# Patient Record
Sex: FEMALE | Race: WHITE | Employment: OTHER | URBAN - METROPOLITAN AREA
[De-identification: names, ages, dates, MRNs, and addresses within clinical notes are randomized per-mention and may not be internally consistent; named-entity substitution may affect disease eponyms.]

---

## 2017-10-30 ENCOUNTER — OFFICE VISIT (OUTPATIENT)
Dept: OBGYN | Age: 72
End: 2017-10-30

## 2017-10-30 VITALS
DIASTOLIC BLOOD PRESSURE: 70 MMHG | BODY MASS INDEX: 32.21 KG/M2 | HEIGHT: 63 IN | WEIGHT: 181.8 LBS | SYSTOLIC BLOOD PRESSURE: 114 MMHG

## 2017-10-30 DIAGNOSIS — Z11.51 SCREENING FOR HPV (HUMAN PAPILLOMAVIRUS): ICD-10-CM

## 2017-10-30 DIAGNOSIS — Z78.0 POSTMENOPAUSAL: ICD-10-CM

## 2017-10-30 DIAGNOSIS — Z12.31 SCREENING MAMMOGRAM, ENCOUNTER FOR: ICD-10-CM

## 2017-10-30 DIAGNOSIS — Z01.419 WELL WOMAN EXAM WITH ROUTINE GYNECOLOGICAL EXAM: Primary | ICD-10-CM

## 2017-10-30 PROCEDURE — G0101 CA SCREEN;PELVIC/BREAST EXAM: HCPCS | Performed by: OBSTETRICS & GYNECOLOGY

## 2017-10-30 RX ORDER — BIMATOPROST 0.01 %
1 DROPS OPHTHALMIC (EYE) NIGHTLY
COMMUNITY
Start: 2017-09-06

## 2017-10-30 RX ORDER — SULFAMETHOXAZOLE AND TRIMETHOPRIM 800; 160 MG/1; MG/1
1 TABLET ORAL 2 TIMES DAILY
Status: ON HOLD | COMMUNITY
End: 2019-05-07

## 2017-10-30 RX ORDER — CEFUROXIME AXETIL 250 MG/1
TABLET ORAL
Status: ON HOLD | COMMUNITY
Start: 2017-09-26 | End: 2019-05-07

## 2017-10-30 NOTE — PROGRESS NOTES
History and Physical  [facility] [address]  P: [phone] / F: [phone]  Carlos Garcia  10/30/2017              71 y.o. Chief Complaint   Patient presents with    Annual Exam     last pap 10-18-16,wnl no complaints. declined student     New Patient     previous pt of Dr. Sandi Boles       /70   Ht 5' 3\" (1.6 m)   Wt 181 lb 12.8 oz (82.5 kg)   Breastfeeding? No   BMI 32.20 kg/m²   Alllergies:  Phenergan [promethazine hcl]               Primary Care Physician: Chelsea Hair MD    HPI : Carlos Garcia is a 71 y.o. female [id number] The patient was seen and examined. She has no chief complaint today and is here for her annual exam.  Her bowels are regular. There are no voiding complaints. She denies any bloating. She denies vaginal discharge and was counseled on STD's and the need for barrier contraception.        ________________________________________________________________________  Obstetric History       T4      L4     SAB0   TAB0   Ectopic0   Molar0   Multiple0   Live Births4       # Outcome Date GA Lbr Armando/2nd Weight Sex Delivery Anes PTL Lv   4 Term 80    F CS-Unspec   MATTHEW   3 Term 10/01/76    F CS-Unspec   MATTHEW   2 Term 76    M CS-Unspec   MATTHEW   1 Term 73    F CS-Unspec   MATTHEW        Past Medical History:   Diagnosis Date    Glaucoma     Meningitis                                                                    Past Surgical History:   Procedure Laterality Date    CHOLECYSTECTOMY      TONSILLECTOMY AND ADENOIDECTOMY       Family History   Problem Relation Age of Onset   [facility] COPD Mother     Coronary Art Dis Mother     COPD Father     Dementia Father     Other Father      series of pneumonia     Heart Defect Maternal Grandmother      enlarged heart     Hearing Loss Maternal Grandmother     Hearing Loss Maternal Grandfather     Heart Attack Paternal Grandmother  Coronary Art Dis Paternal Grandmother     Colon Cancer Paternal Grandfather      Social History     Social History    Marital status:      Spouse name: N/A    Number of children: N/A    Years of education: N/A     Occupational History    Not on file. Social History Main Topics    Smoking status: Never Smoker    Smokeless tobacco: Never Used    Alcohol use Yes      Comment: occ    Drug use: No    Sexual activity: Yes     Partners: Male     Other Topics Concern    Not on file     Social History Narrative    No narrative on file         MEDICATIONS:  Current Outpatient Prescriptions on File Prior to Visit   Medication Sig Dispense Refill    valACYclovir (VALTREX) 1 G tablet Take 1,000 mg by mouth 2 times daily. No current facility-administered medications on file prior to visit. ALLERGIES:  Allergies as of 10/30/2017 - Review Complete 10/30/2017   Allergen Reaction Noted    Phenergan [promethazine hcl]  12/06/2013           Gynecologic History:     No LMP recorded. Patient is postmenopausal.      ________________________________________________________________________  REVIEW OF SYSTEMS:       A minimum of an eleven point review of systems was completed. Review Of Systems (11 point):  Constitutional: No fever, chills or malaise;  No weight change or fatigue  Head and Eyes: No vision, Headache, Dizziness or trauma in last 12 months  ENT ROS: No hearing, Tinnitis, sinus or taste problems  Hematological and Lymphatic ROS: No Lymphoma, Von Willebrand's, Hemophillia or Bleeding History  Psych ROS: No Depression, Homicidal thoughts,suicidal thoughts, or anxiety  Breast ROS: No prior breast abnormalities or lumps  Respiratory ROS: No SOB, Pneumoniae,Cough, or Pulmonary Embolism History  Cardiovascular ROS: No Chest Pain with Exertion, Palpitations, Syncope, Edema, Arrhythmia  Gastrointestinal ROS: No Indigestion, Heartburn, Nausea, Vomiting, Diarrhea, Constipation,or Bowel Changes; No Bloody Stools or melena  Genito-Urinary ROS: No Dysuria, Hematuria or Nocturia. No Urinary Incontinence or Vaginal Discharge  Musculoskeletal ROS: No Arthralgia, Gout,Osteoporosis or Rheumatism  Neurological ROS: No CVA, Migraines, Epilepsy, Seizure Hx, or Limb Weakness  Dermatological ROS: No Rash, Itching, Hives, Mole Changes or Cancer                                                                                                                                                 PHYSICAL Exam:    Constitutional:  Vitals:    10/30/17 1306   BP: 114/70   Weight: 181 lb 12.8 oz (82.5 kg)   Height: 5' 3\" (1.6 m)       General Appearance: This  is a well Developed, well Nourished, well groomed female. Her BMI was reviewed. Nutritional decision making was discussed. Skin:  There was a Normal Inspection of the skin without rashes or lesions. There were no rashes. (Papular, Maculopapular, Hives, Pustular, Macular)     There were no lesions (Ulcers, Erythema, Abn. Appearing Nevi)      Lymphatic:  No Lymph Nodes were Palpable in the neck, axilla or groin. Neck and EENT:  The neck was supple. There were no masses   The thyroid was not enlarged and had no masses. Perrla, EOMI B/L, TMI B/L No Abnormalities. Throat inspected-No exudates or Masses, Nares Patent No Masses    Respiratory: The lungs were auscultated and found to be clear. There were no rales, rhonchi or wheezes. There was a good respiratory effort. Cardiovascular: The heart was in a regular rate and rhythm. No S3 or S4. There was no murmur appreciated. Location, grade, and radiation are not applicable. Extremities: The patients extremities were without calf tenderness, edema, or varicosities. There was full range of motion in all four extremities. Pulses in all four extremities were appreciated and are 2/4. Abdomen: The abdomen was soft and non-tender.  There were good bowel sounds in all quadrants and there was no guarding, rebound or rigidity. On evaluation there was no evidence of hepatosplenomegaly and there was no costal vertebral stephon tenderness bilaterally. No hernias were appreciated. Abdominal Scars:     Psych: The patient had a normal Orientation to: Time, Place, Person, and Situation  There is no Mood / Affect changes    Breast:  (Chest)  normal appearance, no masses or tenderness  Self breast exams were reviewed in detail. Literature was given. Pelvic Exam:  Vulva and vagina appear normal. Bimanual exam reveals normal uterus and adnexa. Rectal Exam:  Normal    Musculosk:  Normal Gait and station was noted. Digits were evaluated without abnormal findings. Range of motion, stability and strength were evaluated and found to be appropriate for the patients age. ASSESSMENT:      71 y.o. Annual  1. Well woman exam with routine gynecological exam  PAP SMEAR   2. Screening for HPV (human papillomavirus)  PAP SMEAR   3. Screening mammogram, encounter for  [facility] DIGITAL SCREEN W CAD BILATERAL   4. Postmenopausal  DEXA Bone Density Axial Skeleton                  Hereditary Breast, Ovarian, Colon and Uterine Cancer screening Done. Tobacco & Secondary smoke risks reviewed; instructed on cessation and avoidance      Counseling Completed:          PLAN:  Return in about 1 year (around 10/30/2018) for annual.  Repeat Annual every 1 year  Cervical Cytology Evaluation begins at 24years old. If Negative Cytology, Follow-up screening per current guidelines. Mammograms every 1 year. If 35 yo and last mammogram was negative. Calcium and Vitamin D dosing reviewed. Colonoscopy screening reviewed as well as onset for bone density testing. Birth control and barrier recommendations discussed. STD counseling and prevention reviewed. Gardisil counseling completed for all patients 7-33 yo. Routine health maintenance per patients PCP.     Orders Placed This Encounter   Procedures    [facility] DIGITAL SCREEN W

## 2017-11-06 ENCOUNTER — HOSPITAL ENCOUNTER (OUTPATIENT)
Dept: WOMENS IMAGING | Age: 72
Discharge: HOME | End: 2017-11-06
Payer: MEDICARE

## 2017-11-06 DIAGNOSIS — Z78.0 POSTMENOPAUSAL: ICD-10-CM

## 2017-11-06 DIAGNOSIS — Z12.31 SCREENING MAMMOGRAM, ENCOUNTER FOR: ICD-10-CM

## 2017-11-06 PROCEDURE — G0202 SCR MAMMO BI INCL CAD: HCPCS

## 2017-11-06 PROCEDURE — 77080 DXA BONE DENSITY AXIAL: CPT

## 2017-11-07 DIAGNOSIS — Z01.419 WELL WOMAN EXAM WITH ROUTINE GYNECOLOGICAL EXAM: ICD-10-CM

## 2017-11-07 DIAGNOSIS — Z11.51 SCREENING FOR HPV (HUMAN PAPILLOMAVIRUS): ICD-10-CM

## 2017-11-14 ENCOUNTER — TELEPHONE (OUTPATIENT)
Dept: OBGYN | Age: 72
End: 2017-11-14

## 2018-11-13 ENCOUNTER — OFFICE VISIT (OUTPATIENT)
Dept: OBGYN CLINIC | Age: 73
End: 2018-11-13
Payer: MEDICARE

## 2018-11-13 VITALS — WEIGHT: 196 LBS | DIASTOLIC BLOOD PRESSURE: 70 MMHG | BODY MASS INDEX: 34.72 KG/M2 | SYSTOLIC BLOOD PRESSURE: 126 MMHG

## 2018-11-13 DIAGNOSIS — Z01.419 WELL WOMAN EXAM: Primary | ICD-10-CM

## 2018-11-13 DIAGNOSIS — Z12.31 SCREENING MAMMOGRAM, ENCOUNTER FOR: ICD-10-CM

## 2018-11-13 PROCEDURE — G8484 FLU IMMUNIZE NO ADMIN: HCPCS | Performed by: OBSTETRICS & GYNECOLOGY

## 2018-11-13 PROCEDURE — G0101 CA SCREEN;PELVIC/BREAST EXAM: HCPCS | Performed by: OBSTETRICS & GYNECOLOGY

## 2018-11-13 ASSESSMENT — PATIENT HEALTH QUESTIONNAIRE - PHQ9
SUM OF ALL RESPONSES TO PHQ QUESTIONS 1-9: 0
SUM OF ALL RESPONSES TO PHQ QUESTIONS 1-9: 0
SUM OF ALL RESPONSES TO PHQ9 QUESTIONS 1 & 2: 0
2. FEELING DOWN, DEPRESSED OR HOPELESS: 0
1. LITTLE INTEREST OR PLEASURE IN DOING THINGS: 0

## 2018-11-13 ASSESSMENT — ENCOUNTER SYMPTOMS
VOMITING: 0
BLOOD IN STOOL: 0
ALLERGIC/IMMUNOLOGIC NEGATIVE: 1
NAUSEA: 0
DIARRHEA: 0
ABDOMINAL DISTENTION: 0
ABDOMINAL PAIN: 0
CONSTIPATION: 0
EYES NEGATIVE: 1
RECTAL PAIN: 0
ANAL BLEEDING: 0
RESPIRATORY NEGATIVE: 1

## 2018-11-19 ENCOUNTER — HOSPITAL ENCOUNTER (OUTPATIENT)
Dept: WOMENS IMAGING | Age: 73
Discharge: HOME | End: 2018-11-21
Payer: MEDICARE

## 2018-11-19 DIAGNOSIS — Z12.31 SCREENING MAMMOGRAM, ENCOUNTER FOR: ICD-10-CM

## 2018-11-19 PROCEDURE — 77067 SCR MAMMO BI INCL CAD: CPT

## 2019-03-12 ENCOUNTER — HOSPITAL ENCOUNTER (OUTPATIENT)
Dept: MRI IMAGING | Age: 74
Discharge: HOME | End: 2019-03-14
Payer: MEDICARE

## 2019-03-12 DIAGNOSIS — M17.11 OSTEOARTHRITIS OF RIGHT KNEE, UNSPECIFIED OSTEOARTHRITIS TYPE: ICD-10-CM

## 2019-03-12 PROCEDURE — 73721 MRI JNT OF LWR EXTRE W/O DYE: CPT

## 2019-04-25 ENCOUNTER — HOSPITAL ENCOUNTER (OUTPATIENT)
Dept: PREADMISSION TESTING | Age: 74
Discharge: HOME | End: 2019-04-29
Payer: MEDICARE

## 2019-04-25 VITALS
HEIGHT: 63 IN | BODY MASS INDEX: 34.55 KG/M2 | RESPIRATION RATE: 16 BRPM | DIASTOLIC BLOOD PRESSURE: 80 MMHG | TEMPERATURE: 97.6 F | HEART RATE: 73 BPM | OXYGEN SATURATION: 98 % | SYSTOLIC BLOOD PRESSURE: 165 MMHG | WEIGHT: 195 LBS

## 2019-04-25 DIAGNOSIS — L71.8: Chronic | ICD-10-CM

## 2019-04-25 DIAGNOSIS — L71.9 ROSACEA, ACNE: Chronic | ICD-10-CM

## 2019-04-25 DIAGNOSIS — H10.823: Chronic | ICD-10-CM

## 2019-04-25 PROBLEM — N39.0 UTI (URINARY TRACT INFECTION): Chronic | Status: ACTIVE | Noted: 2019-04-25

## 2019-04-25 PROBLEM — H40.9 GLAUCOMA OF BOTH EYES: Chronic | Status: ACTIVE | Noted: 2019-04-25

## 2019-04-25 PROBLEM — M17.11 RIGHT KNEE DJD: Status: ACTIVE | Noted: 2019-04-25

## 2019-04-25 LAB
ABO/RH: NORMAL
ALBUMIN SERPL-MCNC: 4.3 G/DL (ref 3.5–4.6)
ALP BLD-CCNC: 106 U/L (ref 40–130)
ALT SERPL-CCNC: 14 U/L (ref 0–33)
ANION GAP SERPL CALCULATED.3IONS-SCNC: 14 MEQ/L (ref 9–15)
ANTIBODY SCREEN: NORMAL
APTT: 24.9 SEC (ref 21.6–35.4)
AST SERPL-CCNC: 10 U/L (ref 0–35)
BACTERIA: NEGATIVE /HPF
BASOPHILS ABSOLUTE: 0 K/UL (ref 0–0.2)
BASOPHILS RELATIVE PERCENT: 0.6 %
BILIRUB SERPL-MCNC: 0.4 MG/DL (ref 0.2–0.7)
BILIRUBIN URINE: NEGATIVE
BLOOD, URINE: NEGATIVE
BUN BLDV-MCNC: 17 MG/DL (ref 8–23)
CALCIUM SERPL-MCNC: 9 MG/DL (ref 8.5–9.9)
CHLORIDE BLD-SCNC: 97 MEQ/L (ref 95–107)
CLARITY: CLEAR
CO2: 22 MEQ/L (ref 20–31)
COLOR: YELLOW
CREAT SERPL-MCNC: 0.69 MG/DL (ref 0.5–0.9)
EOSINOPHILS ABSOLUTE: 0.1 K/UL (ref 0–0.7)
EOSINOPHILS RELATIVE PERCENT: 2.1 %
EPITHELIAL CELLS, UA: ABNORMAL /HPF (ref 0–5)
GFR AFRICAN AMERICAN: >60
GFR NON-AFRICAN AMERICAN: >60
GLOBULIN: 2.5 G/DL (ref 2.3–3.5)
GLUCOSE BLD-MCNC: 95 MG/DL (ref 70–99)
GLUCOSE URINE: NEGATIVE MG/DL
HCT VFR BLD CALC: 41 % (ref 37–47)
HEMOGLOBIN: 13.9 G/DL (ref 12–16)
HYALINE CASTS: ABNORMAL /HPF (ref 0–5)
INR BLD: 1
KETONES, URINE: NEGATIVE MG/DL
LEUKOCYTE ESTERASE, URINE: ABNORMAL
LYMPHOCYTES ABSOLUTE: 2.3 K/UL (ref 1–4.8)
LYMPHOCYTES RELATIVE PERCENT: 38.7 %
MCH RBC QN AUTO: 31.7 PG (ref 27–31.3)
MCHC RBC AUTO-ENTMCNC: 33.9 % (ref 33–37)
MCV RBC AUTO: 93.5 FL (ref 82–100)
MONOCYTES ABSOLUTE: 0.7 K/UL (ref 0.2–0.8)
MONOCYTES RELATIVE PERCENT: 11 %
NEUTROPHILS ABSOLUTE: 2.9 K/UL (ref 1.4–6.5)
NEUTROPHILS RELATIVE PERCENT: 47.6 %
NITRITE, URINE: NEGATIVE
PDW BLD-RTO: 13.5 % (ref 11.5–14.5)
PH UA: 6 (ref 5–9)
PLATELET # BLD: 253 K/UL (ref 130–400)
POTASSIUM SERPL-SCNC: 3.8 MEQ/L (ref 3.4–4.9)
PROTEIN UA: NEGATIVE MG/DL
PROTHROMBIN TIME: 9.8 SEC (ref 9–11.5)
RBC # BLD: 4.39 M/UL (ref 4.2–5.4)
RBC UA: ABNORMAL /HPF (ref 0–5)
SODIUM BLD-SCNC: 133 MEQ/L (ref 135–144)
SPECIFIC GRAVITY UA: 1.02 (ref 1–1.03)
TOTAL PROTEIN: 6.8 G/DL (ref 6.3–8)
URINE REFLEX TO CULTURE: YES
UROBILINOGEN, URINE: 0.2 E.U./DL
WBC # BLD: 6 K/UL (ref 4.8–10.8)
WBC UA: ABNORMAL /HPF (ref 0–5)

## 2019-04-25 PROCEDURE — 81001 URINALYSIS AUTO W/SCOPE: CPT

## 2019-04-25 PROCEDURE — 85610 PROTHROMBIN TIME: CPT

## 2019-04-25 PROCEDURE — 87077 CULTURE AEROBIC IDENTIFY: CPT

## 2019-04-25 PROCEDURE — 86850 RBC ANTIBODY SCREEN: CPT

## 2019-04-25 PROCEDURE — 80053 COMPREHEN METABOLIC PANEL: CPT

## 2019-04-25 PROCEDURE — 86901 BLOOD TYPING SEROLOGIC RH(D): CPT

## 2019-04-25 PROCEDURE — 86900 BLOOD TYPING SEROLOGIC ABO: CPT

## 2019-04-25 PROCEDURE — 87186 SC STD MICRODIL/AGAR DIL: CPT

## 2019-04-25 PROCEDURE — 85730 THROMBOPLASTIN TIME PARTIAL: CPT

## 2019-04-25 PROCEDURE — 85025 COMPLETE CBC W/AUTO DIFF WBC: CPT

## 2019-04-25 PROCEDURE — 87086 URINE CULTURE/COLONY COUNT: CPT

## 2019-04-25 RX ORDER — ASCORBIC ACID 500 MG
1000 TABLET ORAL DAILY
COMMUNITY

## 2019-04-25 RX ORDER — CHOLECALCIFEROL (VITAMIN D3) 1250 MCG
CAPSULE ORAL WEEKLY
COMMUNITY
End: 2019-11-14 | Stop reason: CLARIF

## 2019-04-25 RX ORDER — SODIUM CHLORIDE 0.9 % (FLUSH) 0.9 %
10 SYRINGE (ML) INJECTION PRN
Status: CANCELLED | OUTPATIENT
Start: 2019-05-07

## 2019-04-25 RX ORDER — OYSTER SHELL CALCIUM WITH VITAMIN D 500; 200 MG/1; [IU]/1
1 TABLET, FILM COATED ORAL DAILY
COMMUNITY

## 2019-04-25 RX ORDER — SODIUM CHLORIDE 0.9 % (FLUSH) 0.9 %
10 SYRINGE (ML) INJECTION EVERY 12 HOURS SCHEDULED
Status: CANCELLED | OUTPATIENT
Start: 2019-05-07

## 2019-04-25 RX ORDER — SODIUM CHLORIDE, SODIUM LACTATE, POTASSIUM CHLORIDE, CALCIUM CHLORIDE 600; 310; 30; 20 MG/100ML; MG/100ML; MG/100ML; MG/100ML
INJECTION, SOLUTION INTRAVENOUS CONTINUOUS
Status: CANCELLED | OUTPATIENT
Start: 2019-05-07

## 2019-04-25 RX ORDER — AMOXICILLIN 500 MG
CAPSULE ORAL DAILY
COMMUNITY
End: 2019-11-14 | Stop reason: CLARIF

## 2019-04-25 RX ORDER — LIDOCAINE HYDROCHLORIDE 10 MG/ML
1 INJECTION, SOLUTION EPIDURAL; INFILTRATION; INTRACAUDAL; PERINEURAL
Status: CANCELLED | OUTPATIENT
Start: 2019-05-07 | End: 2019-05-07

## 2019-04-25 RX ORDER — PREDNISONE 10 MG/1
10 TABLET ORAL DAILY
Status: ON HOLD | COMMUNITY
End: 2019-05-07

## 2019-04-25 NOTE — PROGRESS NOTES
EKG done by PCP Dr. Dom Grimaldo & paper copy on chart. Patient with recent UTI & started on Bactrim 4/22/2019 / urine with reflex done at PAT appt / repeat urine with reflex to be done on day or OR.

## 2019-04-27 LAB
ORGANISM: ABNORMAL
URINE CULTURE, ROUTINE: ABNORMAL
URINE CULTURE, ROUTINE: ABNORMAL

## 2019-05-07 ENCOUNTER — APPOINTMENT (OUTPATIENT)
Dept: GENERAL RADIOLOGY | Age: 74
DRG: 470 | End: 2019-05-07
Attending: ORTHOPAEDIC SURGERY
Payer: MEDICARE

## 2019-05-07 ENCOUNTER — ANESTHESIA EVENT (OUTPATIENT)
Dept: OPERATING ROOM | Age: 74
DRG: 470 | End: 2019-05-07
Payer: MEDICARE

## 2019-05-07 ENCOUNTER — ANESTHESIA (OUTPATIENT)
Dept: OPERATING ROOM | Age: 74
DRG: 470 | End: 2019-05-07
Payer: MEDICARE

## 2019-05-07 ENCOUNTER — HOSPITAL ENCOUNTER (INPATIENT)
Age: 74
LOS: 2 days | Discharge: HOME HEALTH | DRG: 470 | End: 2019-05-09
Attending: ORTHOPAEDIC SURGERY | Admitting: ORTHOPAEDIC SURGERY
Payer: MEDICARE

## 2019-05-07 VITALS — SYSTOLIC BLOOD PRESSURE: 131 MMHG | DIASTOLIC BLOOD PRESSURE: 68 MMHG | OXYGEN SATURATION: 98 %

## 2019-05-07 DIAGNOSIS — Z96.651 H/O TOTAL KNEE REPLACEMENT, RIGHT: Primary | ICD-10-CM

## 2019-05-07 LAB
BACTERIA: NEGATIVE /HPF
BILIRUBIN URINE: NEGATIVE
BLOOD, URINE: NEGATIVE
CLARITY: CLEAR
COLOR: YELLOW
EPITHELIAL CELLS, UA: ABNORMAL /HPF (ref 0–5)
GLUCOSE URINE: NEGATIVE MG/DL
HYALINE CASTS: ABNORMAL /HPF (ref 0–5)
KETONES, URINE: NEGATIVE MG/DL
LEUKOCYTE ESTERASE, URINE: ABNORMAL
NITRITE, URINE: NEGATIVE
PH UA: 5 (ref 5–9)
PROTEIN UA: NEGATIVE MG/DL
RBC UA: ABNORMAL /HPF (ref 0–5)
SPECIFIC GRAVITY UA: 1.02 (ref 1–1.03)
UROBILINOGEN, URINE: 0.2 E.U./DL
WBC UA: ABNORMAL /HPF (ref 0–5)

## 2019-05-07 PROCEDURE — 2580000003 HC RX 258: Performed by: ORTHOPAEDIC SURGERY

## 2019-05-07 PROCEDURE — 3700000001 HC ADD 15 MINUTES (ANESTHESIA): Performed by: ORTHOPAEDIC SURGERY

## 2019-05-07 PROCEDURE — 6360000002 HC RX W HCPCS: Performed by: NURSE PRACTITIONER

## 2019-05-07 PROCEDURE — 6360000002 HC RX W HCPCS: Performed by: ANESTHESIOLOGY

## 2019-05-07 PROCEDURE — 6360000002 HC RX W HCPCS: Performed by: ORTHOPAEDIC SURGERY

## 2019-05-07 PROCEDURE — C1713 ANCHOR/SCREW BN/BN,TIS/BN: HCPCS | Performed by: ORTHOPAEDIC SURGERY

## 2019-05-07 PROCEDURE — 2709999900 HC NON-CHARGEABLE SUPPLY: Performed by: ORTHOPAEDIC SURGERY

## 2019-05-07 PROCEDURE — 2580000003 HC RX 258: Performed by: NURSE PRACTITIONER

## 2019-05-07 PROCEDURE — 0SRC0J9 REPLACEMENT OF RIGHT KNEE JOINT WITH SYNTHETIC SUBSTITUTE, CEMENTED, OPEN APPROACH: ICD-10-PCS | Performed by: ORTHOPAEDIC SURGERY

## 2019-05-07 PROCEDURE — 73560 X-RAY EXAM OF KNEE 1 OR 2: CPT

## 2019-05-07 PROCEDURE — 87077 CULTURE AEROBIC IDENTIFY: CPT

## 2019-05-07 PROCEDURE — 97166 OT EVAL MOD COMPLEX 45 MIN: CPT

## 2019-05-07 PROCEDURE — 7100000000 HC PACU RECOVERY - FIRST 15 MIN: Performed by: ORTHOPAEDIC SURGERY

## 2019-05-07 PROCEDURE — 97535 SELF CARE MNGMENT TRAINING: CPT

## 2019-05-07 PROCEDURE — 6370000000 HC RX 637 (ALT 250 FOR IP): Performed by: NURSE PRACTITIONER

## 2019-05-07 PROCEDURE — 6370000000 HC RX 637 (ALT 250 FOR IP): Performed by: ORTHOPAEDIC SURGERY

## 2019-05-07 PROCEDURE — 64447 NJX AA&/STRD FEMORAL NRV IMG: CPT | Performed by: ANESTHESIOLOGY

## 2019-05-07 PROCEDURE — 81001 URINALYSIS AUTO W/SCOPE: CPT

## 2019-05-07 PROCEDURE — 3600000014 HC SURGERY LEVEL 4 ADDTL 15MIN: Performed by: ORTHOPAEDIC SURGERY

## 2019-05-07 PROCEDURE — 2580000003 HC RX 258: Performed by: ANESTHESIOLOGY

## 2019-05-07 PROCEDURE — 87186 SC STD MICRODIL/AGAR DIL: CPT

## 2019-05-07 PROCEDURE — 2500000003 HC RX 250 WO HCPCS: Performed by: NURSE PRACTITIONER

## 2019-05-07 PROCEDURE — 3700000000 HC ANESTHESIA ATTENDED CARE: Performed by: ORTHOPAEDIC SURGERY

## 2019-05-07 PROCEDURE — 1210000000 HC MED SURG R&B

## 2019-05-07 PROCEDURE — 97162 PT EVAL MOD COMPLEX 30 MIN: CPT

## 2019-05-07 PROCEDURE — C1776 JOINT DEVICE (IMPLANTABLE): HCPCS | Performed by: ORTHOPAEDIC SURGERY

## 2019-05-07 PROCEDURE — 6360000002 HC RX W HCPCS: Performed by: NURSE ANESTHETIST, CERTIFIED REGISTERED

## 2019-05-07 PROCEDURE — 3600000004 HC SURGERY LEVEL 4 BASE: Performed by: ORTHOPAEDIC SURGERY

## 2019-05-07 PROCEDURE — 2500000003 HC RX 250 WO HCPCS: Performed by: NURSE ANESTHETIST, CERTIFIED REGISTERED

## 2019-05-07 PROCEDURE — 7100000001 HC PACU RECOVERY - ADDTL 15 MIN: Performed by: ORTHOPAEDIC SURGERY

## 2019-05-07 PROCEDURE — 87086 URINE CULTURE/COLONY COUNT: CPT

## 2019-05-07 PROCEDURE — 2720000010 HC SURG SUPPLY STERILE: Performed by: ORTHOPAEDIC SURGERY

## 2019-05-07 DEVICE — SYSTEM TOT KNEE CEM FEM TIB COMP STD TIB INSRT STD PAT: Type: IMPLANTABLE DEVICE | Site: KNEE | Status: FUNCTIONAL

## 2019-05-07 DEVICE — COMPONENT ARTC SURF PS 6-9 CD 12 MM RT TIB KNEE POLYETH: Type: IMPLANTABLE DEVICE | Site: KNEE | Status: FUNCTIONAL

## 2019-05-07 DEVICE — IMPL KNEE PERSONA POLY PATELLA 26MM: Type: IMPLANTABLE DEVICE | Site: KNEE | Status: FUNCTIONAL

## 2019-05-07 DEVICE — PSN TIB STM 5 DEG SZ D R: Type: IMPLANTABLE DEVICE | Site: KNEE | Status: FUNCTIONAL

## 2019-05-07 DEVICE — JIG SURG RT KNEE AREF PT SPEC DISP PERSONA: Type: IMPLANTABLE DEVICE | Site: KNEE | Status: FUNCTIONAL

## 2019-05-07 DEVICE — CEMENT BNE 40GM HI VISC RADPQ FOR REV SURG: Type: IMPLANTABLE DEVICE | Site: KNEE | Status: FUNCTIONAL

## 2019-05-07 DEVICE — COMPONENT FEM SZ 6 STD R KNEE CO CHROM CEM POST STBL COR: Type: IMPLANTABLE DEVICE | Site: KNEE | Status: FUNCTIONAL

## 2019-05-07 RX ORDER — LIDOCAINE HYDROCHLORIDE 20 MG/ML
INJECTION, SOLUTION INFILTRATION; PERINEURAL PRN
Status: DISCONTINUED | OUTPATIENT
Start: 2019-05-07 | End: 2019-05-07 | Stop reason: SDUPTHER

## 2019-05-07 RX ORDER — SODIUM CHLORIDE 0.9 % (FLUSH) 0.9 %
10 SYRINGE (ML) INJECTION PRN
Status: DISCONTINUED | OUTPATIENT
Start: 2019-05-07 | End: 2019-05-09 | Stop reason: HOSPADM

## 2019-05-07 RX ORDER — HYDROCODONE BITARTRATE AND ACETAMINOPHEN 5; 325 MG/1; MG/1
1 TABLET ORAL PRN
Status: DISCONTINUED | OUTPATIENT
Start: 2019-05-07 | End: 2019-05-07 | Stop reason: HOSPADM

## 2019-05-07 RX ORDER — SODIUM CHLORIDE 9 MG/ML
INJECTION, SOLUTION INTRAVENOUS CONTINUOUS
Status: DISCONTINUED | OUTPATIENT
Start: 2019-05-07 | End: 2019-05-09 | Stop reason: HOSPADM

## 2019-05-07 RX ORDER — KETOROLAC TROMETHAMINE 15 MG/ML
15 INJECTION, SOLUTION INTRAMUSCULAR; INTRAVENOUS EVERY 6 HOURS
Status: COMPLETED | OUTPATIENT
Start: 2019-05-07 | End: 2019-05-07

## 2019-05-07 RX ORDER — MORPHINE SULFATE 2 MG/ML
2 INJECTION, SOLUTION INTRAMUSCULAR; INTRAVENOUS
Status: DISCONTINUED | OUTPATIENT
Start: 2019-05-07 | End: 2019-05-09 | Stop reason: HOSPADM

## 2019-05-07 RX ORDER — SULFAMETHOXAZOLE AND TRIMETHOPRIM 800; 160 MG/1; MG/1
1 TABLET ORAL
Status: ON HOLD | COMMUNITY
End: 2019-05-09 | Stop reason: HOSPADM

## 2019-05-07 RX ORDER — SODIUM CHLORIDE, SODIUM LACTATE, POTASSIUM CHLORIDE, CALCIUM CHLORIDE 600; 310; 30; 20 MG/100ML; MG/100ML; MG/100ML; MG/100ML
INJECTION, SOLUTION INTRAVENOUS CONTINUOUS
Status: DISCONTINUED | OUTPATIENT
Start: 2019-05-07 | End: 2019-05-07

## 2019-05-07 RX ORDER — ROPIVACAINE HYDROCHLORIDE 5 MG/ML
INJECTION, SOLUTION EPIDURAL; INFILTRATION; PERINEURAL PRN
Status: DISCONTINUED | OUTPATIENT
Start: 2019-05-07 | End: 2019-05-07 | Stop reason: SDUPTHER

## 2019-05-07 RX ORDER — OXYCODONE HYDROCHLORIDE 5 MG/1
5 TABLET ORAL EVERY 4 HOURS PRN
Status: DISCONTINUED | OUTPATIENT
Start: 2019-05-07 | End: 2019-05-09 | Stop reason: HOSPADM

## 2019-05-07 RX ORDER — DIPHENHYDRAMINE HYDROCHLORIDE 50 MG/ML
12.5 INJECTION INTRAMUSCULAR; INTRAVENOUS
Status: DISCONTINUED | OUTPATIENT
Start: 2019-05-07 | End: 2019-05-07 | Stop reason: HOSPADM

## 2019-05-07 RX ORDER — DIAZEPAM 2 MG/1
2 TABLET ORAL EVERY 6 HOURS PRN
Status: DISCONTINUED | OUTPATIENT
Start: 2019-05-07 | End: 2019-05-08

## 2019-05-07 RX ORDER — OXYCODONE HCL 10 MG/1
10 TABLET, FILM COATED, EXTENDED RELEASE ORAL ONCE
Status: COMPLETED | OUTPATIENT
Start: 2019-05-07 | End: 2019-05-07

## 2019-05-07 RX ORDER — HYDROCODONE BITARTRATE AND ACETAMINOPHEN 5; 325 MG/1; MG/1
2 TABLET ORAL PRN
Status: DISCONTINUED | OUTPATIENT
Start: 2019-05-07 | End: 2019-05-07 | Stop reason: HOSPADM

## 2019-05-07 RX ORDER — SODIUM CHLORIDE 0.9 % (FLUSH) 0.9 %
10 SYRINGE (ML) INJECTION EVERY 12 HOURS SCHEDULED
Status: DISCONTINUED | OUTPATIENT
Start: 2019-05-07 | End: 2019-05-07 | Stop reason: HOSPADM

## 2019-05-07 RX ORDER — SODIUM CHLORIDE 0.9 % (FLUSH) 0.9 %
10 SYRINGE (ML) INJECTION EVERY 12 HOURS SCHEDULED
Status: DISCONTINUED | OUTPATIENT
Start: 2019-05-07 | End: 2019-05-09 | Stop reason: HOSPADM

## 2019-05-07 RX ORDER — PROPOFOL 10 MG/ML
INJECTION, EMULSION INTRAVENOUS CONTINUOUS PRN
Status: DISCONTINUED | OUTPATIENT
Start: 2019-05-07 | End: 2019-05-07 | Stop reason: SDUPTHER

## 2019-05-07 RX ORDER — ACETAMINOPHEN 325 MG/1
650 TABLET ORAL EVERY 4 HOURS PRN
Status: DISCONTINUED | OUTPATIENT
Start: 2019-05-07 | End: 2019-05-09 | Stop reason: HOSPADM

## 2019-05-07 RX ORDER — ASPIRIN 81 MG/1
81 TABLET ORAL 2 TIMES DAILY
Status: DISCONTINUED | OUTPATIENT
Start: 2019-05-08 | End: 2019-05-09 | Stop reason: HOSPADM

## 2019-05-07 RX ORDER — MIDAZOLAM HYDROCHLORIDE 1 MG/ML
INJECTION INTRAMUSCULAR; INTRAVENOUS PRN
Status: DISCONTINUED | OUTPATIENT
Start: 2019-05-07 | End: 2019-05-07 | Stop reason: SDUPTHER

## 2019-05-07 RX ORDER — LIDOCAINE HYDROCHLORIDE 10 MG/ML
1 INJECTION, SOLUTION EPIDURAL; INFILTRATION; INTRACAUDAL; PERINEURAL
Status: COMPLETED | OUTPATIENT
Start: 2019-05-07 | End: 2019-05-07

## 2019-05-07 RX ORDER — SENNA AND DOCUSATE SODIUM 50; 8.6 MG/1; MG/1
1 TABLET, FILM COATED ORAL 2 TIMES DAILY
Status: DISCONTINUED | OUTPATIENT
Start: 2019-05-07 | End: 2019-05-09 | Stop reason: HOSPADM

## 2019-05-07 RX ORDER — ONDANSETRON 2 MG/ML
4 INJECTION INTRAMUSCULAR; INTRAVENOUS
Status: DISCONTINUED | OUTPATIENT
Start: 2019-05-07 | End: 2019-05-07 | Stop reason: HOSPADM

## 2019-05-07 RX ORDER — CELECOXIB 200 MG/1
400 CAPSULE ORAL ONCE
Status: COMPLETED | OUTPATIENT
Start: 2019-05-07 | End: 2019-05-07

## 2019-05-07 RX ORDER — MORPHINE SULFATE 4 MG/ML
4 INJECTION, SOLUTION INTRAMUSCULAR; INTRAVENOUS
Status: DISCONTINUED | OUTPATIENT
Start: 2019-05-07 | End: 2019-05-09 | Stop reason: HOSPADM

## 2019-05-07 RX ORDER — METOCLOPRAMIDE HYDROCHLORIDE 5 MG/ML
10 INJECTION INTRAMUSCULAR; INTRAVENOUS
Status: DISCONTINUED | OUTPATIENT
Start: 2019-05-07 | End: 2019-05-07 | Stop reason: HOSPADM

## 2019-05-07 RX ORDER — FENTANYL CITRATE 50 UG/ML
50 INJECTION, SOLUTION INTRAMUSCULAR; INTRAVENOUS EVERY 10 MIN PRN
Status: DISCONTINUED | OUTPATIENT
Start: 2019-05-07 | End: 2019-05-07 | Stop reason: HOSPADM

## 2019-05-07 RX ORDER — FENTANYL CITRATE 50 UG/ML
INJECTION, SOLUTION INTRAMUSCULAR; INTRAVENOUS PRN
Status: DISCONTINUED | OUTPATIENT
Start: 2019-05-07 | End: 2019-05-07 | Stop reason: SDUPTHER

## 2019-05-07 RX ORDER — ACETAMINOPHEN 500 MG
1000 TABLET ORAL ONCE
Status: COMPLETED | OUTPATIENT
Start: 2019-05-07 | End: 2019-05-07

## 2019-05-07 RX ORDER — MAGNESIUM HYDROXIDE 1200 MG/15ML
LIQUID ORAL CONTINUOUS PRN
Status: COMPLETED | OUTPATIENT
Start: 2019-05-07 | End: 2019-05-07

## 2019-05-07 RX ORDER — SODIUM CHLORIDE 0.9 % (FLUSH) 0.9 %
10 SYRINGE (ML) INJECTION PRN
Status: DISCONTINUED | OUTPATIENT
Start: 2019-05-07 | End: 2019-05-07 | Stop reason: HOSPADM

## 2019-05-07 RX ORDER — ONDANSETRON 2 MG/ML
4 INJECTION INTRAMUSCULAR; INTRAVENOUS EVERY 6 HOURS PRN
Status: DISCONTINUED | OUTPATIENT
Start: 2019-05-07 | End: 2019-05-09 | Stop reason: HOSPADM

## 2019-05-07 RX ORDER — LIDOCAINE HYDROCHLORIDE 10 MG/ML
1 INJECTION, SOLUTION EPIDURAL; INFILTRATION; INTRACAUDAL; PERINEURAL
Status: DISCONTINUED | OUTPATIENT
Start: 2019-05-07 | End: 2019-05-07 | Stop reason: HOSPADM

## 2019-05-07 RX ORDER — MEPERIDINE HYDROCHLORIDE 25 MG/ML
12.5 INJECTION INTRAMUSCULAR; INTRAVENOUS; SUBCUTANEOUS EVERY 5 MIN PRN
Status: DISCONTINUED | OUTPATIENT
Start: 2019-05-07 | End: 2019-05-07 | Stop reason: HOSPADM

## 2019-05-07 RX ADMIN — KETOROLAC TROMETHAMINE 15 MG: 15 INJECTION, SOLUTION INTRAMUSCULAR; INTRAVENOUS at 14:17

## 2019-05-07 RX ADMIN — TRANEXAMIC ACID 1000 MG: 1 INJECTION, SOLUTION INTRAVENOUS at 10:37

## 2019-05-07 RX ADMIN — PHENYLEPHRINE HYDROCHLORIDE 100 MCG: 10 INJECTION INTRAVENOUS at 10:35

## 2019-05-07 RX ADMIN — SODIUM CHLORIDE: 9 INJECTION, SOLUTION INTRAVENOUS at 14:17

## 2019-05-07 RX ADMIN — ONDANSETRON 4 MG: 2 INJECTION INTRAMUSCULAR; INTRAVENOUS at 14:17

## 2019-05-07 RX ADMIN — PHENYLEPHRINE HYDROCHLORIDE 100 MCG: 10 INJECTION INTRAVENOUS at 11:27

## 2019-05-07 RX ADMIN — MIDAZOLAM HYDROCHLORIDE 2 MG: 1 INJECTION, SOLUTION INTRAMUSCULAR; INTRAVENOUS at 10:05

## 2019-05-07 RX ADMIN — PHENYLEPHRINE HYDROCHLORIDE 100 MCG: 10 INJECTION INTRAVENOUS at 11:29

## 2019-05-07 RX ADMIN — ONDANSETRON 4 MG: 2 INJECTION INTRAMUSCULAR; INTRAVENOUS at 20:28

## 2019-05-07 RX ADMIN — PHENYLEPHRINE HYDROCHLORIDE 100 MCG: 10 INJECTION INTRAVENOUS at 10:37

## 2019-05-07 RX ADMIN — SENNOSIDES,DOCUSATE SODIUM 1 TABLET: 50; 8.6 TABLET, FILM COATED ORAL at 21:23

## 2019-05-07 RX ADMIN — DIAZEPAM 2 MG: 2 TABLET ORAL at 18:14

## 2019-05-07 RX ADMIN — PHENYLEPHRINE HYDROCHLORIDE 100 MCG: 10 INJECTION INTRAVENOUS at 11:09

## 2019-05-07 RX ADMIN — ACETAMINOPHEN 1000 MG: 500 TABLET ORAL at 08:58

## 2019-05-07 RX ADMIN — CELECOXIB 400 MG: 200 CAPSULE ORAL at 08:58

## 2019-05-07 RX ADMIN — PHENYLEPHRINE HYDROCHLORIDE 100 MCG: 10 INJECTION INTRAVENOUS at 11:02

## 2019-05-07 RX ADMIN — KETOROLAC TROMETHAMINE 15 MG: 15 INJECTION, SOLUTION INTRAMUSCULAR; INTRAVENOUS at 20:29

## 2019-05-07 RX ADMIN — FENTANYL CITRATE 100 MCG: 50 INJECTION, SOLUTION INTRAMUSCULAR; INTRAVENOUS at 10:05

## 2019-05-07 RX ADMIN — PROPOFOL 100 MCG/KG/MIN: 10 INJECTION, EMULSION INTRAVENOUS at 10:32

## 2019-05-07 RX ADMIN — PHENYLEPHRINE HYDROCHLORIDE 100 MCG: 10 INJECTION INTRAVENOUS at 10:54

## 2019-05-07 RX ADMIN — PHENYLEPHRINE HYDROCHLORIDE 100 MCG: 10 INJECTION INTRAVENOUS at 10:52

## 2019-05-07 RX ADMIN — LIDOCAINE HYDROCHLORIDE 100 MG: 20 INJECTION, SOLUTION INFILTRATION; PERINEURAL at 10:32

## 2019-05-07 RX ADMIN — OXYCODONE HYDROCHLORIDE 5 MG: 5 TABLET ORAL at 19:49

## 2019-05-07 RX ADMIN — TRANEXAMIC ACID 1000 MG: 1 INJECTION, SOLUTION INTRAVENOUS at 12:37

## 2019-05-07 RX ADMIN — PHENYLEPHRINE HYDROCHLORIDE 100 MCG: 10 INJECTION INTRAVENOUS at 10:43

## 2019-05-07 RX ADMIN — SODIUM CHLORIDE, POTASSIUM CHLORIDE, SODIUM LACTATE AND CALCIUM CHLORIDE: 600; 310; 30; 20 INJECTION, SOLUTION INTRAVENOUS at 11:29

## 2019-05-07 RX ADMIN — FAMOTIDINE 20 MG: 10 INJECTION, SOLUTION INTRAVENOUS at 14:17

## 2019-05-07 RX ADMIN — OXYCODONE HYDROCHLORIDE 5 MG: 5 TABLET ORAL at 14:53

## 2019-05-07 RX ADMIN — Medication 2 G: at 10:18

## 2019-05-07 RX ADMIN — ROPIVACAINE HYDROCHLORIDE 30 ML: 5 INJECTION, SOLUTION EPIDURAL; INFILTRATION; PERINEURAL at 10:10

## 2019-05-07 RX ADMIN — PHENYLEPHRINE HYDROCHLORIDE 100 MCG: 10 INJECTION INTRAVENOUS at 10:44

## 2019-05-07 RX ADMIN — SODIUM CHLORIDE, POTASSIUM CHLORIDE, SODIUM LACTATE AND CALCIUM CHLORIDE: 600; 310; 30; 20 INJECTION, SOLUTION INTRAVENOUS at 08:58

## 2019-05-07 RX ADMIN — OXYCODONE HYDROCHLORIDE 5 MG: 5 TABLET ORAL at 23:38

## 2019-05-07 RX ADMIN — CEFAZOLIN SODIUM 2 G: 1 INJECTION, SOLUTION INTRAVENOUS at 18:14

## 2019-05-07 RX ADMIN — LIDOCAINE HYDROCHLORIDE 0.1 ML: 10 INJECTION, SOLUTION EPIDURAL; INFILTRATION; INTRACAUDAL; PERINEURAL at 08:57

## 2019-05-07 RX ADMIN — SODIUM CHLORIDE, POTASSIUM CHLORIDE, SODIUM LACTATE AND CALCIUM CHLORIDE: 600; 310; 30; 20 INJECTION, SOLUTION INTRAVENOUS at 09:58

## 2019-05-07 RX ADMIN — OXYCODONE HYDROCHLORIDE 10 MG: 10 TABLET, FILM COATED, EXTENDED RELEASE ORAL at 08:58

## 2019-05-07 RX ADMIN — Medication 10 ML: at 20:30

## 2019-05-07 ASSESSMENT — PULMONARY FUNCTION TESTS
PIF_VALUE: 1
PIF_VALUE: 0
PIF_VALUE: 1
PIF_VALUE: 0
PIF_VALUE: 1
PIF_VALUE: 0
PIF_VALUE: 1
PIF_VALUE: 0
PIF_VALUE: 1
PIF_VALUE: 1
PIF_VALUE: 0
PIF_VALUE: 1
PIF_VALUE: 1
PIF_VALUE: 0
PIF_VALUE: 1
PIF_VALUE: 0
PIF_VALUE: 1
PIF_VALUE: 0
PIF_VALUE: 1

## 2019-05-07 ASSESSMENT — PAIN SCALES - GENERAL
PAINLEVEL_OUTOF10: 0
PAINLEVEL_OUTOF10: 4
PAINLEVEL_OUTOF10: 3
PAINLEVEL_OUTOF10: 7
PAINLEVEL_OUTOF10: 5
PAINLEVEL_OUTOF10: 0
PAINLEVEL_OUTOF10: 0
PAINLEVEL_OUTOF10: 6
PAINLEVEL_OUTOF10: 2
PAINLEVEL_OUTOF10: 3

## 2019-05-07 ASSESSMENT — PAIN DESCRIPTION - LOCATION
LOCATION: KNEE
LOCATION: KNEE;LEG

## 2019-05-07 ASSESSMENT — PAIN DESCRIPTION - ORIENTATION
ORIENTATION: RIGHT
ORIENTATION: RIGHT

## 2019-05-07 ASSESSMENT — PAIN DESCRIPTION - PAIN TYPE
TYPE: SURGICAL PAIN
TYPE: SURGICAL PAIN

## 2019-05-07 ASSESSMENT — ENCOUNTER SYMPTOMS
APNEA: 0
EYE DISCHARGE: 0
CHEST TIGHTNESS: 0
ABDOMINAL DISTENTION: 0
EYE ITCHING: 0
ABDOMINAL PAIN: 0
BACK PAIN: 0

## 2019-05-07 NOTE — FLOWSHEET NOTE
Perfect served dr Grant Fuentes to inform home medication list has been verified, per dr Grant Fuentes, no medications need reordered at this time.

## 2019-05-07 NOTE — ANESTHESIA PROCEDURE NOTES
Spinal Block    Patient location during procedure: pre-op  Reason for block: primary anesthetic  Staffing  Anesthesiologist: Patrick King MD  Preanesthetic Checklist  Completed: patient identified, site marked, surgical consent, pre-op evaluation, timeout performed, IV checked, risks and benefits discussed, monitors and equipment checked, anesthesia consent given, oxygen available and patient being monitored  Spinal Block  Patient position: sitting  Prep: Betadine  Patient monitoring: cardiac monitor, continuous pulse ox and frequent blood pressure checks  Approach: midline  Location: L4/L5  Provider prep: mask and sterile gloves  Local infiltration: lidocaine  Agent: bupivacaine  Dose: 2  Dose: 2  Needle  Needle type: Pencan   Needle gauge: 24 G  Assessment  Events: cerebrospinal fluid  Swirl obtained: Yes  CSF: clear  Attempts: 1  Hemodynamics: stable

## 2019-05-07 NOTE — CARE COORDINATION
Met with patient regarding discharge planning. She states she lives with her spouse. She has a walker and a shower chair. She had prehab with [facility]. Her plan is to go home with [facility] at discharge. She denies any other needs. Electronically signed by Ting Guy RN on 5/7/19 at 3:48 PM    Medicare IMM reviewed with patient. She verbalized understanding and signature obtained.   Electronically signed by Ting Guy RN on 5/7/19 at 3:51 PM

## 2019-05-07 NOTE — CONSULTS
Consults     Ortho team consulted me for medical management for her medical conditions, pt denies any needs except for nausea and vomiting. S/p total right knee replacement tolerated the procedure well.     Past Medical History:   Diagnosis Date    Arthritis     both knees    Glaucoma 2017    Meningitis     PONV (postoperative nausea and vomiting)      Past Surgical History:   Procedure Laterality Date    CARPAL TUNNEL RELEASE Left      SECTION  1972    CHOLECYSTECTOMY      COLONOSCOPY      ENDOSCOPY, COLON, DIAGNOSTIC      EYE SURGERY      phaco with IOL OU    EYE SURGERY      Lasik OU    TONSILLECTOMY AND ADENOIDECTOMY      as child    WRIST GANGLION EXCISION Bilateral     right  / left      Social History     Tobacco History     Smoking Status  Never Smoker    Smokeless Tobacco Use  Never Used          Alcohol History     Alcohol Use Status  Yes Comment  occ          Drug Use     Drug Use Status  No          Sexual Activity     Sexually Active  Yes Partners  Male              Family History   Problem Relation Age of Onset    COPD Mother     Coronary Art Dis Mother     COPD Father     Dementia Father     Other Father         series of pneumonia     No Known Problems Sister     Heart Defect Maternal Grandmother         enlarged heart     Hearing Loss Maternal Grandmother     Hearing Loss Maternal Grandfather     Heart Attack Paternal Grandmother     Coronary Art Dis Paternal Grandmother     Colon Cancer Paternal Grandfather     No Known Problems Other     Other Daughter         migraines    Arthritis Daughter         s/p LTHR    No Known Problems Son     Breast Cancer Neg Hx     Cancer Neg Hx     Diabetes Neg Hx     Eclampsia Neg Hx     Hypertension Neg Hx     Ovarian Cancer Neg Hx      Labor Neg Hx     Spont Abortions Neg Hx     Stroke Neg Hx      Social History     Tobacco History     Smoking Status  Never Smoker    Smokeless deviation present. No thyromegaly present. Cardiovascular: Normal rate and regular rhythm. No murmur heard. Pulmonary/Chest: No stridor. No respiratory distress. Abdominal: She exhibits no distension. There is no tenderness. Musculoskeletal: She exhibits no edema. Neurological: She is alert and oriented to person, place, and time. No cranial nerve deficit. Coordination normal.   Skin: No erythema. No pallor.      Lab Results   Component Value Date    WBC 6.0 04/25/2019    HGB 13.9 04/25/2019    HCT 41.0 04/25/2019    MCV 93.5 04/25/2019     04/25/2019     Lab Results   Component Value Date     04/25/2019    K 3.8 04/25/2019    CL 97 04/25/2019    CO2 22 04/25/2019    BUN 17 04/25/2019    CREATININE 0.69 04/25/2019    GLUCOSE 95 04/25/2019    CALCIUM 9.0 04/25/2019      1) encounter for post surgical care  Pain is controlled  2) HTN hydralazine prn  Resume lopressor  3)DVT ppx

## 2019-05-07 NOTE — FLOWSHEET NOTE
Pt arrived to room 226 awake and alert x 4, denies pain, oriented to room and call system, call light in reach, bed in lowest position, will continue to monitor.

## 2019-05-07 NOTE — ANESTHESIA POSTPROCEDURE EVALUATION
Department of Anesthesiology  Postprocedure Note    Patient: Cristal Vázquez  MRN: [id number]  YOB: 1945  Date of evaluation: 5/7/2019  Time:  12:04 PM     Procedure Summary     Date:  05/07/19 Room / Location:  [address] OR    Anesthesia Start:  1018 Anesthesia Stop:      Procedure:  RIGHT TOTAL KNEE ARTHROPLASTY, SUPINE, MARY PSI (Right ) Diagnosis:  (RIGHT KNEE DEGENERATIVE JOINT DISEASE)    Surgeon:  Victor M Harmon MD Responsible Provider:  Jax Gil MD    Anesthesia Type:  spinal ASA Status:  3          Anesthesia Type: spinal    Cristela Phase I:      Cristela Phase II:      Last vitals: Reviewed and per EMR flowsheets.        Anesthesia Post Evaluation    Patient location during evaluation: PACU  Patient participation: complete - patient participated  Level of consciousness: awake and alert  Pain score: 0  Airway patency: patent  Nausea & Vomiting: no nausea and no vomiting  Complications: no  Cardiovascular status: hemodynamically stable  Respiratory status: spontaneous ventilation, room air and acceptable  Hydration status: stable

## 2019-05-07 NOTE — PROGRESS NOTES
Physical Therapy Med Surg Initial Assessment  Facility/Department: [facility]  Room: N226/N226-01       NAME: Dorothy Payne  : 1945 (26 y.o.)  MRN: [id number]  CODE STATUS: Full Code    Date of Service: 2019    Patient Diagnosis(es): H/O total knee replacement, right [Z96.651]   No chief complaint on file.     Patient Active Problem List    Diagnosis Date Noted    H/O total knee replacement, right 2019    UTI (urinary tract infection) 2019    Right knee DJD 2019    Glaucoma of both eyes 2019    Conjunctivitis of both eyes due to rosacea 2019    Rosacea, acne 2019    Aseptic meningitis 2013    Hypogammaglobulinemia ([address] 2013        Past Medical History:   Diagnosis Date    Arthritis     both knees    Glaucoma 2017    Meningitis     PONV (postoperative nausea and vomiting)      Past Surgical History:   Procedure Laterality Date    CARPAL TUNNEL RELEASE Left    [facility]    CHOLECYSTECTOMY      COLONOSCOPY      ENDOSCOPY, COLON, DIAGNOSTIC      EYE SURGERY      phaco with IOL OU    EYE SURGERY      Lasik OU    TONSILLECTOMY AND ADENOIDECTOMY      as child    WRIST GANGLION EXCISION Bilateral     right  / left        Chart Reviewed: Yes  Patient assessed for rehabilitation services?: Yes  Family / Caregiver Present: No(family steps out for eval)  General Comment  Comments: Pt awake in bed, agreeable to PT eval    Restrictions:  Restrictions/Precautions: Fall Risk, Weight Bearing  Lower Extremity Weight Bearing Restrictions  Right Lower Extremity Weight Bearing: Weight Bearing As Tolerated  Position Activity Restriction  Other position/activity restrictions: knee immobilizer until (+) SLR     SUBJECTIVE:    Pre Treatment Pain Screening  Pain at present: 4  Intervention List: Patient able to continue with treatment    Post Treatment Pain Screening:   Pain Screening  Patient Currently in Pain: Yes  Pain Assessment  Pain Level: 4    Prior Level of Function:  Social/Functional History  Lives With: Spouse  Type of Home: House  Home Layout: Multi-level, Bed/Bath upstairs(5 steps to main level with half bath, 8 steps up to bedroom with 1 HR)  Home Access: Stairs to enter without rails  Entrance Stairs - Number of Steps: 1  Bathroom Shower/Tub: Walk-in shower  Bathroom Equipment: Shower chair  Home Equipment: Rolling walker, Cane  ADL Assistance: Independent  Homemaking Assistance: Independent  Ambulation Assistance: Independent(no AD)  Transfer Assistance: Independent  Active : Yes  Occupation: Retired    OBJECTIVE:   Vision/Hearing:  Vision: Within Functional Limits  Hearing: Within functional limits    Cognition:  Overall Orientation Status: Within Functional Limits  Follows Commands: Within Functional Limits         ROM:  RLE AROM: WFL  RLE General AROM: knee NT  LLE AROM : WFL    Strength:  Strength RLE  Strength RLE: WFL  Strength LLE  Comment: observed >/= 3/5    Neuro:  Balance  Sitting - Static: Good  Sitting - Dynamic: Good;-  Standing - Static: Fair;-  Standing - Dynamic: Fair;-        Sensation  Overall Sensation Status: Impaired  Additional Comments: Residual numbness RLE    Bed mobility  Supine to Sit: Minimal assistance(Incidental assist with RLE)  Sit to Supine: Minimal assistance(Incidental assist with RLE)    Transfers  Sit to Stand: Contact guard assistance  Stand to sit: Contact guard assistance  Bed to Chair: Contact guard assistance  Comment: cues for safety and sequencing, compensatory strategies fo rknee immobilizer  Training from various surfaces including bed, chair, commode    Ambulation  Ambulation?: Yes  Ambulation 1  Surface: level tile  Device: Rolling Walker  Other Apparatus: Knee Immobilizer  Assistance: Contact guard assistance  Quality of Gait: step-to pattern, antalgic gait  Distance: 5 ft x2    Activity Tolerance  Activity Tolerance: pt was motivated and completed eval but did have episode of emesis after    Exercises  Quad Sets: x10  Gluteal Sets: x10(pt reports she cannot do too many glute sets because it caused sciatica in the past)  Ankle Pumps: x10     ASSESSMENT:   Body structures, Functions, Activity limitations: Decreased functional mobility ; Decreased strength;Decreased endurance;Decreased balance;Decreased ROM; Increased Pain  Decision Making: Medium Complexity  History: medium  Exam: medium  Clinical Presentation: medium    Prognosis: Good  Patient Education: PT POC, HEP, safety awareness    DISCHARGE RECOMMENDATIONS:  Discharge Recommendations: Continue to assess pending progress, Patient would benefit from continued therapy after discharge    Assessment: Pt demonstrates the above deficits and decline in functional mobility s/p R TKA placing them at increased risk for falls. Pt would benefit from physical therapy to address above deficits and allow for safe return home at highest level of function, decrease risk for falls, and improve QOL.     REQUIRES PT FOLLOW UP: Yes      PLAN OF CARE:  Plan  Times per week: 5-7  Times per day: Twice a day  Current Treatment Recommendations: Strengthening, Neuromuscular Re-education, Functional Mobility Training, Home Exercise Program, Equipment Evaluation, Education, & procurement, Transfer Training, ROM, Gait Training, Safety Education & Training, Modalities, Balance Training, Endurance Training, Stair training, Pain Management, Patient/Caregiver Education & Training  Safety Devices  Type of devices: Bed alarm in place, Call light within reach, Nurse notified    Goals:  Patient goals : agreeable to PT POC  Short term goals  Short term goal 1: indep with HEP  Long term goals  Long term goal 1: indep with bed mobility  Long term goal 2: pt to be supervision with transfers  Long term goal 3: pt to ambulate >50 ft supervision FWW  Long term goal 4: pt to navigate >8 steps with SBA    [facility] (6 CLICK) Andrew Hickman

## 2019-05-07 NOTE — ANESTHESIA PRE PROCEDURE
Department of Anesthesiology  Preprocedure Note       Name:  Ignacio Tao   Age:  69 y.o.  :  1945                                          MRN:  [id number]         Date:  2019      Surgeon: Margaret Barry):  Sarah Casillas MD    Procedure: RIGHT TOTAL KNEE ARTHROPLASTY, SUPINE, MARY PSI (Right )    Medications prior to admission:   Prior to Admission medications    Medication Sig Start Date End Date Taking? Authorizing Provider   metoprolol tartrate (LOPRESSOR) 25 MG tablet Take 12.5 mg by mouth daily   Yes Historical Provider, MD   aspirin 81 MG tablet Take 81 mg by mouth daily   Yes Historical Provider, MD   Omega-3 Fatty Acids (FISH OIL) 1200 MG CAPS Take by mouth daily   Yes Historical Provider, MD   calcium-vitamin D (OSCAL-500) 500-200 MG-UNIT per tablet Take 1 tablet by mouth daily   Yes Historical Provider, MD   vitamin C (ASCORBIC ACID) 500 MG tablet Take 1,000 mg by mouth daily   Yes Historical Provider, MD   Cholecalciferol (VITAMIN D3) 04375 units CAPS Take by mouth once a week   Yes Historical Provider, MD   LUMIGAN 0.01 % SOLN ophthalmic drops Place 1 drop into both eyes nightly  17  Yes Historical Provider, MD   sulfamethoxazole-trimethoprim (BACTRIM DS;SEPTRA DS) 800-160 MG per tablet Take 1 tablet by mouth 2 times daily   Yes Historical Provider, MD   AZO-CRANBERRY PO Take 2 tablets by mouth daily    Yes Historical Provider, MD   Probiotic Product (PROBIOTIC PO) Take by mouth   Yes Historical Provider, MD   valACYclovir (VALTREX) 1 G tablet Take 1,000 mg by mouth 2 times daily.    Yes Historical Provider, MD       Current medications:    Current Facility-Administered Medications   Medication Dose Route Frequency Provider Last Rate Last Dose    tranexamic acid (CYKLOKAPRON) 1,000 mg in sodium chloride 0.9 % 50 mL IVPB  1,000 mg Intravenous On Call to [address], APRN - CNP        tranexamic acid (CYKLOKAPRON) 1,000 mg in sodium chloride 0.9 % 100 mL IVPB  1,000 mg Intravenous On Call to [address] [facility] [address], APRN - CNP        lactated ringers infusion   Intravenous Continuous Anabel Snell APRN -  mL/hr at 05/07/19 0858      sodium chloride flush 0.9 % injection 10 mL  10 mL Intravenous 2 times per day Anabel Snell, APRN - CNP        sodium chloride flush 0.9 % injection 10 mL  10 mL Intravenous PRN Anabel Snell APRN - CNP        ceFAZolin (ANCEF) 2 g in dextrose 5 % 100 mL IVPB  2 g Intravenous Once Anabel Snell APRN - CNP        fentaNYL (SUBLIMAZE) injection 50 mcg  50 mcg Intravenous Q10 Min PRN Papo Montelongo MD        HYDROmorphone (DILAUDID) injection 0.5 mg  0.5 mg Intravenous Q10 Min PRN Papo Montelongo MD        HYDROcodone-acetaminophen [facility]) 5-325 MG per tablet 1 tablet  1 tablet Oral PRN Papo Montelongo MD        Or    HYDROcodone-acetaminophen [facility]) 5-325 MG per tablet 2 tablet  2 tablet Oral PRN Papo Montelongo MD        diphenhydrAMINE (BENADRYL) injection 12.5 mg  12.5 mg Intravenous Once PRN Papo Montelongo MD        ondansetron [facility]) injection 4 mg  4 mg Intravenous Once PRN Papo Montelongo MD        metoclopramide [facility]) injection 10 mg  10 mg Intravenous Once PRN Papo Montelongo MD        meperidine (DEMEROL) injection 12.5 mg  12.5 mg Intravenous Q5 Min PRN Papo Montelongo MD        lactated ringers infusion   Intravenous Continuous Papo Montelongo MD        sodium chloride flush 0.9 % injection 10 mL  10 mL Intravenous 2 times per day Papo Montelongo MD        sodium chloride flush 0.9 % injection 10 mL  10 mL Intravenous PRN Papo Montelongo MD        lidocaine PF 1 % injection 1 mL  1 mL Intradermal Once PRN Papo Montelongo MD           Allergies:     Allergies   Allergen Reactions    Phenergan [Promethazine Hcl]      Causes RLS       Problem List:    Patient Active Problem List   Diagnosis Code    Aseptic meningitis G03.0    Hypogammaglobulinemia (HCC) D80.1    UTI (urinary tract infection) N39.0    Right knee DJD M17.11    Glaucoma of both eyes H40.9    Conjunctivitis of both eyes due to rosacea L71.9, H10.823    Rosacea, acne L71.9       Past Medical History:        Diagnosis Date    Arthritis     both knees    Glaucoma 2017    Meningitis     PONV (postoperative nausea and vomiting)        Past Surgical History:        Procedure Laterality Date    CARPAL TUNNEL RELEASE Left 2000s [facility]    CHOLECYSTECTOMY  2011    COLONOSCOPY      ENDOSCOPY, COLON, DIAGNOSTIC      EYE SURGERY  2000s    phaco with IOL OU    EYE SURGERY      Lasik OU    TONSILLECTOMY AND ADENOIDECTOMY      as child    WRIST GANGLION EXCISION Bilateral     right 2005 / left 1970       Social History:    Social History     Tobacco Use    Smoking status: Never Smoker    Smokeless tobacco: Never Used   Substance Use Topics    Alcohol use: Yes     Comment: occ                                Counseling given: Not Answered      Vital Signs (Current):   Vitals:    05/07/19 0813   BP: 135/75   Pulse: 79   Resp: 16   Temp: 98.3 °F (36.8 °C)   TempSrc: Temporal   SpO2: 96%   Weight: 195 lb (88.5 kg)   Height: 5' 3\" (1.6 m)                                              BP Readings from Last 3 Encounters:   05/07/19 135/75   04/25/19 (!) 165/80   11/13/18 126/70       NPO Status:                                                                                 BMI:   Wt Readings from Last 3 Encounters:   05/07/19 195 lb (88.5 kg)   04/25/19 195 lb (88.5 kg)   11/13/18 196 lb (88.9 kg)     Body mass index is 34.54 kg/m².     CBC:   Lab Results   Component Value Date    WBC 6.0 04/25/2019    RBC 4.39 04/25/2019    HGB 13.9 04/25/2019    HCT 41.0 04/25/2019    MCV 93.5 04/25/2019    RDW 13.5 04/25/2019     04/25/2019       CMP:   Lab Results   Component Value Date     04/25/2019    K 3.8 04/25/2019 CL 97 04/25/2019    CO2 22 04/25/2019    BUN 17 04/25/2019    CREATININE 0.69 04/25/2019    GFRAA >60.0 04/25/2019    LABGLOM >60.0 04/25/2019    GLUCOSE 95 04/25/2019    PROT 6.8 04/25/2019    CALCIUM 9.0 04/25/2019    BILITOT 0.4 04/25/2019    ALKPHOS 106 04/25/2019    AST 10 04/25/2019    ALT 14 04/25/2019       POC Tests: No results for input(s): POCGLU, POCNA, POCK, POCCL, POCBUN, POCHEMO, POCHCT in the last 72 hours. Coags:   Lab Results   Component Value Date    PROTIME 9.8 04/25/2019    INR 1.0 04/25/2019    APTT 24.9 04/25/2019       HCG (If Applicable): No results found for: PREGTESTUR, PREGSERUM, HCG, HCGQUANT     ABGs: No results found for: PHART, PO2ART, DGQ2TDU, SRR3GBW, BEART, H1TOBJTR     Type & Screen (If Applicable):  No results found for: LABABO, [address]    Anesthesia Evaluation  Patient summary reviewed and Nursing notes reviewed   history of anesthetic complications: PONV. Airway: Mallampati: II  TM distance: >3 FB   Neck ROM: full  Mouth opening: > = 3 FB Dental: normal exam         Pulmonary:Negative Pulmonary ROS and normal exam                               Cardiovascular:    (+) hypertension:,       ECG reviewed               Beta Blocker:  Dose within 24 Hrs         Neuro/Psych:   Negative Neuro/Psych ROS              GI/Hepatic/Renal: Neg GI/Hepatic/Renal ROS  (+) morbid obesity          Endo/Other: Negative Endo/Other ROS             Pt had PAT visit. Abdominal:           Vascular: negative vascular ROS. Anesthesia Plan      spinal     ASA 3           MIPS: Prophylactic antiemetics administered. Anesthetic plan and risks discussed with patient. Plan discussed with CRNA.     Attending anesthesiologist reviewed and agrees with Pre Eval content              Magali Guerin MD   5/7/2019

## 2019-05-07 NOTE — PROGRESS NOTES
[facility] OCCUPATIONAL THERAPY EVALUATION - ACUTE     Date: 2019  Patient Name: Que Rivas        MRN: [id number]  Account: [de-identified]   : 1945  (69 y.o.)  Room: [place] [address]    Chart Review:  Diagnosis:  There were no encounter diagnoses. Past Medical History:   Diagnosis Date    Arthritis     both knees    Glaucoma 2017    Meningitis     PONV (postoperative nausea and vomiting)      Past Surgical History:   Procedure Laterality Date    CARPAL TUNNEL RELEASE Left s     [facility]    CHOLECYSTECTOMY      COLONOSCOPY      ENDOSCOPY, COLON, DIAGNOSTIC      EYE SURGERY  2000s    phaco with IOL OU    EYE SURGERY      Lasik OU    TONSILLECTOMY AND ADENOIDECTOMY      as child    WRIST GANGLION EXCISION Bilateral     right  / left        Restrictions  Restrictions/Precautions: Fall Risk, Weight Bearing  Lower Extremity Weight Bearing Restrictions  Right Lower Extremity Weight Bearing: Weight Bearing As Tolerated  Position Activity Restriction  Other position/activity restrictions: knee immobilizer until (+) SLR     Safety Devices: Safety Devices  Safety Devices in place: Yes  Type of devices:  All fall risk precautions in place    Subjective  Pre Treatment Pain Screening  Pain at present: 4  Scale Used: Numeric Score  Intervention List: Patient able to continue with treatment, Patient declined any intervention    Pain Reassessment:   Pain Assessment  Patient Currently in Pain: Yes       Orientation  Orientation  Overall Orientation Status: Within Functional Limits    Prior Level of Function:  Social/Functional History  Lives With: Spouse  Type of Home: House  Home Layout: Multi-level, Bed/Bath upstairs(5 steps to main level with half bath, 8 steps up to bedroom with 1 HR)  Home Access: Stairs to enter without rails  Entrance Stairs - Number of Steps: 1  Bathroom Shower/Tub: Walk-in shower  Bathroom Equipment: Shower chair  Home Equipment: Rolling for all mobility       Functional Mobility:  Functional Mobility  Functional - Mobility Device: Rolling Walker  Activity: Other  Assist Level: Contact guard assistance  Functional Mobility Comments: To [facility] and [facility] only  Transfers  Sit to stand: Contact guard assistance  Stand to sit: Contact guard assistance    Bed Mobility  Bed mobility  Supine to Sit: Minimal assistance(Incidental assist with RLE)  Sit to Supine: Minimal assistance(Incidental assist with RLE)    Seated and Standing Balance:  Balance  Sitting Balance: Modified independent   Standing Balance: Contact guard assistance    Functional Endurance:  Activity Tolerance  Activity Tolerance: Patient Tolerated treatment well     Pt. engaged in static standing to improve balance and strength for ADLs. Multiple sit-stand transfers to-from [facility] and [facility] to promote increased safety awareness and transfer technique. Pt. demo's G understanding and recall of techniques with each transfer. D/C Recommendations:    Equipment Recommendations:  Continue to assess    OT Follow Up:  OT D/C RECOMMENDATIONS  REQUIRES OT FOLLOW UP: Yes       Assessment/Discharge Disposition:  Assessment: Pt. is a 69year old woman from home with spouse who [facility] to [facility] with the above deficits which impact her ability to perform ADLs and IADLs. Pt. would benefit from skilled OT to maximize independence and ADL safety.   Performance deficits / Impairments: Decreased functional mobility , Decreased ADL status, Decreased endurance, Decreased balance, Decreased high-level IADLs  Prognosis: Good  Discharge Recommendations: Continue to assess pending progress  History: Pt's medical history is moderately complex  Exam: Pt. has 5 performance deficits  Assistance / Modification: Pt. requires min A throughout    Six Click Score   How much help for putting on and taking off regular lower body clothing?: A Lot  How much help for Bathing?: A Little  How much help for Toileting?: A Little  How much help for putting on and taking off regular upper body clothing?: None  How much help for taking care of personal grooming?: None  How much help for eating meals?: None  AM-PAC Inpatient Daily Activity Raw Score: 20  AM-PAC Inpatient ADL T-Scale Score : 42.03  ADL Inpatient CMS 0-100% Score: 38.32    Plan:  Plan  Times per week: 1-3x  Plan weeks: Length of acute stay  Current Treatment Recommendations: Balance Training, Functional Mobility Training, Endurance Training, Pain Management, Safety Education & Training, Equipment Evaluation, Education, & procurement, Patient/Caregiver Education & Training, Self-Care / ADL, Home Management Training    Goals:   Patient will:    - Improve functional endurance to tolerate/complete 60 mins of ADL's  - Be Mod I in UB ADLs   - Be Mod I in LB ADLs  - Be Mod I in ADL transfers without LOB  - Be Mod I in toileting tasks  - Access appropriate D/C site with as few architectural barriers as possible. - Sequence self-care tasks with no verbal cues for safety    Patient Goal: Patient goals : \"I want to get better and move around\"     Discussed and agreed upon: Yes Comments:     Therapy Time:   OT Individual Minutes  Time In: 1603  Time Out: 1630  Minutes: 27  8 minutes ADL    Electronically signed by:     DENISHA Dia  5/7/2019, 4:36 PM

## 2019-05-08 LAB
ANION GAP SERPL CALCULATED.3IONS-SCNC: 13 MEQ/L (ref 9–15)
BUN BLDV-MCNC: 12 MG/DL (ref 8–23)
CALCIUM SERPL-MCNC: 8.3 MG/DL (ref 8.5–9.9)
CHLORIDE BLD-SCNC: 103 MEQ/L (ref 95–107)
CO2: 24 MEQ/L (ref 20–31)
CREAT SERPL-MCNC: 0.56 MG/DL (ref 0.5–0.9)
GFR AFRICAN AMERICAN: >60
GFR NON-AFRICAN AMERICAN: >60
GLUCOSE BLD-MCNC: 120 MG/DL (ref 70–99)
HCT VFR BLD CALC: 34.8 % (ref 37–47)
HEMOGLOBIN: 11.9 G/DL (ref 12–16)
MCH RBC QN AUTO: 31.8 PG (ref 27–31.3)
MCHC RBC AUTO-ENTMCNC: 34.2 % (ref 33–37)
MCV RBC AUTO: 93 FL (ref 82–100)
PDW BLD-RTO: 12.8 % (ref 11.5–14.5)
PLATELET # BLD: 221 K/UL (ref 130–400)
POTASSIUM REFLEX MAGNESIUM: 4.7 MEQ/L (ref 3.4–4.9)
RBC # BLD: 3.75 M/UL (ref 4.2–5.4)
SODIUM BLD-SCNC: 140 MEQ/L (ref 135–144)
WBC # BLD: 6.8 K/UL (ref 4.8–10.8)

## 2019-05-08 PROCEDURE — 6360000002 HC RX W HCPCS: Performed by: ORTHOPAEDIC SURGERY

## 2019-05-08 PROCEDURE — 6370000000 HC RX 637 (ALT 250 FOR IP): Performed by: ORTHOPAEDIC SURGERY

## 2019-05-08 PROCEDURE — 80048 BASIC METABOLIC PNL TOTAL CA: CPT

## 2019-05-08 PROCEDURE — 85027 COMPLETE CBC AUTOMATED: CPT

## 2019-05-08 PROCEDURE — 2580000003 HC RX 258: Performed by: ORTHOPAEDIC SURGERY

## 2019-05-08 PROCEDURE — 97535 SELF CARE MNGMENT TRAINING: CPT

## 2019-05-08 PROCEDURE — 1210000000 HC MED SURG R&B

## 2019-05-08 PROCEDURE — 97116 GAIT TRAINING THERAPY: CPT

## 2019-05-08 PROCEDURE — 36415 COLL VENOUS BLD VENIPUNCTURE: CPT

## 2019-05-08 PROCEDURE — 6370000000 HC RX 637 (ALT 250 FOR IP): Performed by: NURSE PRACTITIONER

## 2019-05-08 RX ORDER — CALCIUM CARBONATE 500(1250)
1 TABLET ORAL DAILY
Status: DISCONTINUED | OUTPATIENT
Start: 2019-05-08 | End: 2019-05-09 | Stop reason: HOSPADM

## 2019-05-08 RX ORDER — SULFAMETHOXAZOLE AND TRIMETHOPRIM 800; 160 MG/1; MG/1
1 TABLET ORAL
Status: DISCONTINUED | OUTPATIENT
Start: 2019-05-08 | End: 2019-05-09

## 2019-05-08 RX ORDER — DIAZEPAM 5 MG/1
5 TABLET ORAL EVERY 6 HOURS PRN
Status: DISCONTINUED | OUTPATIENT
Start: 2019-05-08 | End: 2019-05-09 | Stop reason: HOSPADM

## 2019-05-08 RX ORDER — TRAMADOL HYDROCHLORIDE 50 MG/1
50 TABLET ORAL EVERY 6 HOURS PRN
Status: DISCONTINUED | OUTPATIENT
Start: 2019-05-08 | End: 2019-05-09 | Stop reason: HOSPADM

## 2019-05-08 RX ORDER — ACETAMINOPHEN 80 MG
TABLET,CHEWABLE ORAL ONCE
Status: DISCONTINUED | OUTPATIENT
Start: 2019-05-08 | End: 2019-05-09 | Stop reason: HOSPADM

## 2019-05-08 RX ADMIN — DIAZEPAM 2 MG: 2 TABLET ORAL at 02:01

## 2019-05-08 RX ADMIN — ASPIRIN 81 MG: 81 TABLET, COATED ORAL at 21:19

## 2019-05-08 RX ADMIN — DIAZEPAM 5 MG: 5 TABLET ORAL at 08:31

## 2019-05-08 RX ADMIN — CEFAZOLIN SODIUM 2 G: 1 INJECTION, SOLUTION INTRAVENOUS at 02:02

## 2019-05-08 RX ADMIN — OXYCODONE HYDROCHLORIDE 5 MG: 5 TABLET ORAL at 09:42

## 2019-05-08 RX ADMIN — OXYCODONE HYDROCHLORIDE 5 MG: 5 TABLET ORAL at 14:03

## 2019-05-08 RX ADMIN — MORPHINE SULFATE 4 MG: 4 INJECTION INTRAVENOUS at 10:39

## 2019-05-08 RX ADMIN — TRAMADOL HYDROCHLORIDE 50 MG: 50 TABLET, FILM COATED ORAL at 21:19

## 2019-05-08 RX ADMIN — SENNOSIDES,DOCUSATE SODIUM 1 TABLET: 50; 8.6 TABLET, FILM COATED ORAL at 08:31

## 2019-05-08 RX ADMIN — DIAZEPAM 5 MG: 5 TABLET ORAL at 21:19

## 2019-05-08 RX ADMIN — OXYCODONE HYDROCHLORIDE 5 MG: 5 TABLET ORAL at 22:32

## 2019-05-08 RX ADMIN — METOPROLOL TARTRATE 12.5 MG: 25 TABLET, FILM COATED ORAL at 18:15

## 2019-05-08 RX ADMIN — OXYCODONE HYDROCHLORIDE 5 MG: 5 TABLET ORAL at 18:15

## 2019-05-08 RX ADMIN — SULFAMETHOXAZOLE AND TRIMETHOPRIM 1 TABLET: 800; 160 TABLET ORAL at 08:30

## 2019-05-08 RX ADMIN — CALCIUM 500 MG: 500 TABLET ORAL at 08:30

## 2019-05-08 RX ADMIN — DIAZEPAM 5 MG: 5 TABLET ORAL at 15:12

## 2019-05-08 RX ADMIN — OXYCODONE HYDROCHLORIDE 5 MG: 5 TABLET ORAL at 05:32

## 2019-05-08 RX ADMIN — ONDANSETRON 4 MG: 2 INJECTION INTRAMUSCULAR; INTRAVENOUS at 14:03

## 2019-05-08 RX ADMIN — ONDANSETRON 4 MG: 2 INJECTION INTRAMUSCULAR; INTRAVENOUS at 20:14

## 2019-05-08 RX ADMIN — TRAMADOL HYDROCHLORIDE 50 MG: 50 TABLET, FILM COATED ORAL at 15:12

## 2019-05-08 RX ADMIN — SENNOSIDES,DOCUSATE SODIUM 1 TABLET: 50; 8.6 TABLET, FILM COATED ORAL at 21:19

## 2019-05-08 RX ADMIN — Medication 10 ML: at 21:20

## 2019-05-08 RX ADMIN — ASPIRIN 81 MG: 81 TABLET, COATED ORAL at 08:30

## 2019-05-08 ASSESSMENT — PAIN DESCRIPTION - LOCATION
LOCATION: KNEE

## 2019-05-08 ASSESSMENT — PAIN DESCRIPTION - DESCRIPTORS
DESCRIPTORS: ACHING;SORE;HEAVINESS
DESCRIPTORS: ACHING;BURNING;CONSTANT
DESCRIPTORS: ACHING;SORE

## 2019-05-08 ASSESSMENT — PAIN SCALES - GENERAL
PAINLEVEL_OUTOF10: 7
PAINLEVEL_OUTOF10: 8
PAINLEVEL_OUTOF10: 6
PAINLEVEL_OUTOF10: 5
PAINLEVEL_OUTOF10: 8
PAINLEVEL_OUTOF10: 8
PAINLEVEL_OUTOF10: 5
PAINLEVEL_OUTOF10: 5
PAINLEVEL_OUTOF10: 7
PAINLEVEL_OUTOF10: 8

## 2019-05-08 ASSESSMENT — ENCOUNTER SYMPTOMS
SHORTNESS OF BREATH: 0
NAUSEA: 0
VOMITING: 0
DIARRHEA: 0
COUGH: 0

## 2019-05-08 ASSESSMENT — PAIN DESCRIPTION - PAIN TYPE
TYPE: SURGICAL PAIN

## 2019-05-08 ASSESSMENT — PAIN DESCRIPTION - ORIENTATION
ORIENTATION: RIGHT

## 2019-05-08 NOTE — PROGRESS NOTES
Knee  Pain Orientation: Right  Pain Descriptors: Aching; Sore       OBJECTIVE:         Bed mobility  Rolling to Right: Stand by assistance  Supine to Sit: Stand by assistance  Sit to Supine: Stand by assistance  Comment: bed flat with use of hand rails; increased time and effort to complete; vc's for improved technique    Transfers  Sit to Stand: Stand by assistance  Stand to sit: Stand by assistance  Car Transfer: Contact guard assistance  Comment: vc's fore technique and hand placement; slight decrease in eccentric lowering vc's to correct; Incidental guiding of R LE into car. Ambulation 1  Surface: level tile  Device: Rolling Walker  Other Apparatus: Knee Immobilizer  Assistance: Stand by assistance  Quality of Gait: FF posture with downward gaze, step-to gait pattern improving to step through with cueing  Distance: 25'x2  Comments: increased pain and fatigue    Stairs  # Steps : 4  Stairs Height: 6\"  Rails: Right ascending  Assistance: Contact guard assistance  Comment: NR pattern, 2 hand on rail - slow paced; pt c/o increased pain and fear. ASSESSMENT:  Body structures, Functions, Activity limitations: Decreased functional mobility ; Decreased strength;Decreased endurance;Decreased balance;Decreased ROM; Increased Pain    Assessment: Good participation; increased ambulation and initiated stairs, pt was fearful.      Activity Tolerance  Activity Tolerance: Patient Tolerated treatment well;Patient limited by pain       Discharge Recommendations:  Continue to assess pending progress, Patient would benefit from continued therapy after discharge    Goals:  Short term goals  Short term goal 1: indep with HEP  Long term goals  Long term goal 1: indep with bed mobility  Long term goal 2: pt to be supervision with transfers  Long term goal 3: pt to ambulate >50 ft supervision FWW  Long term goal 4: pt to navigate >8 steps with SBA  Patient Goals   Patient goals : agreeable to PT POC    PLAN:   Plan  Times per week: 5-7  Times per day: Twice a day  Current Treatment Recommendations: Strengthening, Neuromuscular Re-education, Functional Mobility Training, Home Exercise Program, Equipment Evaluation, Education, & procurement, Transfer Training, ROM, Gait Training, Safety Education & Training, Modalities, Balance Training, Endurance Training, Stair training, Pain Management, Patient/Caregiver Education & Training  Plan Comment: Cont. POC  Safety Devices  Type of devices:  All fall risk precautions in place, Bed alarm in place, Call light within reach, Left in bed     [facility] (6 CLICK) BASIC MOBILITY  AM-PAC Inpatient Mobility Raw Score : 19      Therapy Time   Individual   Time In 0840   Time Out 0920   Minutes 40      gait - 25 mins  Bm/Trsf - 15 mins       Huy Valadez PTA, 05/08/19 at 9:28 AM

## 2019-05-08 NOTE — DISCHARGE INSTR - COC
Continuity of Care Form    Patient Name: Dorothy Payne   :  1945  MRN:  [id number]    Admit date:  2019  Discharge date:  19    Code Status Order: Full Code   Advance Directives:   Advance Care Flowsheet Documentation     Date/Time Healthcare Directive Type of Healthcare Directive Copy in [address] Agent's Name Healthcare Agent's Phone Number    19 [phone]  -- --  -- No copy in [facility] -- -- --    19 0900  Yes, patient has an advance directive for healthcare treatment --  No, copy requested from family -- -- --          Admitting Physician:  Mayela Romero MD  PCP: Anuja Bruce MD    Discharging Nurse: FRANSISCO DUMONT Unit/Room#: N226/N226-01  Discharging Unit Phone Number: [phone]    Emergency Contact:   Extended Emergency Contact Information  Primary Emergency Contact: Sanjuanita Dooley  Address: [address] [place] of [facility] Phone: [phone]  Work Phone: [phone]  Mobile Phone: [phone]  Relation: Spouse  Secondary Emergency Contact: Ciera Se[place] of [facility] Phone: [phone]  Work Phone: [phone]  Mobile Phone: [phone]  Relation: Child    Past Surgical History:  Past Surgical History:   Procedure Laterality Date    CARPAL TUNNEL RELEASE Left    [address]    COLONOSCOPY      ENDOSCOPY, COLON, DIAGNOSTIC      EYE SURGERY      phaco with IOL OU    EYE SURGERY      Lasik OU   [address]      as child    WRIST GANGLION EXCISION Bilateral     right  / left        Immunization History:   Immunization History   Administered Date(s) Administered    Influenza Vaccine, unspecified formulation 10/05/2017       Active Problems:  Patient Active Problem List   Diagnosis Code    Aseptic meningitis G03.0    Hypogammaglobulinemia ([address] D80.1    UTI (urinary tract infection) N39.0    Right knee DJD M17.11    Glaucoma of both eyes H40.9    Conjunctivitis of both eyes due to rosacea L71.9, H10.823    Rosacea, acne L71.9    H/O total knee replacement, right Z96.651       Isolation/Infection:   Isolation          No Isolation            Nurse Assessment:  Last Vital Signs: BP (!) 142/66   Pulse 85   Temp 98.1 °F (36.7 °C) (Oral)   Resp 18   Ht 5' 3\" (1.6 m)   Wt 195 lb (88.5 kg)   LMP 04/25/1999   SpO2 99%   BMI 34.54 kg/m²     Last documented pain score (0-10 scale): Pain Level: 7  Last Weight:   Wt Readings from Last 1 Encounters:   05/07/19 195 lb (88.5 kg)     Mental Status:  oriented, alert, coherent, logical, thought processes intact and able to concentrate and follow conversation    IV Access:  - None    Nursing Mobility/ADLs:  Walking   Assisted  Transfer  Assisted  Bathing  Assisted  Dressing  Assisted  Toileting  Assisted  Feeding  [facility] Delivery   whole    Wound Care Documentation and Therapy:        Elimination:  Continence:   · Bowel: Yes  · Bladder: Yes  Urinary Catheter: None   Colostomy/Ileostomy/Ileal Conduit: No       Date of Last BM: 5/7/19    Intake/Output Summary (Last 24 hours) at 5/8/2019 0729  Last data filed at 5/8/2019 0363  Gross per 24 hour   Intake 2810 ml   Output 1010 ml   Net 1800 ml     I/O last 3 completed shifts: In: 5 [P.O.:560; I.V.:2150; IV Piggyback:100]  Out: 1010 [Urine:1000; Blood:10]    Safety Concerns: At Risk for Falls    Impairments/Disabilities:      impaired mobility     Nutrition Therapy:  Current Nutrition Therapy:   - Oral Diet:  General    Routes of Feeding: Oral  Liquids: Thin Liquids  Daily Fluid Restriction: no  Last Modified Barium Swallow with Video (Video Swallowing Test): not done    Treatments at the Time of Hospital Discharge:   Respiratory Treatments: incentive spirometer  Oxygen Therapy:  is not on home oxygen therapy.   Ventilator:    - No ventilator support    Rehab Therapies: Physical Therapy and Occupational Therapy  Weight Bearing Status/Restrictions: No weight bearing restirctions  Other Medical Equipment (for information only, NOT a DME order):  walker and knee immobilizer  Other Treatments: TEDs, Ice    Patient's personal belongings (please select all that are sent with patient):  all personal belongings sent with patient     RN SIGNATURE:  Electronically signed by Td Denny RN on 5/9/19 at 1:27 PM    CASE MANAGEMENT/SOCIAL WORK SECTION    Inpatient Status Date: ***    Readmission Risk Assessment Score:  Readmission Risk              Risk of Unplanned Readmission:        8           Discharging to Facility/ Agency   · Name: [facility]  · Address: [address] [place] [address] & [address]  · Phone: [phone]  · Fax:     Dialysis Facility (if applicable)   · Name:  · Address:  · Dialysis Schedule:  · Phone:  · Fax:    / signature: {Esignature:[id number]}    PHYSICIAN SECTION    Prognosis: {Prognosis:3027900970}    Condition at Discharge: [address] [place] [address] Patient Condition:161158103}    Rehab Potential (if transferring to Rehab): {Prognosis:1834917654}    Recommended Labs or Other Treatments After Discharge: ***    Physician Certification: I certify the above information and transfer of Asiya Thakkar  is necessary for the continuing treatment of the diagnosis listed and that she requires {Admit to Appropriate Level of Care:86915} for {GREATER/LESS:065541690} 30 days.      Update Admission H&P: {CHP DME Changes in [facility]:702011999}    PHYSICIAN SIGNATURE:  Electronically signed by Caroline Coulter MD on 5/8/19 at 3:01 PM

## 2019-05-08 NOTE — PROGRESS NOTES
[facility] OCCUPATIONAL THERAPY ORTHOPEDIC TREATMENT NOTE     Date: 2019  Patient Name: Noam Fisher        MRN: [id number]  Account: [de-identified]   : 1945  (69 y.o.)  Room: [place] [address]    Chart Review:  Diagnosis:  There were no encounter diagnoses. Restrictions:    Restrictions/Precautions  Restrictions/Precautions: Fall Risk, Weight Bearing  Position Activity Restriction  Other position/activity restrictions: knee immobilizer until (+) SLR      Subjective:  Patient states: \" I did too much with the session before you, I am in a lot of pain. \"  Pain: 8/10  Start of tx:  Pre Treatment Pain Screening  Pain at present: 8  Scale Used: Numeric Score  Intervention List: Patient able to continue with treatment, Nurse called to administer meds  Comments / Details: Pt provided with ice pack to R knee post session. RN provided pt with pain meds prior to session. End of tx:  Pain Assessment  Patient Currently in Pain: Yes  Pain Assessment: 0-10  Pain Level: 8  Pain Descriptors: Aching, Burning, Constant    Objective:  ADL  ADL  Feeding: Independent  Grooming: Setup  UE Bathing: Setup  LE Bathing: Minimal assistance  UE Dressing: Setup  LE Dressing: Moderate assistance  Toileting: Contact guard assistance  Additional Comments: Pt increased pain to R knee limited in assist requring assist to don liam hose. Pt and spouse educated on liam hose donning techniques utilizing bag to don. Toilet Transfers  Toilet - Technique: Stand pivot  Equipment Used: Standard bedside commode  Toilet Transfer: Contact guard assistance  Toilet Transfers Comments: Increased time for all mobility   Pt moaning throughout session with increase in pain requiring more assist in LB dressing with donning liam hose to RLE with spouse in room with education provided to pt and spouse on bag technique to don liam hose.         LIAM Hose donned:  [x]  Yes  []  No    If no - why    Treatment consisted of:   [x] ADL Training  [] Strengthening   [x]

## 2019-05-08 NOTE — PROGRESS NOTES
Que Rivas is a 69 y.o. female patient.  Pt was seen and evaluated, nausea and vomiting resolved, feeling a lot better, no overnight events    Current Facility-Administered Medications   Medication Dose Route Frequency Provider Last Rate Last Dose    calcium elemental (OSCAL) tablet 500 mg  1 tablet Oral Daily Harlon Jane, APRN - CNP   500 mg at 05/08/19 0830    bimatoprost (LUMIGAN) 0.01 % ophthalmic drops 1 drop  1 drop Both Eyes Nightly Harlon Jane, APRN - CNP        metoprolol tartrate (LOPRESSOR) tablet 12.5 mg  12.5 mg Oral Dinner Harlon Jane, APRN - CNP        sulfamethoxazole-trimethoprim (BACTRIM DS;SEPTRA DS) 800-160 MG per tablet 1 tablet  1 tablet Oral Once per day on Mon Wed Fri Harlon Jane, APRN - CNP   1 tablet at 05/08/19 0830    pill splitter   Does not apply Once Sola Chadwick MD        diazepam (VALIUM) tablet 5 mg  5 mg Oral Q6H PRN Harlon Jane, APRN - CNP   5 mg at 05/08/19 0831    traMADol (ULTRAM) tablet 50 mg  50 mg Oral Q6H PRN Harlon Jane, APRN - CNP        0.9 % sodium chloride infusion   Intravenous Continuous Cherelle Jiménez MD 50 mL/hr at 05/07/19 1417      sodium chloride flush 0.9 % injection 10 mL  10 mL Intravenous 2 times per day Cherelle Jiménez MD   10 mL at 05/07/19 2030    sodium chloride flush 0.9 % injection 10 mL  10 mL Intravenous PRN Cherelle Jiménez MD        acetaminophen (TYLENOL) tablet 650 mg  650 mg Oral Q4H PRN Cherelle Jiménez MD        oxyCODONE (ROXICODONE) immediate release tablet 5 mg  5 mg Oral Q4H PRN Cherelle Jiménez MD   5 mg at 05/08/19 8712    morphine (PF) injection 2 mg  2 mg Intravenous Q2H PRN Cherelle Jiménez MD        Or    morphine injection 4 mg  4 mg Intravenous Q2H PRN Cherelle Jiménez MD   4 mg at 05/08/19 1039    sennosides-docusate sodium (SENOKOT-S) 8.6-50 MG tablet 1 tablet  1 tablet Oral BID Cherelle Jiménez MD   1 tablet at 05/08/19 0831    ondansetron (ZOFRAN) injection 4 mg  4 mg Intravenous Q6H PRN Brooklyn Mcdermott MD   4 mg at 05/07/19 2028    aspirin EC tablet 81 mg  81 mg Oral BID Brooklyn Mcdermott MD   81 mg at 05/08/19 0830     Allergies   Allergen Reactions    Phenergan [Promethazine Hcl]      Causes RLS     Active Problems:    H/O total knee replacement, right  Resolved Problems:    * No resolved hospital problems. *    Blood pressure (!) 142/66, pulse 85, temperature 98.1 °F (36.7 °C), temperature source Oral, resp. rate 18, height 5' 3\" (1.6 m), weight 195 lb (88.5 kg), last menstrual period 04/25/1999, SpO2 99 %, not currently breastfeeding. Subjective:  Symptoms:  No shortness of breath, malaise, cough, chest pain, weakness, headache, chest pressure, anorexia, diarrhea or anxiety. Diet:  No nausea or vomiting. Objective:  General Appearance:  Comfortable, well-appearing and in no acute distress. Vital signs: (most recent): Blood pressure (!) 142/66, pulse 85, temperature 98.1 °F (36.7 °C), temperature source Oral, resp. rate 18, height 5' 3\" (1.6 m), weight 195 lb (88.5 kg), last menstrual period 04/25/1999, SpO2 99 %, not currently breastfeeding. HEENT: Normal HEENT exam.    Lungs:  Normal effort and normal respiratory rate. Breath sounds clear to auscultation. Heart: Normal rate. Regular rhythm. S1 normal and S2 normal.    Abdomen: Abdomen is soft. Bowel sounds are normal.   There is no abdominal tenderness. Extremities: Normal range of motion. Pulses: Distal pulses are intact. Neurological: Patient is alert. Pupils:  Pupils are equal, round, and reactive to light. Skin:  Warm and dry.       Past Medical History:   Diagnosis Date    Arthritis     both knees    Glaucoma 2017    Meningitis     PONV (postoperative nausea and vomiting)        Assessment & Plan  1) encounter for post surgical care  Pain is stbale  2) HTN   Resume lopressors  3) DVT ppx  Denzel Goldstein MD  5/8/2019

## 2019-05-08 NOTE — PROGRESS NOTES
to PT POC    PLAN:   Plan  Times per week: 5-7  Times per day: Twice a day  Current Treatment Recommendations: Strengthening, Neuromuscular Re-education, Functional Mobility Training, Home Exercise Program, Equipment Evaluation, Education, & procurement, Transfer Training, ROM, Gait Training, Safety Education & Training, Modalities, Balance Training, Endurance Training, Stair training, Pain Management, Patient/Caregiver Education & Training  Plan Comment: Cont. POC  Safety Devices  Type of devices:  All fall risk precautions in place, Bed alarm in place, Call light within reach, Left in bed     AMPA (6 CLICK) BASIC MOBILITY  AM-PAC Inpatient Mobility Raw Score : 19      Therapy Time   Individual   Time In 1348   Time Out 1402   Minutes 14      gait - 10 mins  Therex 4 mins       Valery Rose PTA, 05/08/19 at 2:08 PM

## 2019-05-08 NOTE — PROGRESS NOTES
Progress Note   TKA    Subjective:     Post-Operative Day: 1 Status Post right Total Knee Arthroplasty  Systemic or Specific Complaints:No Complaints    Objective:     CURRENT VITALS:  BP (!) 142/66   Pulse 85   Temp 98.1 °F (36.7 °C) (Oral)   Resp 18   Ht 5' 3\" (1.6 m)   Wt 195 lb (88.5 kg)   LMP 04/25/1999   SpO2 99%   BMI 34.54 kg/m²     General: alert, appears stated age and cooperative   Wound: Wound clean and dry no evidence of infection. Motion: Patient is to start joint motion today   DVT Exam: No evidence of DVT seen on physical exam.       Knee swollen but thigh soft to palpation. Moving foot and ankle. Good distal pulses. Data Review  Recent Labs     05/08/19  0550   WBC 6.8   RBC 3.75*   HGB 11.9*   HCT 34.8*   MCV 93.0   MCH 31.8*   MCHC 34.2   RDW 12.8            Assessment:     Status Post right Total Knee Arthroplasty. Doing well postoperatively. Pt does have a lot of stairs at home - we will see how she does.  We will increase her valium as pt is emotional today and we will monitor her progress./ most likely plan for home with [facility] tomorrow    Plan:      1: Continues current post-op course :  2:  Continue Deep venous thrombosis prophylaxis  3:  Continue physical therapy  4:  Continue Pain Control

## 2019-05-08 NOTE — OP NOTE
[address]                      [address] [place], [address] [facility]                                OPERATIVE REPORT    PATIENT NAME: Greer Fisher                    :        1945  MED REC NO:   [id number]                            ROOM:       N226  ACCOUNT NO:   [de-identified]                           ADMIT DATE: 2019  PROVIDER:     Meagan Keys MD    DATE OF PROCEDURE:  2019    PREOPERATIVE DIAGNOSIS:  Right knee DJD. POSTOPERATIVE DIAGNOSIS:  Right knee DJD. OPERATION PERFORMED:  1. Right total knee arthroplasty using a Araceli Persona size 6 cemented  posterior stabilized femoral component, size D Persona cemented tibial  tray with 12-mm Persona posterior stabilized polyethylene spacer, and  26-mm Persona all-poly patellar insert button. 2.  Utilization of Araceli IM5 computer alignment system. SURGEON:  Meagan Keys M.D.    ASSISTANT:  Ann-Marie Lange PA-C, was present throughout the entire case. Given the nature of the disease process and the procedure, a skilled  surgical first assistant was necessary during the case. The assistant  was necessary to hold retractors and manipulate the extremity during the  procedure. A certified scrub tech was at the back table managing the  instruments and supplies for the surgical case. ANESTHETIC:  Spinal plus IV sedation. COMPLICATION:  None. INDICATIONS:  The patient is a 67-year-old female with history of right  knee arthritis. She failed multiple conservative measures and elected  to proceed with total knee arthroplasty. Risks and benefits of knee  replacement were noted with the patient and family. These include  infection, stiffness, nerve damage, continued pain as well as need for  subsequent operations. Informed consent was obtained prior to arrival  in the operating room.     OPERATION AND FINDINGS:  The patient was brought to the operating room  and placed on the surgical table in the supine position whereupon  adequate anesthesia was then obtained. A surgical time-out was  performed. The patient received preoperative antibiotics. The patient  was prepped and draped in the usual sterile manner. The leg was then  exsanguinated with an Esmarch bandage and the tourniquet was applied at  300 mmHg. A linear midline incision was made from the superior pole of the patella  to the tibial tubercle, identifying the entire extensor mechanism. A  medial parapatellar arthrotomy was performed and the patella was  everted. The fat pad was excised and once this was complete the patient  was noted to have significant hypertrophic synovium with degenerative  joint disease and large osteophyte formation. Medial and lateral femoral retractors were inserted. The Araceli PSI  custom cutting guide was applied and pinned into position. Once this  was complete, the guide was removed and the distal femoral resection was  performed. With the distal femoral resection complete, the pins were  inserted over the distal femur and the multipurpose cutting guide was  applied and screwed into position. With the guide in position, the  anterior, posterior and chamfer cuts were made without difficulty. Once  the femoral cuts were complete, a posterior retractor was inserted. The  medial and lateral tibial retractors were inserted and the meniscal  remnants were excised. The Araceli custom PSI cutting pin guide for the  tibia was pinned in position. The guide was removed and the tibial  resection was made after checking the extramedullary alignment with the  extramedullary alignment device. Once the tibial resection was complete, the tibial components were  inserted. Patellar reaming was performed. Peg holes were drilled in  the patella and a trial patella was applied. The knee was placed  through a range of motion and once appropriate stability was obtained,  trial components were removed.     All

## 2019-05-09 VITALS
SYSTOLIC BLOOD PRESSURE: 147 MMHG | BODY MASS INDEX: 34.55 KG/M2 | OXYGEN SATURATION: 96 % | HEIGHT: 63 IN | HEART RATE: 99 BPM | TEMPERATURE: 98.8 F | WEIGHT: 195 LBS | RESPIRATION RATE: 16 BRPM | DIASTOLIC BLOOD PRESSURE: 64 MMHG

## 2019-05-09 LAB
HCT VFR BLD CALC: 33.2 % (ref 37–47)
HEMOGLOBIN: 11.3 G/DL (ref 12–16)
MCH RBC QN AUTO: 31.9 PG (ref 27–31.3)
MCHC RBC AUTO-ENTMCNC: 34 % (ref 33–37)
MCV RBC AUTO: 93.8 FL (ref 82–100)
ORGANISM: ABNORMAL
PDW BLD-RTO: 13.1 % (ref 11.5–14.5)
PLATELET # BLD: 202 K/UL (ref 130–400)
RBC # BLD: 3.54 M/UL (ref 4.2–5.4)
URINE CULTURE, ROUTINE: ABNORMAL
URINE CULTURE, ROUTINE: ABNORMAL
WBC # BLD: 5.8 K/UL (ref 4.8–10.8)

## 2019-05-09 PROCEDURE — 97116 GAIT TRAINING THERAPY: CPT

## 2019-05-09 PROCEDURE — 2580000003 HC RX 258: Performed by: ORTHOPAEDIC SURGERY

## 2019-05-09 PROCEDURE — 6370000000 HC RX 637 (ALT 250 FOR IP): Performed by: ORTHOPAEDIC SURGERY

## 2019-05-09 PROCEDURE — 97535 SELF CARE MNGMENT TRAINING: CPT

## 2019-05-09 PROCEDURE — 85027 COMPLETE CBC AUTOMATED: CPT

## 2019-05-09 PROCEDURE — 36415 COLL VENOUS BLD VENIPUNCTURE: CPT

## 2019-05-09 PROCEDURE — 6370000000 HC RX 637 (ALT 250 FOR IP): Performed by: NURSE PRACTITIONER

## 2019-05-09 RX ORDER — TRAMADOL HYDROCHLORIDE 50 MG/1
50 TABLET ORAL EVERY 6 HOURS PRN
Qty: 28 TABLET | Refills: 0 | Status: SHIPPED | OUTPATIENT
Start: 2019-05-09 | End: 2019-05-16

## 2019-05-09 RX ORDER — ASPIRIN 81 MG/1
81 TABLET ORAL 2 TIMES DAILY
Qty: 60 TABLET | Refills: 0
Start: 2019-05-09 | End: 2019-11-14 | Stop reason: CLARIF

## 2019-05-09 RX ORDER — NITROFURANTOIN 25; 75 MG/1; MG/1
100 CAPSULE ORAL EVERY 12 HOURS SCHEDULED
Qty: 14 CAPSULE | Refills: 0 | OUTPATIENT
Start: 2019-05-09 | End: 2019-05-16

## 2019-05-09 RX ORDER — SENNA AND DOCUSATE SODIUM 50; 8.6 MG/1; MG/1
1 TABLET, FILM COATED ORAL 2 TIMES DAILY
Qty: 60 TABLET | Refills: 0 | Status: SHIPPED | OUTPATIENT
Start: 2019-05-09 | End: 2019-06-08

## 2019-05-09 RX ORDER — LIDOCAINE 4 G/G
2 PATCH TOPICAL DAILY
Status: DISCONTINUED | OUTPATIENT
Start: 2019-05-09 | End: 2019-05-09 | Stop reason: HOSPADM

## 2019-05-09 RX ORDER — DIAZEPAM 5 MG/1
5 TABLET ORAL EVERY 6 HOURS PRN
Qty: 28 TABLET | Refills: 0 | Status: SHIPPED | OUTPATIENT
Start: 2019-05-09 | End: 2019-05-19

## 2019-05-09 RX ORDER — OXYCODONE HYDROCHLORIDE 5 MG/1
5 TABLET ORAL EVERY 4 HOURS PRN
Qty: 40 TABLET | Refills: 0 | Status: SHIPPED | OUTPATIENT
Start: 2019-05-09 | End: 2019-05-23

## 2019-05-09 RX ORDER — NITROFURANTOIN 25; 75 MG/1; MG/1
100 CAPSULE ORAL EVERY 12 HOURS SCHEDULED
Qty: 14 CAPSULE | Refills: 0 | Status: SHIPPED | OUTPATIENT
Start: 2019-05-09 | End: 2019-05-16

## 2019-05-09 RX ORDER — NITROFURANTOIN 25; 75 MG/1; MG/1
100 CAPSULE ORAL EVERY 12 HOURS SCHEDULED
Status: DISCONTINUED | OUTPATIENT
Start: 2019-05-09 | End: 2019-05-09 | Stop reason: HOSPADM

## 2019-05-09 RX ADMIN — OXYCODONE HYDROCHLORIDE 5 MG: 5 TABLET ORAL at 02:08

## 2019-05-09 RX ADMIN — DIAZEPAM 5 MG: 5 TABLET ORAL at 04:14

## 2019-05-09 RX ADMIN — OXYCODONE HYDROCHLORIDE 5 MG: 5 TABLET ORAL at 10:32

## 2019-05-09 RX ADMIN — Medication 10 ML: at 08:13

## 2019-05-09 RX ADMIN — OXYCODONE HYDROCHLORIDE 5 MG: 5 TABLET ORAL at 06:32

## 2019-05-09 RX ADMIN — TRAMADOL HYDROCHLORIDE 50 MG: 50 TABLET, FILM COATED ORAL at 04:14

## 2019-05-09 RX ADMIN — CALCIUM 500 MG: 500 TABLET ORAL at 08:12

## 2019-05-09 RX ADMIN — NITROFURANTOIN MONOHYDRATE/MACROCRYSTALLINE 100 MG: 25; 75 CAPSULE ORAL at 08:53

## 2019-05-09 RX ADMIN — ASPIRIN 81 MG: 81 TABLET, COATED ORAL at 08:12

## 2019-05-09 RX ADMIN — SENNOSIDES,DOCUSATE SODIUM 1 TABLET: 50; 8.6 TABLET, FILM COATED ORAL at 08:12

## 2019-05-09 ASSESSMENT — PAIN SCALES - GENERAL
PAINLEVEL_OUTOF10: 4
PAINLEVEL_OUTOF10: 2
PAINLEVEL_OUTOF10: 4
PAINLEVEL_OUTOF10: 7
PAINLEVEL_OUTOF10: 5
PAINLEVEL_OUTOF10: 3
PAINLEVEL_OUTOF10: 4
PAINLEVEL_OUTOF10: 4

## 2019-05-09 ASSESSMENT — ENCOUNTER SYMPTOMS
SHORTNESS OF BREATH: 0
COUGH: 0
NAUSEA: 0
DIARRHEA: 0
VOMITING: 0

## 2019-05-09 ASSESSMENT — PAIN DESCRIPTION - PAIN TYPE
TYPE: SURGICAL PAIN
TYPE: SURGICAL PAIN

## 2019-05-09 ASSESSMENT — PAIN DESCRIPTION - ORIENTATION
ORIENTATION: RIGHT
ORIENTATION: RIGHT

## 2019-05-09 ASSESSMENT — PAIN DESCRIPTION - LOCATION
LOCATION: KNEE
LOCATION: KNEE

## 2019-05-09 NOTE — CARE COORDINATION
MET WITH PATIENT TO CONFIRM DC PLAN. SHE WILL GO HOME WITH [facility]. NOTIFIED Laymon Pages. SHE HAS ALL INFO. PATIENT HAS ALL NEEDED DME.    [address] [facility]

## 2019-05-09 NOTE — FLOWSHEET NOTE
Assessment complete, pt alert and oriented x 4, pain 4/10, denies n/v, denies sob & cp, call light in reach, bed in lowest position, bed alarm engaged, will monitor.

## 2019-05-09 NOTE — PROGRESS NOTES
Juliet Styles is a 69 y.o. female patient.  Pt was seen and evaluated, nausea and vomiting resolved, feeling a lot better, no overnight events, does not feel constipated    Current Facility-Administered Medications   Medication Dose Route Frequency Provider Last Rate Last Dose    lidocaine 4 % external patch 2 patch  2 patch Transdermal Daily YASH Ibrahim CNP   2 patch at 05/09/19 0853    nitrofurantoin (macrocrystal-monohydrate) (MACROBID) capsule 100 mg  100 mg Oral 2 times per day YASH Ibrahim - CNP   100 mg at 05/09/19 0853    calcium elemental (OSCAL) tablet 500 mg  1 tablet Oral Daily YASH Ibrahim - CNP   500 mg at 05/09/19 0812    bimatoprost (LUMIGAN) 0.01 % ophthalmic drops 1 drop  1 drop Both Eyes Nightly YASH Ibrahim CNP   1 drop at 05/08/19 2122    metoprolol tartrate (LOPRESSOR) tablet 12.5 mg  12.5 mg Oral Dinner YASH Ibrahim - CNP   12.5 mg at 05/08/19 1815    pill splitter   Does not apply Once Ml Castillo MD        diazepam (VALIUM) tablet 5 mg  5 mg Oral Q6H PRN YASH Ibrahim - CNP   5 mg at 05/09/19 0414    traMADol (ULTRAM) tablet 50 mg  50 mg Oral Q6H PRN YASH Ibrahim - CNP   50 mg at 05/09/19 0414    0.9 % sodium chloride infusion   Intravenous Continuous Nohelia Fong MD 50 mL/hr at 05/07/19 1417      sodium chloride flush 0.9 % injection 10 mL  10 mL Intravenous 2 times per day Nohelia Fong MD   10 mL at 05/09/19 0813    sodium chloride flush 0.9 % injection 10 mL  10 mL Intravenous PRN Nohelia Fong MD        acetaminophen (TYLENOL) tablet 650 mg  650 mg Oral Q4H PRN Nohelia Fong MD        oxyCODONE (ROXICODONE) immediate release tablet 5 mg  5 mg Oral Q4H PRN Nohelia Fong MD   5 mg at 05/09/19 1032    morphine (PF) injection 2 mg  2 mg Intravenous Q2H PRN Nohelia Fong MD        Or    morphine injection 4 mg  4 mg Intravenous Q2H PRN Nohelia Fong MD   4 mg at 05/08/19 1033    Plan  1) encounter for post surgical care  Pain is stbale  2) HTN   Resume lopressors  3) DVT ppx  4) UTI   D/c on po bactrim  Allen Longoria MD  5/9/2019

## 2019-05-09 NOTE — PROGRESS NOTES
Physical Therapy Med Surg Daily Treatment Note  Facility/Department: [facility]  Room: N226/N226-       NAME: Luanne Cullen  : 1945 (52 y.o.)  MRN: [id number]  CODE STATUS: Full Code    Date of Service: 2019    Patient Diagnosis(es): H/O total knee replacement, right [Z96.651]   No chief complaint on file. Patient Active Problem List    Diagnosis Date Noted    H/O total knee replacement, right 2019    UTI (urinary tract infection) 2019    Right knee DJD 2019    Glaucoma of both eyes 2019    Conjunctivitis of both eyes due to rosacea 2019    Rosacea, acne 2019    Aseptic meningitis 2013    Hypogammaglobulinemia ([address] 2013        Past Medical History:   Diagnosis Date    Arthritis     both knees    Glaucoma     Meningitis     PONV (postoperative nausea and vomiting)      Past Surgical History:   Procedure Laterality Date    CARPAL TUNNEL RELEASE Left    1 [facility]    CHOLECYSTECTOMY      COLONOSCOPY      ENDOSCOPY, COLON, DIAGNOSTIC      EYE SURGERY      phaco with IOL OU    EYE SURGERY      Lasik OU    TONSILLECTOMY AND ADENOIDECTOMY      as child    TOTAL KNEE ARTHROPLASTY Right 2019    RIGHT TOTAL KNEE ARTHROPLASTY, SUPINE, MARY PSI performed by Berhane Weber MD at [address] Bilateral     right  / left           Restrictions:  Restrictions/Precautions: Fall Risk, Weight Bearing  Lower Extremity Weight Bearing Restrictions  Right Lower Extremity Weight Bearing: Weight Bearing As Tolerated  Position Activity Restriction  Other position/activity restrictions: knee immobilizer until (+) SLR    SUBJECTIVE:  General  Chart Reviewed: Yes  Family / Caregiver Present: Yes(spouse)  Subjective  Subjective: I am going home today.    General Comment  Comments: POD #2 R TKA; (-) SLR this session    Pre Pain Assessment:  Pre Treatment Pain Screening  Pain at present: 3  Scale Used: Numeric Score  Intervention List: Patient able to continue with treatment  Pain Screening  Patient Currently in Pain: Yes       Post Pain Assessment:   Pain Assessment  Pain Assessment: 0-10  Pain Level: 3  Pain Type: Surgical pain  Pain Location: Knee  Pain Orientation: Right       OBJECTIVE:         Bed mobility  Rolling to Right: Stand by assistance  Supine to Sit: Stand by assistance  Comment: bed slightly elevated, increased time and effort to complete. Transfers  Sit to Stand: Stand by assistance  Stand to sit: Stand by assistance  Comment: increased time and effort to complete. vc's for hand placement. Ambulation  Ambulation?: Yes  Ambulation 1  Surface: level tile  Device: Rolling Walker  Other Apparatus: Knee Immobilizer  Assistance: Stand by assistance  Quality of Gait: FF posture with downward gaze, step-to gait pattern  Distance: 150'          Exercises  Quad Sets: x10  Heelslides: x 20  Gluteal Sets: x10  Hip Abduction: x 10  Ankle Pumps: x10                 ASSESSMENT:  Body structures, Functions, Activity limitations: Decreased functional mobility ; Decreased strength;Decreased endurance;Decreased balance;Decreased ROM; Increased Pain    Assessment: education given to pt and  about KI and supporting RLE during bed mobility.  Pt has no other concerns at this time,     Activity Tolerance  Activity Tolerance: Patient limited by pain       Discharge Recommendations:  Continue to assess pending progress, Patient would benefit from continued therapy after discharge    Goals:  Short term goals  Short term goal 1: indep with HEP  Long term goals  Long term goal 1: indep with bed mobility  Long term goal 2: pt to be supervision with transfers  Long term goal 3: pt to ambulate >50 ft supervision FWW  Long term goal 4: pt to navigate >8 steps with SBA  Patient Goals   Patient goals : agreeable to PT POC    PLAN:   Plan  Times per week: 5-7  Times per day: Twice a day  Current Treatment Recommendations: Strengthening, Neuromuscular Re-education, Functional Mobility Training, Home Exercise Program, Equipment Evaluation, Education, & procurement, Transfer Training, ROM, Gait Training, Safety Education & Training, Modalities, Balance Training, Endurance Training, Stair training, Pain Management, Patient/Caregiver Education & Training  Plan Comment: Cont. POC  Safety Devices  Type of devices:  All fall risk precautions in place, Bed alarm in place, Call light within reach, Left in bed     AMPA (6 CLICK) [address] Inpatient Mobility Raw Score : 18      Therapy Time   Individual   Time In 1305   Time Out 1328   Minutes 23      Gait: 12  Bm/Trsf: 8  Therex: 3       Jamin Frank PTA, 05/09/19 at 1:32 PM

## 2019-05-09 NOTE — PROGRESS NOTES
Progress Note   TKA    Subjective:     Post-Operative Day: 2 Status Post right Total Knee Arthroplasty  Systemic or Specific Complaints:No Complaints    Objective:     CURRENT VITALS:  BP (!) 148/67   Pulse 86   Temp 98.3 °F (36.8 °C) (Oral)   Resp 18   Ht 5' 3\" (1.6 m)   Wt 195 lb (88.5 kg)   LMP 04/25/1999   SpO2 98%   BMI 34.54 kg/m²     General: alert, appears stated age and cooperative   Wound: Wound clean and dry no evidence of infection. Motion: Painful range of Motion   DVT Exam: No evidence of DVT seen on physical exam.       Knee swollen but thigh soft to palpation. Moving foot and ankle. Good distal pulses. Data Review  Recent Labs     05/08/19  0550 05/09/19  0543   WBC 6.8 5.8   RBC 3.75* 3.54*   HGB 11.9* 11.3*   HCT 34.8* 33.2*   MCV 93.0 93.8   MCH 31.8* 31.9*   MCHC 34.2 34.0   RDW 12.8 13.1    202         Assessment:     Status Post right Total Knee Arthroplasty. Doing well postoperatively. Pt had increased pain yesterday- she is doing better today. Today she states it's more of a sourness. We will try lidoderm patches and see if any further relieve is noted.  Charito Flavors to d/c home with [facility] later today   pt does have a UTI- which she had prior to hospitalization same bacteria- we will switch her to macrobid    Plan:      1: Discharge today, Return to Clinic:  :  2:  Continue Deep venous thrombosis prophylaxis  3:  Continue physical therapy  4:  Continue Pain Control

## 2019-05-09 NOTE — PROGRESS NOTES
Physical Therapy Med Surg Daily Treatment Note  Facility/Department: [facility] NEURO  Room: N226/N226-01       NAME: Juliet Styles  : 1945 (7 y.o.)  MRN: [id number]  CODE STATUS: Full Code    Date of Service: 2019    Patient Diagnosis(es): H/O total knee replacement, right [Z96.651]   No chief complaint on file. Patient Active Problem List    Diagnosis Date Noted    H/O total knee replacement, right 2019    UTI (urinary tract infection) 2019    Right knee DJD 2019    Glaucoma of both eyes 2019    Conjunctivitis of both eyes due to rosacea 2019    Rosacea, acne 2019    Aseptic meningitis 2013    Hypogammaglobulinemia ([address] 2013        Past Medical History:   Diagnosis Date    Arthritis     both knees    Glaucoma 2017    Meningitis     PONV (postoperative nausea and vomiting)      Past Surgical History:   Procedure Laterality Date    CARPAL TUNNEL RELEASE Left    1 [facility]    CHOLECYSTECTOMY      COLONOSCOPY      ENDOSCOPY, COLON, DIAGNOSTIC      EYE SURGERY      phaco with IOL OU    EYE SURGERY      Lasik OU    TONSILLECTOMY AND ADENOIDECTOMY      as child    TOTAL KNEE ARTHROPLASTY Right 2019    RIGHT TOTAL KNEE ARTHROPLASTY, SUPINE, MARY PSI performed by Nohelia Fong MD at [address] Bilateral     right  / left           Restrictions:  Restrictions/Precautions: Fall Risk, Weight Bearing  Lower Extremity Weight Bearing Restrictions  Right Lower Extremity Weight Bearing: Weight Bearing As Tolerated  Position Activity Restriction  Other position/activity restrictions: knee immobilizer until (+) SLR    SUBJECTIVE:  General  Chart Reviewed: Yes  Family / Caregiver Present: Yes(daughter)  Subjective  Subjective: It hurts so much.    General Comment  Comments: POD #2 R TKA; (-) SLR this session    Pre Pain Assessment:  Pre Treatment Pain Screening  Pain at goal 3: pt to ambulate >50 ft supervision FWW  Long term goal 4: pt to navigate >8 steps with SBA  Patient Goals   Patient goals : agreeable to PT POC    PLAN:   Plan  Times per week: 5-7  Times per day: Twice a day  Current Treatment Recommendations: Strengthening, Neuromuscular Re-education, Functional Mobility Training, Home Exercise Program, Equipment Evaluation, Education, & procurement, Transfer Training, ROM, Gait Training, Safety Education & Training, Modalities, Balance Training, Endurance Training, Stair training, Pain Management, Patient/Caregiver Education & Training  Plan Comment: Cont. POC  Safety Devices  Type of devices:  All fall risk precautions in place, Call light within reach, Chair alarm in place, Left in chair     AMPA (6 CLICK) Malathi. [address] Inpatient Mobility Raw Score : 18      Therapy Time   Individual   Time In 0834   Time Out 0915   Minutes 41      Gait: 30  BM/Trsf: [address] [facility], 05/09/19 at 9:20 AM

## 2019-05-09 NOTE — FLOWSHEET NOTE
Home health care notified that patient is being discharged.  Electronically signed by Tess Bowers RN on 5/9/19 at 2:12 PM

## 2019-05-10 NOTE — PROGRESS NOTES
Physical Therapy  Facility/Department: [facility] MED SURG [id number]  Physical Therapy Discharge      NAME: Cheryl Milligan    : 1945 (49 y.o.)  MRN: [id number]    Account: [de-identified]  Gender: female      Patient has been discharged from acute care hospital. DC patient from current PT program.      Electronically signed by Boris Esquivel PT on 5/10/19 at 9:16 AM

## 2019-05-13 NOTE — DISCHARGE SUMMARY
Discharge summary  This patient Alexis Musa was admitted to the hospital on 5/7/2019  after undergoing Procedure(s) (LRB):  RIGHT TOTAL KNEE ARTHROPLASTY, SUPINE, MARY PSI (Right) without complications that morning. During the postoperative period,while in hospital, patient was medically managed by the hospitalist. Please see medial notes and H&P for patients additional diagnoses. Ortho agrees with all medical diagnoses and treatments while patient in hospital.  No significant or unexpected findings or abnormal ortho imaging were noted during the hospital stay    Hospital course  Patient tolerated surgical procedure well and there was no complications. Patient progressed adequtly through their recovery during hospital stay including PT and rehabilitation. DVT prophylaxis was implemented POD#1  Patient was then D/C on 5/9/2019 to Home  in stable condition. Patient was instructed on the use of pain medications, the signs and symptoms of infection, and was given our number to call should they have any questions or concerns following discharge.

## 2019-05-25 PROBLEM — N39.0 UTI (URINARY TRACT INFECTION): Chronic | Status: RESOLVED | Noted: 2019-04-25 | Resolved: 2019-05-25

## 2019-11-14 ENCOUNTER — OFFICE VISIT (OUTPATIENT)
Dept: OBGYN CLINIC | Age: 74
End: 2019-11-14
Payer: MEDICARE

## 2019-11-14 VITALS — WEIGHT: 189 LBS | BODY MASS INDEX: 33.48 KG/M2 | DIASTOLIC BLOOD PRESSURE: 70 MMHG | SYSTOLIC BLOOD PRESSURE: 138 MMHG

## 2019-11-14 DIAGNOSIS — Z12.31 SCREENING MAMMOGRAM, ENCOUNTER FOR: ICD-10-CM

## 2019-11-14 DIAGNOSIS — Z01.419 WELL WOMAN EXAM: Primary | ICD-10-CM

## 2019-11-14 PROCEDURE — G0101 CA SCREEN;PELVIC/BREAST EXAM: HCPCS | Performed by: OBSTETRICS & GYNECOLOGY

## 2019-11-14 PROCEDURE — G8484 FLU IMMUNIZE NO ADMIN: HCPCS | Performed by: OBSTETRICS & GYNECOLOGY

## 2019-11-14 RX ORDER — ACETAMINOPHEN 500 MG
500 TABLET ORAL EVERY 6 HOURS PRN
COMMUNITY

## 2019-11-14 RX ORDER — METOPROLOL SUCCINATE 25 MG/1
TABLET, EXTENDED RELEASE ORAL
Refills: 1 | COMMUNITY
Start: 2019-10-27

## 2019-11-14 ASSESSMENT — ENCOUNTER SYMPTOMS
ABDOMINAL DISTENTION: 0
DIARRHEA: 0
ALLERGIC/IMMUNOLOGIC NEGATIVE: 1
RECTAL PAIN: 0
NAUSEA: 0
ABDOMINAL PAIN: 0
ANAL BLEEDING: 0
EYES NEGATIVE: 1
RESPIRATORY NEGATIVE: 1
VOMITING: 0
CONSTIPATION: 0
BLOOD IN STOOL: 0

## 2019-11-20 ENCOUNTER — HOSPITAL ENCOUNTER (OUTPATIENT)
Dept: WOMENS IMAGING | Age: 74
Discharge: HOME | End: 2019-11-22
Payer: MEDICARE

## 2019-11-20 DIAGNOSIS — Z12.31 SCREENING MAMMOGRAM, ENCOUNTER FOR: ICD-10-CM

## 2019-11-20 PROCEDURE — 77063 BREAST TOMOSYNTHESIS BI: CPT

## 2020-12-15 ENCOUNTER — OFFICE VISIT (OUTPATIENT)
Dept: OBGYN CLINIC | Age: 75
End: 2020-12-15
Payer: COMMERCIAL

## 2020-12-15 VITALS — WEIGHT: 184 LBS | DIASTOLIC BLOOD PRESSURE: 66 MMHG | SYSTOLIC BLOOD PRESSURE: 138 MMHG | BODY MASS INDEX: 32.59 KG/M2

## 2020-12-15 PROCEDURE — G8484 FLU IMMUNIZE NO ADMIN: HCPCS | Performed by: OBSTETRICS & GYNECOLOGY

## 2020-12-15 PROCEDURE — 99387 INIT PM E/M NEW PAT 65+ YRS: CPT | Performed by: OBSTETRICS & GYNECOLOGY

## 2020-12-15 ASSESSMENT — PATIENT HEALTH QUESTIONNAIRE - PHQ9
2. FEELING DOWN, DEPRESSED OR HOPELESS: 0
SUM OF ALL RESPONSES TO PHQ QUESTIONS 1-9: 0
SUM OF ALL RESPONSES TO PHQ QUESTIONS 1-9: 0
1. LITTLE INTEREST OR PLEASURE IN DOING THINGS: 0
SUM OF ALL RESPONSES TO PHQ9 QUESTIONS 1 & 2: 0
SUM OF ALL RESPONSES TO PHQ QUESTIONS 1-9: 0

## 2020-12-15 ASSESSMENT — VISUAL ACUITY: OU: 1

## 2020-12-15 NOTE — PROGRESS NOTES
Subjective:      Patient ID: Latisha Edward is a 77 y.o. female    Annual exam.  No PMB. No GYN complaints. Pap deferred. Screening mammogram ordered. Monthly SBE encouraged. Dexa scan ordered per protocol. Screening colonoscopy recommended per routine  . F/U annual exam or prn.     Vitals:  /66   Wt 184 lb (83.5 kg)   LMP 1999   BMI 32.59 kg/m²   Past Medical History:   Diagnosis Date    Arthritis     both knees    Colon polyps     Glaucoma 2017    Meningitis     PONV (postoperative nausea and vomiting)     Rotator cuff injury     Sciatica      Past Surgical History:   Procedure Laterality Date    CARPAL TUNNEL RELEASE Left s     [facility]    CHOLECYSTECTOMY      COLONOSCOPY      COLONOSCOPY  2018    polyp removal    ENDOSCOPY, COLON, DIAGNOSTIC      EYE SURGERY  s    phaco with IOL OU    EYE SURGERY      Lasik OU    TONSILLECTOMY AND ADENOIDECTOMY      as child    TOTAL KNEE ARTHROPLASTY Right 2019    RIGHT TOTAL KNEE ARTHROPLASTY, SUPINE, MARY PSI performed by Mishel Woodward MD at [address] Bilateral     right  / left      Allergies:  Phenergan [promethazine hcl]  Current Outpatient Medications   Medication Sig Dispense Refill    CRANBERRY PO Take by mouth      Multiple Vitamins-Minerals (MULTIVITAMIN ADULTS PO) Take by mouth      acetaminophen (TYLENOL) 500 MG tablet Take 500 mg by mouth every 6 hours as needed for Pain      metoprolol succinate (TOPROL XL) 25 MG extended release tablet TAKE 1/2 TABLET BY MOUTH DAILY WITH SUPPER  1    calcium-vitamin D (OSCAL-500) 500-200 MG-UNIT per tablet Take 1 tablet by mouth daily      vitamin C (ASCORBIC ACID) 500 MG tablet Take 1,000 mg by mouth daily      LUMIGAN 0.01 % SOLN ophthalmic drops Place 1 drop into both eyes nightly       Probiotic Product (PROBIOTIC PO) Take by mouth       No current facility-administered medications for this visit.       Social History     Socioeconomic History    Marital status:      Spouse name: Not on file    Number of children: Not on file    Years of education: Not on file    Highest education level: Not on file   Occupational History    Not on file   Social Needs    Financial resource strain: Not on file    Food insecurity     Worry: Not on file     Inability: Not on file    Transportation needs     Medical: Not on file     Non-medical: Not on file   Tobacco Use    Smoking status: Never Smoker    Smokeless tobacco: Never Used   Substance and Sexual Activity    Alcohol use: Yes     Comment: occ    Drug use: No    Sexual activity: Yes     Partners: Male   Lifestyle    Physical activity     Days per week: Not on file     Minutes per session: Not on file    Stress: Not on file   Relationships    Social connections     Talks on phone: Not on file     Gets together: Not on file     Attends Hinduism service: Not on file     Active member of club or organization: Not on file     Attends meetings of clubs or organizations: Not on file     Relationship status: Not on file    Intimate partner violence     Fear of current or ex partner: Not on file     Emotionally abused: Not on file     Physically abused: Not on file     Forced sexual activity: Not on file   Other Topics Concern    Not on file   Social History Narrative    Not on file     Family History   Problem Relation Age of Onset    COPD Mother     Coronary Art Dis Mother     COPD Father     Dementia Father     Other Father         series of pneumonia     No Known Problems Sister     Heart Defect Maternal Grandmother         enlarged heart     Hearing Loss Maternal Grandmother     Hearing Loss Maternal Grandfather     Heart Attack Paternal Grandmother     Coronary Art Dis Paternal Grandmother     Colon Cancer Paternal Grandfather     No Known Problems Other     Other Daughter         migraines    Arthritis Daughter         s/p LTHR    No Known Problems Son     Breast Cancer Neg Hx     Cancer Neg Hx     Diabetes Neg Hx     Eclampsia Neg Hx     Hypertension Neg Hx     Ovarian Cancer Neg Hx      Labor Neg Hx     Spont Abortions Neg Hx     Stroke Neg Hx        Review of Systems    Objective:     Physical Exam  Constitutional:       Appearance: She is well-developed. HENT:      Head: Normocephalic. Eyes:      General: Lids are normal. Vision grossly intact. Neck:      Musculoskeletal: Normal range of motion and neck supple. Thyroid: No thyromegaly. Cardiovascular:      Rate and Rhythm: Normal rate and regular rhythm. Heart sounds: Normal heart sounds. Pulmonary:      Effort: Pulmonary effort is normal. No respiratory distress. Breath sounds: Normal breath sounds. No wheezing or rales. Chest:      Chest wall: No tenderness. Breasts:         Right: Normal. No swelling, bleeding, inverted nipple, mass, nipple discharge, skin change or tenderness. Left: Normal. No swelling, bleeding, inverted nipple, mass, nipple discharge, skin change or tenderness. Abdominal:      General: There is no distension. Palpations: Abdomen is soft. There is no mass. Tenderness: There is no abdominal tenderness. There is no guarding or rebound. Hernia: No hernia is present. There is no hernia in the left inguinal area or right inguinal area. Genitourinary:     General: Normal vulva. Pubic Area: No rash. Labia:         Right: No rash, tenderness, lesion or injury. Left: No rash, tenderness, lesion or injury. Urethra: No prolapse, urethral swelling or urethral lesion. Vagina: Normal. No signs of injury and foreign body. No vaginal discharge, erythema, tenderness or bleeding. Cervix: No cervical motion tenderness, discharge or friability. Uterus: Not deviated, not enlarged, not fixed and not tender.        Adnexa:         Right: No mass, tenderness or fullness. Left: No mass, tenderness or fullness. Rectum: Normal.   Musculoskeletal: Normal range of motion. General: No tenderness. Lymphadenopathy:      Cervical: No cervical adenopathy. Upper Body:      Right upper body: No supraclavicular or axillary adenopathy. Left upper body: No supraclavicular or axillary adenopathy. Lower Body: No right inguinal adenopathy. No left inguinal adenopathy. Skin:     General: Skin is warm and dry. Coloration: Skin is not pale. Findings: No erythema or rash. Neurological:      Mental Status: She is alert and oriented to person, place, and time. Psychiatric:         Behavior: Behavior normal.         Thought Content: Thought content normal.         Judgment: Judgment normal.         Assessment:      Diagnosis Orders   1. Well woman exam with routine gynecological exam     2. Screening mammogram, encounter for  KISHOR DIGITAL SCREEN W OR WO CAD BILATERAL         Plan:      Medications placedthis encounter:  No orders of the defined types were placed in this encounter. Orders placedthis encounter:  Orders Placed This Encounter   Procedures    KISHOR DIGITAL SCREEN W OR WO CAD BILATERAL     Standing Status:   Future     Standing Expiration Date:   12/15/2021         Follow up:  Return in about 1 year (around 12/15/2021) for Annual.   Repeat Annual GYN exam every 1 year. Cervical Cytology exam starts age 24. If Negative Cytology;  follow-up screening per current guidelines. Mammograms yearly starting at age 36. Calcium and Vitamin D dosing reviewed ( age appropriate ). Colonoscopy and bone density screening discussed ( age appropriate ). Birth control and STD prevention discussed ( age appropriate ). Gardisil counseling completed for all patients 7-33 yo. Routine health maintenance ( per PCP and guidelines ).

## 2021-01-25 ENCOUNTER — HOSPITAL ENCOUNTER (OUTPATIENT)
Dept: WOMENS IMAGING | Age: 76
Discharge: HOME | End: 2021-01-27
Payer: COMMERCIAL

## 2021-01-25 DIAGNOSIS — Z12.31 SCREENING MAMMOGRAM, ENCOUNTER FOR: ICD-10-CM

## 2021-01-25 PROCEDURE — 77067 SCR MAMMO BI INCL CAD: CPT

## 2021-12-20 ENCOUNTER — OFFICE VISIT (OUTPATIENT)
Dept: OBGYN CLINIC | Age: 76
End: 2021-12-20
Payer: MEDICARE

## 2021-12-20 VITALS — WEIGHT: 192 LBS | SYSTOLIC BLOOD PRESSURE: 138 MMHG | DIASTOLIC BLOOD PRESSURE: 64 MMHG | BODY MASS INDEX: 34.01 KG/M2

## 2021-12-20 DIAGNOSIS — Z78.0 POSTMENOPAUSAL: ICD-10-CM

## 2021-12-20 DIAGNOSIS — N93.9 VAGINAL SPOTTING: ICD-10-CM

## 2021-12-20 DIAGNOSIS — Z01.419 WELL WOMAN EXAM WITH ROUTINE GYNECOLOGICAL EXAM: Primary | ICD-10-CM

## 2021-12-20 DIAGNOSIS — Z12.31 SCREENING MAMMOGRAM, ENCOUNTER FOR: ICD-10-CM

## 2021-12-20 PROCEDURE — 99212 OFFICE O/P EST SF 10 MIN: CPT | Performed by: OBSTETRICS & GYNECOLOGY

## 2021-12-20 PROCEDURE — G0101 CA SCREEN;PELVIC/BREAST EXAM: HCPCS | Performed by: OBSTETRICS & GYNECOLOGY

## 2021-12-20 PROCEDURE — G8484 FLU IMMUNIZE NO ADMIN: HCPCS | Performed by: OBSTETRICS & GYNECOLOGY

## 2021-12-20 RX ORDER — FLUCONAZOLE 150 MG/1
150 TABLET ORAL PRN
Qty: 3 TABLET | Refills: 1 | Status: SHIPPED | OUTPATIENT
Start: 2021-12-20

## 2021-12-20 ASSESSMENT — ENCOUNTER SYMPTOMS
ABDOMINAL DISTENTION: 0
NAUSEA: 0
ALLERGIC/IMMUNOLOGIC NEGATIVE: 1
VOMITING: 0
RECTAL PAIN: 0
ABDOMINAL PAIN: 0
BLOOD IN STOOL: 0
ANAL BLEEDING: 0
DIARRHEA: 0
RESPIRATORY NEGATIVE: 1
EYES NEGATIVE: 1
CONSTIPATION: 0

## 2021-12-20 ASSESSMENT — PATIENT HEALTH QUESTIONNAIRE - PHQ9
SUM OF ALL RESPONSES TO PHQ QUESTIONS 1-9: 0
1. LITTLE INTEREST OR PLEASURE IN DOING THINGS: 0
SUM OF ALL RESPONSES TO PHQ QUESTIONS 1-9: 0
SUM OF ALL RESPONSES TO PHQ QUESTIONS 1-9: 0
SUM OF ALL RESPONSES TO PHQ9 QUESTIONS 1 & 2: 0
2. FEELING DOWN, DEPRESSED OR HOPELESS: 0

## 2021-12-20 ASSESSMENT — VISUAL ACUITY: OU: 1

## 2021-12-20 NOTE — PROGRESS NOTES
Subjective:      Patient ID: Stef Alberto is a 77 y.o. female    Annual exam.   No GYN complaints. Pap deferred. Screening mammogram ordered. Monthly SBE encouraged. Dexa scan ordered per protocol. Screening colonoscopy recommended per routine  . F/U annual exam or prn. Pt also wished to discuss few episodes of PM spotting  extending her appointment time by 10 minutes. States she has had chronic UTI since August being managed with PCP. States did not notice spotting with wiping until that time. Also feels that she has had intermittent yeast sx with so many abx treatments. SSE  Normal today, but provided Diflucan Rx for home if needed. Also ordered US to check endometrial ECHO to reassure no concern. Suspect spotting is urinary. All questions answered.         Vitals:  /64   Wt 192 lb (87.1 kg)   LMP 1999   BMI 34.01 kg/m²   Past Medical History:   Diagnosis Date    Arthritis     both knees    Colon polyps     Glaucoma 2017    Meningitis     PONV (postoperative nausea and vomiting)     Rotator cuff injury     Sciatica      Past Surgical History:   Procedure Laterality Date    CARPAL TUNNEL RELEASE Left 2000s     [facility]    CHOLECYSTECTOMY      COLONOSCOPY      COLONOSCOPY  2018    polyp removal    ENDOSCOPY, COLON, DIAGNOSTIC      EYE SURGERY  2000s    phaco with IOL OU    EYE SURGERY      Lasik OU    TONSILLECTOMY AND ADENOIDECTOMY      as child    TOTAL KNEE ARTHROPLASTY Right 2019    RIGHT TOTAL KNEE ARTHROPLASTY, SUPINE, MARY PSI performed by Ofelia Herrera MD at [address] Bilateral     right  / left      Allergies:  Phenergan [promethazine hcl]  Current Outpatient Medications   Medication Sig Dispense Refill    fluconazole (DIFLUCAN) 150 MG tablet Take 1 tablet by mouth as needed (yeast infection) 3 tablet 1    CRANBERRY PO Take by mouth      Multiple Vitamins-Minerals Organizations: Not on file    Attends Club or Organization Meetings: Not on file    Marital Status: Not on file   Intimate Partner Violence:     Fear of Current or Ex-Partner: Not on file    Emotionally Abused: Not on file    Physically Abused: Not on file    Sexually Abused: Not on file   Housing Stability:     Unable to Pay for Housing in the Last Year: Not on file    Number of Places Lived in the Last Year: Not on file    Unstable Housing in the Last Year: Not on file     Family History   Problem Relation Age of Onset    COPD Mother     Coronary Art Dis Mother     COPD Father     Dementia Father     Other Father         series of pneumonia     No Known Problems Sister     Heart Defect Maternal Grandmother         enlarged heart     Hearing Loss Maternal Grandmother     Hearing Loss Maternal Grandfather     Heart Attack Paternal Grandmother     Coronary Art Dis Paternal Grandmother     Colon Cancer Paternal Grandfather     No Known Problems Other     Other Daughter         migraines    Arthritis Daughter         s/p LTHR    No Known Problems Son     Breast Cancer Neg Hx     Cancer Neg Hx     Diabetes Neg Hx     Eclampsia Neg Hx     Hypertension Neg Hx     Ovarian Cancer Neg Hx      Labor Neg Hx     Spont Abortions Neg Hx     Stroke Neg Hx        Review of Systems   Constitutional: Negative. Negative for activity change, appetite change, chills, diaphoresis, fatigue, fever and unexpected weight change. HENT: Negative. Eyes: Negative. Respiratory: Negative. Cardiovascular: Negative. Gastrointestinal: Negative for abdominal distention, abdominal pain, anal bleeding, blood in stool, constipation, diarrhea, nausea, rectal pain and vomiting. Endocrine: Negative. Genitourinary: Positive for vaginal bleeding.  Negative for decreased urine volume, difficulty urinating, dyspareunia, dysuria, enuresis, flank pain, frequency, genital sores, hematuria, menstrual problem, pelvic pain, urgency, vaginal discharge and vaginal pain. Musculoskeletal: Negative. Skin: Negative. Allergic/Immunologic: Negative. Neurological: Negative. Hematological: Negative. Psychiatric/Behavioral: Negative. Objective:     Physical Exam  Constitutional:       General: She is not in acute distress. Appearance: She is well-developed. She is not diaphoretic. HENT:      Head: Normocephalic and atraumatic. Eyes:      General: Lids are normal. Vision grossly intact. Conjunctiva/sclera: Conjunctivae normal.   Neck:      Thyroid: No thyromegaly. Cardiovascular:      Rate and Rhythm: Normal rate and regular rhythm. Heart sounds: Normal heart sounds. Pulmonary:      Effort: Pulmonary effort is normal. No respiratory distress. Breath sounds: Normal breath sounds. No wheezing or rales. Chest:      Chest wall: No tenderness. Breasts:      Right: Normal. No swelling, bleeding, inverted nipple, mass, nipple discharge, skin change, tenderness, axillary adenopathy or supraclavicular adenopathy. Left: Normal. No swelling, bleeding, inverted nipple, mass, nipple discharge, skin change, tenderness, axillary adenopathy or supraclavicular adenopathy. Abdominal:      General: There is no distension. Palpations: Abdomen is soft. There is no mass. Tenderness: There is no abdominal tenderness. There is no guarding or rebound. Hernia: No hernia is present. There is no hernia in the left inguinal area or right inguinal area. Genitourinary:     General: Normal vulva. Pubic Area: No rash. Labia:         Right: No rash, tenderness, lesion or injury. Left: No rash, tenderness, lesion or injury. Urethra: No prolapse, urethral swelling or urethral lesion. Vagina: Normal. No signs of injury and foreign body. No vaginal discharge, erythema, tenderness or bleeding.       Cervix: No cervical motion tenderness, discharge or friability. Uterus: Not deviated, not enlarged, not fixed and not tender. Adnexa:         Right: No mass, tenderness or fullness. Left: No mass, tenderness or fullness. Rectum: Normal.   Musculoskeletal:         General: No tenderness or deformity. Normal range of motion. Cervical back: Normal range of motion and neck supple. Lymphadenopathy:      Cervical: No cervical adenopathy. Upper Body:      Right upper body: No supraclavicular or axillary adenopathy. Left upper body: No supraclavicular or axillary adenopathy. Lower Body: No right inguinal adenopathy. No left inguinal adenopathy. Skin:     General: Skin is warm and dry. Coloration: Skin is not pale. Findings: No erythema or rash. Neurological:      Mental Status: She is alert and oriented to person, place, and time. Motor: No abnormal muscle tone. Coordination: Coordination normal.   Psychiatric:         Behavior: Behavior normal.         Thought Content: Thought content normal.         Judgment: Judgment normal.         Assessment:      Diagnosis Orders   1. Well woman exam with routine gynecological exam     2. Screening mammogram, encounter for  KISHOR DIGITAL SCREEN W OR WO CAD BILATERAL   3. Vaginal spotting  US PELVIS COMPLETE   4. Postmenopausal  US PELVIS COMPLETE         Plan:      Medications placedthis encounter:  Orders Placed This Encounter   Medications    fluconazole (DIFLUCAN) 150 MG tablet     Sig: Take 1 tablet by mouth as needed (yeast infection)     Dispense:  3 tablet     Refill:  1         Orders placedthis encounter:  Orders Placed This Encounter   Procedures    KISHOR DIGITAL SCREEN W OR WO CAD BILATERAL     Standing Status:   Future     Standing Expiration Date:   12/20/2022    US PELVIS COMPLETE     Standing Status:   Future     Standing Expiration Date:   12/20/2022     Scheduling Instructions:       With transvaginal         Follow up:  Return in about 1 year (around 12/20/2022) for Annual, [address] ( Pelvic ), Results Review. Repeat Annual GYN exam every 1 year. Cervical Cytology exam starts age 24. If Negative Cytology;  follow-up screening per current guidelines. Mammograms yearly starting at age 36. Calcium and Vitamin D dosing reviewed ( age appropriate ). Colonoscopy and bone density screening discussed ( age appropriate ). Birth control and STD prevention discussed ( age appropriate ). Gardisil counseling completed for all patients ( age appropriate ). Routine health maintenance ( per PCP and guidelines ).

## 2021-12-22 ENCOUNTER — HOSPITAL ENCOUNTER (OUTPATIENT)
Dept: ULTRASOUND IMAGING | Age: 76
Discharge: HOME | End: 2021-12-24
Payer: MEDICARE

## 2021-12-22 DIAGNOSIS — Z78.0 POSTMENOPAUSAL: ICD-10-CM

## 2021-12-22 DIAGNOSIS — N93.9 VAGINAL SPOTTING: ICD-10-CM

## 2021-12-22 PROCEDURE — 76856 US EXAM PELVIC COMPLETE: CPT

## 2021-12-22 PROCEDURE — 76830 TRANSVAGINAL US NON-OB: CPT

## 2022-02-01 ENCOUNTER — OFFICE VISIT (OUTPATIENT)
Dept: OBGYN CLINIC | Age: 77
End: 2022-02-01
Payer: MEDICARE

## 2022-02-01 VITALS
WEIGHT: 189 LBS | BODY MASS INDEX: 33.49 KG/M2 | DIASTOLIC BLOOD PRESSURE: 70 MMHG | HEIGHT: 63 IN | SYSTOLIC BLOOD PRESSURE: 130 MMHG

## 2022-02-01 DIAGNOSIS — Z09 FOLLOW UP: Primary | ICD-10-CM

## 2022-02-01 DIAGNOSIS — Z78.0 POSTMENOPAUSAL: ICD-10-CM

## 2022-02-01 DIAGNOSIS — N93.9 VAGINAL SPOTTING: ICD-10-CM

## 2022-02-01 PROCEDURE — 3017F COLORECTAL CA SCREEN DOC REV: CPT | Performed by: OBSTETRICS & GYNECOLOGY

## 2022-02-01 PROCEDURE — G8427 DOCREV CUR MEDS BY ELIG CLIN: HCPCS | Performed by: OBSTETRICS & GYNECOLOGY

## 2022-02-01 PROCEDURE — G8399 PT W/DXA RESULTS DOCUMENT: HCPCS | Performed by: OBSTETRICS & GYNECOLOGY

## 2022-02-01 PROCEDURE — G8484 FLU IMMUNIZE NO ADMIN: HCPCS | Performed by: OBSTETRICS & GYNECOLOGY

## 2022-02-01 PROCEDURE — G8417 CALC BMI ABV UP PARAM F/U: HCPCS | Performed by: OBSTETRICS & GYNECOLOGY

## 2022-02-01 PROCEDURE — 4040F PNEUMOC VAC/ADMIN/RCVD: CPT | Performed by: OBSTETRICS & GYNECOLOGY

## 2022-02-01 PROCEDURE — 1123F ACP DISCUSS/DSCN MKR DOCD: CPT | Performed by: OBSTETRICS & GYNECOLOGY

## 2022-02-01 PROCEDURE — 1036F TOBACCO NON-USER: CPT | Performed by: OBSTETRICS & GYNECOLOGY

## 2022-02-01 PROCEDURE — 1090F PRES/ABSN URINE INCON ASSESS: CPT | Performed by: OBSTETRICS & GYNECOLOGY

## 2022-02-01 PROCEDURE — 99212 OFFICE O/P EST SF 10 MIN: CPT | Performed by: OBSTETRICS & GYNECOLOGY

## 2022-02-01 ASSESSMENT — ENCOUNTER SYMPTOMS
VOMITING: 0
ABDOMINAL DISTENTION: 0
BLOOD IN STOOL: 0
ALLERGIC/IMMUNOLOGIC NEGATIVE: 1
DIARRHEA: 0
CONSTIPATION: 0
EYES NEGATIVE: 1
NAUSEA: 0
RECTAL PAIN: 0
ABDOMINAL PAIN: 0
RESPIRATORY NEGATIVE: 1
ANAL BLEEDING: 0

## 2022-02-01 NOTE — PROGRESS NOTES
Patient here for US results for spotting ( probable urinary; see last note ). US as follows:    EXAMINATION: US NON OB TRANSVAGINAL, US PELVIS COMPLETE       DATE AND TIME:12/22/2021 8:14 AM       CLINICAL HISTORY: Pelvic pain  N93.9 Vaginal spotting ICD10        COMPARISON: None       TECHNIQUE:Transabdominal and transvaginal. Transvaginal scans provided for more accurate assessment of the uterus and adnexa.       FINDINGS:       Uterus: 12.4 x 3.5 x 2.1 cm. 2 cm midcavity stripe is measured on the transabdominal scans. Transvaginal scans performed but quality limited due to incomplete bladder emptying. Endometrial assessment on the transvaginal scans was nondiagnostic.        The right ovary measures 2.3 cm.       The left ovary measures 1.9 cm.       Arterial flow is visualized in both ovaries.              Other findings:None           Impression   ENDOMETRIAL STRIPE APPEARS GROSSLY NORMAL ON THE TRANSABDOMINAL SCANS HOWEVER ACCURATE ASSESSMENT USING TRANSVAGINAL SCANNING WAS NOT ABLE TO BE PERFORMED DUE TO INCOMPLETE BLADDER FILLING. IF CLINICAL CONCERNS PERSIST CONSIDER REPEAT    TRANSVAGINAL SCANNING FOR MORE ACCURATE DIAGNOSTIC EVALUATION OF THE ENDOMETRIUM. OTHERWISE TRANSABDOMINAL SCANS OF THE PELVIS GROSSLY NORMAL.                US reads endometrial ECHO on TA scan 2 cm, but impression grossly normal.  Called radiology to confirm measurement and ECHO confirmed to be 0.2 cm ( 2 mm ) which would be WNL. Discussed and will F/U 6 months to confirm no change. Suspect spotting urinary and following with urology. All questions answered. F/U as directed. Pt was seen with total face to face time of 10 minutes with more than 50% of the visit being counseling and education regarding encounter dx of spotting. See discussion /counseling details as stated above.           Vitals:  /70   Ht 5' 3\" (1.6 m)   Wt 189 lb (85.7 kg)   LMP 04/25/1999   BMI 33.48 kg/m²   Past Medical History:   Diagnosis Date    Arthritis     both knees    Colon polyps     Glaucoma 2017    Meningitis     PONV (postoperative nausea and vomiting)     Rotator cuff injury     Sciatica      Past Surgical History:   Procedure Laterality Date    CARPAL TUNNEL RELEASE Left 2000s     [address]    CHOLECYSTECTOMY      COLONOSCOPY      COLONOSCOPY  2018    polyp removal    ENDOSCOPY, COLON, DIAGNOSTIC      EYE SURGERY  2000s    phaco with IOL OU    EYE SURGERY      Lasik OU    TONSILLECTOMY AND ADENOIDECTOMY      as child    TOTAL KNEE ARTHROPLASTY Right 2019    RIGHT TOTAL KNEE ARTHROPLASTY, SUPINE, MARY PSI performed by Colonel Kristal MD at [address] Bilateral     right  / left      Allergies:  Phenergan [promethazine hcl]  Current Outpatient Medications   Medication Sig Dispense Refill    fluconazole (DIFLUCAN) 150 MG tablet Take 1 tablet by mouth as needed (yeast infection) 3 tablet 1    CRANBERRY PO Take by mouth      Multiple Vitamins-Minerals (MULTIVITAMIN ADULTS PO) Take by mouth      acetaminophen (TYLENOL) 500 MG tablet Take 500 mg by mouth every 6 hours as needed for Pain      metoprolol succinate (TOPROL XL) 25 MG extended release tablet TAKE 1/2 TABLET BY MOUTH DAILY WITH SUPPER  1    calcium-vitamin D (OSCAL-500) 500-200 MG-UNIT per tablet Take 1 tablet by mouth daily      vitamin C (ASCORBIC ACID) 500 MG tablet Take 1,000 mg by mouth daily      LUMIGAN 0.01 % SOLN ophthalmic drops Place 1 drop into both eyes nightly       Probiotic Product (PROBIOTIC PO) Take by mouth       No current facility-administered medications for this visit.      Social History     Socioeconomic History    Marital status:      Spouse name: Not on file    Number of children: Not on file    Years of education: Not on file    Highest education level: Not on file   Occupational History    Not on file   Tobacco Use    Smoking status: Never Smoker    Smokeless tobacco: Never Used   Substance and Sexual Activity    Alcohol use: Yes     Comment: occ    Drug use: No    Sexual activity: Yes     Partners: Male   Other Topics Concern    Not on file   Social History Narrative    Not on file     Social Determinants of Health     Financial Resource Strain:     Difficulty of Paying Living Expenses: Not on file   Food Insecurity:     Worried About Running Out of Food in the Last Year: Not on file    Haylee of Food in the Last Year: Not on file   Transportation Needs:     Lack of Transportation (Medical): Not on file    Lack of Transportation (Non-Medical):  Not on file   Physical Activity:     Days of Exercise per Week: Not on file    Minutes of Exercise per Session: Not on file   Stress:     Feeling of Stress : Not on file   Social Connections:     Frequency of Communication with Friends and Family: Not on file    Frequency of Social Gatherings with Friends and Family: Not on file    Attends [employer] Services: Not on file    Active Member of [address] [place] [address] or Organizations: Not on file    Attends Club or Organization Meetings: Not on file    Marital Status: Not on file   Intimate Partner Violence:     Fear of Current or Ex-Partner: Not on file    Emotionally Abused: Not on file    Physically Abused: Not on file    Sexually Abused: Not on file   Housing Stability:     Unable to Pay for Housing in the Last Year: Not on file    Number of [place] in the Last Year: Not on file    Unstable Housing in the Last Year: Not on file        Family History   Problem Relation Age of Onset    COPD Mother     Coronary Art Dis Mother     COPD Father     Dementia Father     Other Father         series of pneumonia     No Known Problems Sister     Heart Defect Maternal Grandmother         enlarged heart     Hearing Loss Maternal Grandmother     Hearing Loss Maternal Grandfather     Heart Attack Paternal Grandmother     Coronary Art Dis Paternal Grandmother  Colon Cancer Paternal Grandfather     No Known Problems Other     Other Daughter         migraines    Arthritis Daughter         s/p LTHR    No Known Problems Son     Breast Cancer Neg Hx     Cancer Neg Hx     Diabetes Neg Hx     Eclampsia Neg Hx     Hypertension Neg Hx     Ovarian Cancer Neg Hx      Labor Neg Hx     Spont Abortions Neg Hx     Stroke Neg Hx        Review of Systems   Constitutional: Negative. Negative for activity change, appetite change, chills, diaphoresis, fatigue, fever and unexpected weight change. HENT: Negative. Eyes: Negative. Respiratory: Negative. Cardiovascular: Negative. Gastrointestinal: Negative for abdominal distention, abdominal pain, anal bleeding, blood in stool, constipation, diarrhea, nausea, rectal pain and vomiting. Endocrine: Negative. Genitourinary: Negative for decreased urine volume, difficulty urinating, dyspareunia, dysuria, enuresis, flank pain, frequency, genital sores, hematuria, menstrual problem, pelvic pain, urgency, vaginal bleeding, vaginal discharge and vaginal pain. Musculoskeletal: Negative. Skin: Negative. Allergic/Immunologic: Negative. Neurological: Negative. Hematological: Negative. Psychiatric/Behavioral: Negative. Objective:     Physical Exam  Constitutional:       General: She is not in acute distress. Appearance: She is well-developed. She is not diaphoretic. HENT:      Head: Normocephalic and atraumatic. Eyes:      Conjunctiva/sclera: Conjunctivae normal.   Cardiovascular:      Rate and Rhythm: Normal rate and regular rhythm. Pulmonary:      Effort: Pulmonary effort is normal. No respiratory distress. Musculoskeletal:         General: No tenderness or deformity. Normal range of motion. Cervical back: Normal range of motion and neck supple. Skin:     General: Skin is warm and dry. Coloration: Skin is not pale.    Neurological:      Mental Status: She is alert and oriented to person, place, and time. Motor: No abnormal muscle tone. Coordination: Coordination normal.   Psychiatric:         Behavior: Behavior normal.         Thought Content: Thought content normal.         Judgment: Judgment normal.         Assessment:          Diagnosis Orders   1. Follow up     2. Vaginal spotting     3. Postmenopausal          Plan:      Medications placedthis encounter:  No orders of the defined types were placed in this encounter. Orders placedthis encounter:  No orders of the defined types were placed in this encounter. Follow up:  Return in about 6 months (around 8/1/2022) for US ( Pelvic ), Results Review.

## 2022-03-01 ENCOUNTER — HOSPITAL ENCOUNTER (OUTPATIENT)
Dept: WOMENS IMAGING | Age: 77
Discharge: HOME | End: 2022-03-03
Payer: MEDICARE

## 2022-03-01 VITALS — HEIGHT: 63 IN | BODY MASS INDEX: 33.48 KG/M2

## 2022-03-01 DIAGNOSIS — Z12.31 SCREENING MAMMOGRAM, ENCOUNTER FOR: ICD-10-CM

## 2022-03-01 PROCEDURE — 77063 BREAST TOMOSYNTHESIS BI: CPT

## 2022-07-25 ENCOUNTER — HOSPITAL ENCOUNTER (OUTPATIENT)
Dept: ULTRASOUND IMAGING | Age: 77
Discharge: HOME | End: 2022-07-27
Payer: MEDICARE

## 2022-07-25 DIAGNOSIS — Z78.0 POSTMENOPAUSAL: ICD-10-CM

## 2022-07-25 DIAGNOSIS — N93.9 VAGINAL SPOTTING: ICD-10-CM

## 2022-07-25 PROCEDURE — 76856 US EXAM PELVIC COMPLETE: CPT

## 2022-07-25 PROCEDURE — 76830 TRANSVAGINAL US NON-OB: CPT

## 2022-07-27 ENCOUNTER — OFFICE VISIT (OUTPATIENT)
Dept: OBGYN CLINIC | Age: 77
End: 2022-07-27
Payer: MEDICARE

## 2022-07-27 VITALS
WEIGHT: 188 LBS | HEIGHT: 63 IN | DIASTOLIC BLOOD PRESSURE: 76 MMHG | BODY MASS INDEX: 33.31 KG/M2 | SYSTOLIC BLOOD PRESSURE: 130 MMHG

## 2022-07-27 DIAGNOSIS — Z78.0 POSTMENOPAUSAL: ICD-10-CM

## 2022-07-27 DIAGNOSIS — N93.9 VAGINAL SPOTTING: Primary | ICD-10-CM

## 2022-07-27 PROCEDURE — 1036F TOBACCO NON-USER: CPT | Performed by: OBSTETRICS & GYNECOLOGY

## 2022-07-27 PROCEDURE — 1090F PRES/ABSN URINE INCON ASSESS: CPT | Performed by: OBSTETRICS & GYNECOLOGY

## 2022-07-27 PROCEDURE — G8427 DOCREV CUR MEDS BY ELIG CLIN: HCPCS | Performed by: OBSTETRICS & GYNECOLOGY

## 2022-07-27 PROCEDURE — G8417 CALC BMI ABV UP PARAM F/U: HCPCS | Performed by: OBSTETRICS & GYNECOLOGY

## 2022-07-27 PROCEDURE — 99212 OFFICE O/P EST SF 10 MIN: CPT | Performed by: OBSTETRICS & GYNECOLOGY

## 2022-07-27 PROCEDURE — 1123F ACP DISCUSS/DSCN MKR DOCD: CPT | Performed by: OBSTETRICS & GYNECOLOGY

## 2022-07-27 PROCEDURE — G8399 PT W/DXA RESULTS DOCUMENT: HCPCS | Performed by: OBSTETRICS & GYNECOLOGY

## 2022-07-27 RX ORDER — PAROXETINE 10 MG/1
TABLET, FILM COATED ORAL
COMMUNITY
Start: 2022-07-22 | End: 2022-08-03

## 2022-07-27 ASSESSMENT — ENCOUNTER SYMPTOMS
BLOOD IN STOOL: 0
NAUSEA: 0
CONSTIPATION: 0
VOMITING: 0
RECTAL PAIN: 0
ANAL BLEEDING: 0
ALLERGIC/IMMUNOLOGIC NEGATIVE: 1
RESPIRATORY NEGATIVE: 1
EYES NEGATIVE: 1
ABDOMINAL DISTENTION: 0
ABDOMINAL PAIN: 0
DIARRHEA: 0

## 2022-07-27 ASSESSMENT — PATIENT HEALTH QUESTIONNAIRE - PHQ9
SUM OF ALL RESPONSES TO PHQ QUESTIONS 1-9: 0
2. FEELING DOWN, DEPRESSED OR HOPELESS: 0
1. LITTLE INTEREST OR PLEASURE IN DOING THINGS: 0
SUM OF ALL RESPONSES TO PHQ9 QUESTIONS 1 & 2: 0
SUM OF ALL RESPONSES TO PHQ QUESTIONS 1-9: 0

## 2022-07-27 NOTE — PATIENT INSTRUCTIONS
EMBX---      You have been scheduled for an Endometrial Biopsy (EMBX). An EMBX is a procedure that involves using a soft, straw-like device to suction a small sample of lining from the uterus. An EMBX is done to find the cause of heavy, prolonged, or irregular uterine bleeding. It is also done to find the cause of uterine bleeding in women who have gone through menopause. Do I need anything prior to my EMBX? 1. No unprotected intercourse for 3-4 weeks prior to procedure  2. Take 800mg of Motrin 1 hour prior to your procedure  3. If you start your period you can still have the procedure if you are having light bleeding such as the first or last day of your cycle. What can I expect when I go home? 1. You may have pink or red tinged discharge after the procedure for up to 24 hours. 2.  This procedure may cause your period to start. 3.  If you have heavy bleeding (filling an overnight pad in 1 hour or less for 3 hrs), a fever of 100 degrees (or higher), or abdominal pain that is debilitating, please call the office immediately at [phone].

## 2022-07-27 NOTE — PROGRESS NOTES
Patient here for US results to recheck endometrial ECHO ( see previous note ). US as follows:        Comparison: December 22, 2023       Diagnosis: N93.9 Vaginal spotting ICD10           Technique: US NON OB TRANSVAGINAL, US PELVIS COMPLETE       Findings: The uterus measures 11.0 x 3.6 x 2.4 cm. .  The endometrial cavity measures 0.6 cm. A trace of free fluid is seen. .. The right and left ovaries are not visualized. No adnexal masses are seen. The visualized portion of the bladder is unremarkable. .               Impression   Impression: The endometrial cavity is is slightly enlarged with the upper limits of normal being 5 mm. Discussed slightly thicker endometrium at 5mm ( upper limits of normal ). Discussed proceeding with EMB to confirm no endometrial pathology. Patient agrees and will F/U for EMB. All questions answered. Pt was seen with total face to face time of 10 minutes with more than 50% of the visit being counseling and education regarding encounter dx of slightly thickened endometrium. .  See discussion /counseling details as stated above.         Vitals:  /76   Ht 5' 3\" (1.6 m)   Wt 188 lb (85.3 kg)   LMP 04/25/1999   BMI 33.30 kg/m²   Past Medical History:   Diagnosis Date    Anxiety     Arthritis     both knees    Colon polyps     Glaucoma 2017    Meningitis     PONV (postoperative nausea and vomiting)     Rotator cuff injury     Sciatica      Past Surgical History:   Procedure Laterality Date    CARPAL TUNNEL RELEASE Left 2000s [address]    CHOLECYSTECTOMY  2011    COLONOSCOPY      COLONOSCOPY  09/24/2018    polyp removal    ENDOSCOPY, COLON, DIAGNOSTIC      EYE SURGERY  2000s    phaco with IOL OU    EYE SURGERY      Lasik OU    TONSILLECTOMY AND ADENOIDECTOMY      as child    TOTAL KNEE ARTHROPLASTY Right 5/7/2019    RIGHT TOTAL KNEE ARTHROPLASTY, SUPINE, MARY PSI performed by Alvin Spann MD at [address] [place] [address] EXCISION Bilateral     right 2005 / left 1970     Allergies:  Phenergan [promethazine hcl]  Current Outpatient Medications   Medication Sig Dispense Refill    PARoxetine (PAXIL) 10 MG tablet PLEASE TAKE ONLY HALF TABLET WITH SUPPER EVERY EVENING. THANK YOU.      fluconazole (DIFLUCAN) 150 MG tablet Take 1 tablet by mouth as needed (yeast infection) 3 tablet 1    CRANBERRY PO Take by mouth      Multiple Vitamins-Minerals (MULTIVITAMIN ADULTS PO) Take by mouth      acetaminophen (TYLENOL) 500 MG tablet Take 500 mg by mouth every 6 hours as needed for Pain      metoprolol succinate (TOPROL XL) 25 MG extended release tablet TAKE 1/2 TABLET BY MOUTH DAILY WITH SUPPER  1    calcium-vitamin D (OSCAL-500) 500-200 MG-UNIT per tablet Take 1 tablet by mouth daily      vitamin C (ASCORBIC ACID) 500 MG tablet Take 1,000 mg by mouth daily      LUMIGAN 0.01 % SOLN ophthalmic drops Place 1 drop into both eyes nightly       Probiotic Product (PROBIOTIC PO) Take by mouth       No current facility-administered medications for this visit.      Social History     Socioeconomic History    Marital status:      Spouse name: Not on file    Number of children: Not on file    Years of education: Not on file    Highest education level: Not on file   Occupational History    Not on file   Tobacco Use    Smoking status: Never    Smokeless tobacco: Never   Substance and Sexual Activity    Alcohol use: Yes     Comment: occ    Drug use: No    Sexual activity: Yes     Partners: Male   Other Topics Concern    Not on file   Social History Narrative    Not on file     Social Determinants of Health     Financial Resource Strain: Not on file   Food Insecurity: Not on file   Transportation Needs: Not on file   Physical Activity: Not on file   Stress: Not on file   Social Connections: Not on file   Intimate Partner Violence: Not on file   Housing Stability: Not on file        Family History   Problem Relation Age of Onset    COPD Mother     Coronary Art Dis Mother     COPD Father     Dementia Father     Other Father         series of pneumonia     No Known Problems Sister     Heart Defect Maternal Grandmother         enlarged heart     Hearing Loss Maternal Grandmother     Hearing Loss Maternal Grandfather     Heart Attack Paternal Grandmother     Coronary Art Dis Paternal Grandmother     Colon Cancer Paternal Grandfather     No Known Problems Other     Other Daughter         migraines    Arthritis Daughter         s/p LTHR    No Known Problems Son     Breast Cancer Neg Hx     Cancer Neg Hx     Diabetes Neg Hx     Eclampsia Neg Hx     Hypertension Neg Hx     Ovarian Cancer Neg Hx      Labor Neg Hx     Spont Abortions Neg Hx     Stroke Neg Hx        Review of Systems   Constitutional: Negative. Negative for activity change, appetite change, chills, diaphoresis, fatigue, fever and unexpected weight change. HENT: Negative. Eyes: Negative. Respiratory: Negative. Cardiovascular: Negative. Gastrointestinal:  Negative for abdominal distention, abdominal pain, anal bleeding, blood in stool, constipation, diarrhea, nausea, rectal pain and vomiting. Endocrine: Negative. Genitourinary:  Negative for decreased urine volume, difficulty urinating, dyspareunia, dysuria, enuresis, flank pain, frequency, genital sores, hematuria, menstrual problem, pelvic pain, urgency, vaginal bleeding, vaginal discharge and vaginal pain. Musculoskeletal: Negative. Skin: Negative. Allergic/Immunologic: Negative. Neurological: Negative. Hematological: Negative. Psychiatric/Behavioral: Negative. Objective:     Physical Exam  Constitutional:       General: She is not in acute distress. Appearance: She is well-developed. She is not diaphoretic. HENT:      Head: Normocephalic and atraumatic. Eyes:      Conjunctiva/sclera: Conjunctivae normal.   Cardiovascular:      Rate and Rhythm: Normal rate and regular rhythm.    Pulmonary:      Effort: Pulmonary effort is normal. No respiratory distress. Musculoskeletal:         General: No tenderness or deformity. Normal range of motion. Cervical back: Normal range of motion and neck supple. Skin:     General: Skin is warm and dry. Coloration: Skin is not pale. Neurological:      Mental Status: She is alert and oriented to person, place, and time. Motor: No abnormal muscle tone. Coordination: Coordination normal.   Psychiatric:         Behavior: Behavior normal.         Thought Content: Thought content normal.         Judgment: Judgment normal.       Assessment:          Diagnosis Orders   1. Vaginal spotting        2. Postmenopausal             Plan:      Medications placedthis encounter:  No orders of the defined types were placed in this encounter. Orders placedthis encounter:  No orders of the defined types were placed in this encounter. Follow up:  Return for Endometrial Biopsy.

## 2022-08-03 ENCOUNTER — OFFICE VISIT (OUTPATIENT)
Dept: OBGYN CLINIC | Age: 77
End: 2022-08-03
Payer: MEDICARE

## 2022-08-03 VITALS
SYSTOLIC BLOOD PRESSURE: 138 MMHG | BODY MASS INDEX: 33.66 KG/M2 | WEIGHT: 190 LBS | DIASTOLIC BLOOD PRESSURE: 62 MMHG | HEIGHT: 63 IN

## 2022-08-03 DIAGNOSIS — N93.9 VAGINAL SPOTTING: Primary | ICD-10-CM

## 2022-08-03 DIAGNOSIS — Z78.0 POSTMENOPAUSAL: ICD-10-CM

## 2022-08-03 PROCEDURE — 58100 BIOPSY OF UTERUS LINING: CPT | Performed by: OBSTETRICS & GYNECOLOGY

## 2022-08-03 ASSESSMENT — ENCOUNTER SYMPTOMS
BLOOD IN STOOL: 0
CONSTIPATION: 0
RECTAL PAIN: 0
RESPIRATORY NEGATIVE: 1
EYES NEGATIVE: 1
ANAL BLEEDING: 0
NAUSEA: 0
ABDOMINAL PAIN: 0
ALLERGIC/IMMUNOLOGIC NEGATIVE: 1
DIARRHEA: 0
VOMITING: 0
ABDOMINAL DISTENTION: 0

## 2022-08-03 NOTE — PROGRESS NOTES
500 MG tablet Take 1,000 mg by mouth daily      LUMIGAN 0.01 % SOLN ophthalmic drops Place 1 drop into both eyes nightly       Probiotic Product (PROBIOTIC PO) Take by mouth       No current facility-administered medications for this visit. Social History     Socioeconomic History    Marital status:      Spouse name: Not on file    Number of children: Not on file    Years of education: Not on file    Highest education level: Not on file   Occupational History    Not on file   Tobacco Use    Smoking status: Never    Smokeless tobacco: Never   Substance and Sexual Activity    Alcohol use: Yes     Comment: occ    Drug use: No    Sexual activity: Yes     Partners: Male   Other Topics Concern    Not on file   Social History Narrative    Not on file     Social Determinants of Health     Financial Resource Strain: Not on file   Food Insecurity: Not on file   Transportation Needs: Not on file   Physical Activity: Not on file   Stress: Not on file   Social Connections: Not on file   Intimate Partner Violence: Not on file   Housing Stability: Not on file     Family History   Problem Relation Age of Onset    COPD Mother     Coronary Art Dis Mother     COPD Father     Dementia Father     Other Father         series of pneumonia     No Known Problems Sister     Heart Defect Maternal Grandmother         enlarged heart     Hearing Loss Maternal Grandmother     Hearing Loss Maternal Grandfather     Heart Attack Paternal Grandmother     Coronary Art Dis Paternal Grandmother     Colon Cancer Paternal Grandfather     No Known Problems Other     Other Daughter         migraines    Arthritis Daughter         s/p LTHR    No Known Problems Son     Breast Cancer Neg Hx     Cancer Neg Hx     Diabetes Neg Hx     Eclampsia Neg Hx     Hypertension Neg Hx     Ovarian Cancer Neg Hx      Labor Neg Hx     Spont Abortions Neg Hx     Stroke Neg Hx        Review of Systems   Constitutional: Negative.   Negative for activity change, appetite change, chills, diaphoresis, fatigue, fever and unexpected weight change. HENT: Negative. Eyes: Negative. Respiratory: Negative. Cardiovascular: Negative. Gastrointestinal:  Negative for abdominal distention, abdominal pain, anal bleeding, blood in stool, constipation, diarrhea, nausea, rectal pain and vomiting. Endocrine: Negative. Genitourinary:  Negative for decreased urine volume, difficulty urinating, dyspareunia, dysuria, enuresis, flank pain, frequency, genital sores, hematuria, menstrual problem, pelvic pain, urgency, vaginal bleeding, vaginal discharge and vaginal pain. Musculoskeletal: Negative. Skin: Negative. Allergic/Immunologic: Negative. Neurological: Negative. Hematological: Negative. Psychiatric/Behavioral: Negative. Objective:     Physical Exam  Constitutional:       General: She is not in acute distress. Appearance: She is well-developed. She is not diaphoretic. HENT:      Head: Normocephalic and atraumatic. Eyes:      Conjunctiva/sclera: Conjunctivae normal.   Cardiovascular:      Rate and Rhythm: Normal rate and regular rhythm. Pulmonary:      Effort: Pulmonary effort is normal. No respiratory distress. Genitourinary:     Labia:         Right: No rash, tenderness, lesion or injury. Left: No rash, tenderness, lesion or injury. Vagina: Normal. No signs of injury and foreign body. No vaginal discharge, erythema, tenderness or bleeding. Comments: No abnormal discharge. No cervical or vaginal lesions. Normal external genitalia. Musculoskeletal:         General: No tenderness or deformity. Normal range of motion. Cervical back: Normal range of motion and neck supple. Skin:     General: Skin is warm and dry. Coloration: Skin is not pale. Neurological:      Mental Status: She is alert and oriented to person, place, and time. Motor: No abnormal muscle tone.       Coordination: Coordination normal. Psychiatric:         Behavior: Behavior normal.         Thought Content: Thought content normal.         Judgment: Judgment normal.       Assessment:      Diagnosis Orders   1. Vaginal spotting  WI BIOPSY OF UTERUS LINING      2. Postmenopausal  WI BIOPSY OF UTERUS LINING            Plan:      Medications placedthis encounter:  No orders of the defined types were placed in this encounter. Orders placedthis encounter:  Orders Placed This Encounter   Procedures    WI BIOPSY OF UTERUS LINING     Standing Status:   Future     Standing Expiration Date:   8/3/2023         Follow up:  Return in about 3 months (around 11/3/2022) for Results Review, Pelvic US.

## 2022-11-22 ENCOUNTER — HOSPITAL ENCOUNTER (OUTPATIENT)
Dept: ULTRASOUND IMAGING | Age: 77
Discharge: HOME | End: 2022-11-24
Payer: MEDICARE

## 2022-11-22 DIAGNOSIS — Z78.0 POSTMENOPAUSAL: ICD-10-CM

## 2022-11-22 DIAGNOSIS — N93.9 VAGINAL SPOTTING: ICD-10-CM

## 2022-11-22 PROCEDURE — 76830 TRANSVAGINAL US NON-OB: CPT

## 2022-11-22 PROCEDURE — 76856 US EXAM PELVIC COMPLETE: CPT

## 2022-12-21 ENCOUNTER — OFFICE VISIT (OUTPATIENT)
Dept: OBGYN CLINIC | Age: 77
End: 2022-12-21
Payer: MEDICARE

## 2022-12-21 VITALS
SYSTOLIC BLOOD PRESSURE: 120 MMHG | DIASTOLIC BLOOD PRESSURE: 72 MMHG | WEIGHT: 190 LBS | HEIGHT: 63 IN | BODY MASS INDEX: 33.66 KG/M2

## 2022-12-21 DIAGNOSIS — Z13.820 OSTEOPOROSIS SCREENING: ICD-10-CM

## 2022-12-21 DIAGNOSIS — Z01.419 ENCOUNTER FOR WELL WOMAN EXAM WITH ROUTINE GYNECOLOGICAL EXAM: Primary | ICD-10-CM

## 2022-12-21 DIAGNOSIS — Z12.31 SCREENING MAMMOGRAM FOR BREAST CANCER: ICD-10-CM

## 2022-12-21 DIAGNOSIS — Z78.0 POSTMENOPAUSAL: ICD-10-CM

## 2022-12-21 PROCEDURE — G0101 CA SCREEN;PELVIC/BREAST EXAM: HCPCS | Performed by: OBSTETRICS & GYNECOLOGY

## 2022-12-21 PROCEDURE — G8484 FLU IMMUNIZE NO ADMIN: HCPCS | Performed by: OBSTETRICS & GYNECOLOGY

## 2022-12-21 ASSESSMENT — ENCOUNTER SYMPTOMS
EYES NEGATIVE: 1
RECTAL PAIN: 0
ABDOMINAL DISTENTION: 0
BLOOD IN STOOL: 0
ALLERGIC/IMMUNOLOGIC NEGATIVE: 1
CONSTIPATION: 0
NAUSEA: 0
VOMITING: 0
DIARRHEA: 0
ANAL BLEEDING: 0
ABDOMINAL PAIN: 0
RESPIRATORY NEGATIVE: 1

## 2022-12-21 ASSESSMENT — VISUAL ACUITY: OU: 1

## 2022-12-21 NOTE — PROGRESS NOTES
The patient was asked if she would like a chaperone present for her intimate exam. She  Declined the chaperone.  Mami Ortiz CMA ([address] [place] [address])

## 2022-12-21 NOTE — PROGRESS NOTES
Subjective:      Patient ID: Romel Carreno is a 69 y.o. female    Annual exam.  Reviewed medical, surgical, social and family history. Also reviewed current medications and allergies. No PMB. No GYN complaints. Pap deferred. Screening mammogram ordered. Monthly SBE encouraged. Dexa scan ordered per protocol. Screening colonoscopy recommended per routine. Reviewed repeat US for slightly thickened endometrium negative. No PMB. F/U annual exam or prn.     Vitals:  /72   Ht 5' 3\" (1.6 m)   Wt 190 lb (86.2 kg)   LMP 04/25/1999   BMI 33.66 kg/m²   Past Medical History:   Diagnosis Date    Anxiety     Arthritis     both knees    Colon polyps     Glaucoma 2017    Meningitis     PONV (postoperative nausea and vomiting)     Rotator cuff injury     Sciatica      Past Surgical History:   Procedure Laterality Date    CARPAL TUNNEL RELEASE Left 2000s [address]    CHOLECYSTECTOMY  2011    COLONOSCOPY      COLONOSCOPY  09/24/2018    polyp removal    ENDOSCOPY, COLON, DIAGNOSTIC      EYE SURGERY  2000s    phaco with IOL OU    EYE SURGERY      Lasik OU    TONSILLECTOMY AND ADENOIDECTOMY      as child    TOTAL KNEE ARTHROPLASTY Right 5/7/2019    RIGHT TOTAL KNEE ARTHROPLASTY, SUPINE, MARY PSI performed by Manohar Crum MD at [address] [place] [address] Bilateral     right 2005 / left 1970     Allergies:  Phenergan [promethazine hcl]  Current Outpatient Medications   Medication Sig Dispense Refill    CRANBERRY PO Take by mouth      Multiple Vitamins-Minerals (MULTIVITAMIN ADULTS PO) Take by mouth      acetaminophen (TYLENOL) 500 MG tablet Take 500 mg by mouth every 6 hours as needed for Pain      calcium-vitamin D (OSCAL-500) 500-200 MG-UNIT per tablet Take 1 tablet by mouth daily      vitamin C (ASCORBIC ACID) 500 MG tablet Take 1,000 mg by mouth daily      LUMIGAN 0.01 % SOLN ophthalmic drops Place 1 drop into both eyes nightly       Probiotic Product (PROBIOTIC PO) Take by mouth       No current facility-administered medications for this visit. Social History     Socioeconomic History    Marital status:      Spouse name: Not on file    Number of children: Not on file    Years of education: Not on file    Highest education level: Not on file   Occupational History    Not on file   Tobacco Use    Smoking status: Never    Smokeless tobacco: Never   Substance and Sexual Activity    Alcohol use: Yes     Comment: occ    Drug use: No    Sexual activity: Yes     Partners: Male   Other Topics Concern    Not on file   Social History Narrative    Not on file     Social Determinants of Health     Financial Resource Strain: Not on file   Food Insecurity: Not on file   Transportation Needs: Not on file   Physical Activity: Not on file   Stress: Not on file   Social Connections: Not on file   Intimate Partner Violence: Not on file   Housing Stability: Not on file     Family History   Problem Relation Age of Onset    COPD Mother     Coronary Art Dis Mother     COPD Father     Dementia Father     Other Father         series of pneumonia     No Known Problems Sister     Heart Defect Maternal Grandmother         enlarged heart     Hearing Loss Maternal Grandmother     Hearing Loss Maternal Grandfather     Heart Attack Paternal Grandmother     Coronary Art Dis Paternal Grandmother     Colon Cancer Paternal Grandfather     No Known Problems Other     Other Daughter         migraines    Arthritis Daughter         s/p LTHR    No Known Problems Son     Breast Cancer Neg Hx     Cancer Neg Hx     Diabetes Neg Hx     Eclampsia Neg Hx     Hypertension Neg Hx     Ovarian Cancer Neg Hx      Labor Neg Hx     Spont Abortions Neg Hx     Stroke Neg Hx        Review of Systems   Constitutional: Negative. Negative for activity change, appetite change, chills, diaphoresis, fatigue, fever and unexpected weight change. HENT: Negative. Eyes: Negative. Respiratory: Negative. Cardiovascular: Negative. Gastrointestinal:  Negative for abdominal distention, abdominal pain, anal bleeding, blood in stool, constipation, diarrhea, nausea, rectal pain and vomiting. Endocrine: Negative. Genitourinary:  Negative for decreased urine volume, difficulty urinating, dyspareunia, dysuria, enuresis, flank pain, frequency, genital sores, hematuria, menstrual problem, pelvic pain, urgency, vaginal bleeding, vaginal discharge and vaginal pain. Musculoskeletal: Negative. Skin: Negative. Allergic/Immunologic: Negative. Neurological: Negative. Hematological: Negative. Psychiatric/Behavioral: Negative. Objective:     Physical Exam  Constitutional:       Appearance: She is well-developed. HENT:      Head: Normocephalic. Eyes:      General: Lids are normal. Vision grossly intact. Neck:      Thyroid: No thyromegaly. Cardiovascular:      Rate and Rhythm: Normal rate and regular rhythm. Heart sounds: Normal heart sounds. Pulmonary:      Effort: Pulmonary effort is normal. No respiratory distress. Breath sounds: Normal breath sounds. No wheezing or rales. Chest:      Chest wall: No tenderness. Breasts:     Right: Normal. No swelling, bleeding, inverted nipple, mass, nipple discharge, skin change or tenderness. Left: Normal. No swelling, bleeding, inverted nipple, mass, nipple discharge, skin change or tenderness. Abdominal:      General: There is no distension. Palpations: Abdomen is soft. There is no mass. Tenderness: There is no abdominal tenderness. There is no guarding or rebound. Hernia: No hernia is present. There is no hernia in the left inguinal area or right inguinal area. Genitourinary:     General: Normal vulva. Pubic Area: No rash. Labia:         Right: No rash, tenderness, lesion or injury. Left: No rash, tenderness, lesion or injury. Urethra: No prolapse, urethral swelling or urethral lesion. Vagina: Normal. No signs of injury and foreign body. No vaginal discharge, erythema, tenderness or bleeding. Cervix: No cervical motion tenderness, discharge or friability. Uterus: Not deviated, not enlarged, not fixed and not tender. Adnexa:         Right: No mass, tenderness or fullness. Left: No mass, tenderness or fullness. Rectum: Normal.   Musculoskeletal:         General: No tenderness. Normal range of motion. Cervical back: Normal range of motion and neck supple. Lymphadenopathy:      Cervical: No cervical adenopathy. Upper Body:      Right upper body: No supraclavicular or axillary adenopathy. Left upper body: No supraclavicular or axillary adenopathy. Lower Body: No right inguinal adenopathy. No left inguinal adenopathy. Skin:     General: Skin is warm and dry. Coloration: Skin is not pale. Findings: No erythema or rash. Neurological:      Mental Status: She is alert and oriented to person, place, and time. Psychiatric:         Behavior: Behavior normal.         Thought Content: Thought content normal.         Judgment: Judgment normal.       Assessment:      Diagnosis Orders   1. Encounter for well woman exam with routine gynecological exam        2. Screening mammogram for breast cancer  KISHOR DIGITAL SCREEN W OR WO CAD BILATERAL      3. Osteoporosis screening  DEXA BONE DENSITY AXIAL SKELETON      4. Postmenopausal  DEXA BONE DENSITY AXIAL SKELETON            Plan:      Medications placedthis encounter:  No orders of the defined types were placed in this encounter.         Orders placedthis encounter:  Orders Placed This Encounter   Procedures    KISHOR DIGITAL SCREEN W OR WO CAD BILATERAL     Standing Status:   Future     Standing Expiration Date:   12/21/2023    DEXA BONE DENSITY AXIAL SKELETON     Standing Status:   Future     Standing Expiration Date:   12/21/2023         Follow up:  Return in about 1 year (around 12/21/2023) for Annual.  Repeat Annual GYN exam every 1 year. Cervical Cytology exam starts age 24. If Negative Cytology;  follow-up screening per current guidelines. Mammograms yearly starting at age 36. Calcium and Vitamin D dosing reviewed ( age appropriate ). Colonoscopy and bone density screening discussed ( age appropriate ). Birth control and STD prevention discussed ( age appropriate ). Gardisil counseling completed for all patients ( age appropriate ). Routine health maintenance ( per PCP and guidelines ).

## 2023-02-04 PROBLEM — R06.2 WHEEZING: Status: ACTIVE | Noted: 2023-02-04

## 2023-02-04 PROBLEM — M54.6 ACUTE RIGHT-SIDED THORACIC BACK PAIN: Status: ACTIVE | Noted: 2023-02-04

## 2023-02-04 PROBLEM — B02.9 SHINGLES RASH: Status: ACTIVE | Noted: 2023-02-04

## 2023-02-04 PROBLEM — B02.9 HERPES ZOSTER WITHOUT COMPLICATION: Status: ACTIVE | Noted: 2023-02-04

## 2023-02-04 PROBLEM — R09.82 POSTNASAL DRIP: Status: ACTIVE | Noted: 2023-02-04

## 2023-02-04 PROBLEM — M54.9 BACK PAIN: Status: ACTIVE | Noted: 2023-02-04

## 2023-02-04 PROBLEM — M54.41 ACUTE MIDLINE LOW BACK PAIN WITH BILATERAL SCIATICA: Status: ACTIVE | Noted: 2023-02-04

## 2023-02-04 PROBLEM — I10 ESSENTIAL HYPERTENSION: Status: ACTIVE | Noted: 2023-02-04

## 2023-02-04 PROBLEM — J30.9 ALLERGIC RHINITIS: Status: ACTIVE | Noted: 2023-02-04

## 2023-02-04 PROBLEM — R04.0 EPISTAXIS: Status: ACTIVE | Noted: 2023-02-04

## 2023-02-04 PROBLEM — H10.13 ALLERGIC CONJUNCTIVITIS OF BOTH EYES: Status: ACTIVE | Noted: 2023-02-04

## 2023-02-04 PROBLEM — J20.9 ACUTE BRONCHITIS: Status: ACTIVE | Noted: 2023-02-04

## 2023-02-04 PROBLEM — M79.646 THUMB PAIN: Status: ACTIVE | Noted: 2023-02-04

## 2023-02-04 PROBLEM — R19.7 DIARRHEA: Status: ACTIVE | Noted: 2023-02-04

## 2023-02-04 PROBLEM — E78.00 PURE HYPERCHOLESTEROLEMIA: Status: ACTIVE | Noted: 2023-02-04

## 2023-02-04 PROBLEM — K52.9 ACUTE GASTROENTERITIS: Status: ACTIVE | Noted: 2023-02-04

## 2023-02-04 PROBLEM — R53.83 FATIGUE: Status: ACTIVE | Noted: 2023-02-04

## 2023-02-04 PROBLEM — E66.09 NON MORBID OBESITY DUE TO EXCESS CALORIES: Status: ACTIVE | Noted: 2023-02-04

## 2023-02-04 PROBLEM — Z96.659 STATUS POST TOTAL KNEE REPLACEMENT: Status: ACTIVE | Noted: 2023-02-04

## 2023-02-04 PROBLEM — M85.80 OSTEOPENIA: Status: ACTIVE | Noted: 2023-02-04

## 2023-02-04 PROBLEM — R53.81 DEBILITY: Status: ACTIVE | Noted: 2023-02-04

## 2023-02-04 PROBLEM — Z53.20 STATIN DECLINED: Status: ACTIVE | Noted: 2023-02-04

## 2023-02-04 PROBLEM — E66.09 OBESITY DUE TO EXCESS CALORIES WITHOUT SERIOUS COMORBIDITY: Status: ACTIVE | Noted: 2023-02-04

## 2023-02-04 PROBLEM — M54.42 ACUTE MIDLINE LOW BACK PAIN WITH BILATERAL SCIATICA: Status: ACTIVE | Noted: 2023-02-04

## 2023-02-04 PROBLEM — S05.00XA CORNEAL ABRASION: Status: ACTIVE | Noted: 2023-02-04

## 2023-02-04 PROBLEM — N20.0 NEPHROLITHIASIS: Status: ACTIVE | Noted: 2023-02-04

## 2023-02-04 PROBLEM — Z86.19 HISTORY OF HERPES SIMPLEX TYPE 2 INFECTION: Status: ACTIVE | Noted: 2023-02-04

## 2023-02-04 PROBLEM — J11.1 INFLUENZA: Status: ACTIVE | Noted: 2023-02-04

## 2023-02-04 PROBLEM — F41.8 ANXIETY WITH DEPRESSION: Status: ACTIVE | Noted: 2023-02-04

## 2023-02-04 PROBLEM — R03.0 BLOOD PRESSURE ELEVATED WITHOUT HISTORY OF HTN: Status: ACTIVE | Noted: 2023-02-04

## 2023-02-04 PROBLEM — J20.8 ACUTE BRONCHITIS DUE TO COVID-19 VIRUS: Status: ACTIVE | Noted: 2023-02-04

## 2023-02-04 PROBLEM — M75.101 ROTATOR CUFF SYNDROME OF RIGHT SHOULDER: Status: ACTIVE | Noted: 2023-02-04

## 2023-02-04 PROBLEM — R30.0 DYSURIA: Status: ACTIVE | Noted: 2023-02-04

## 2023-02-04 PROBLEM — G44.209 TENSION HEADACHE: Status: ACTIVE | Noted: 2023-02-04

## 2023-02-04 PROBLEM — Z96.651 PAINFUL TOTAL KNEE REPLACEMENT, RIGHT (CMS-HCC): Status: ACTIVE | Noted: 2023-02-04

## 2023-02-04 PROBLEM — M43.6 TORTICOLLIS: Status: ACTIVE | Noted: 2023-02-04

## 2023-02-04 PROBLEM — N39.3 STRESS INCONTINENCE: Status: ACTIVE | Noted: 2023-02-04

## 2023-02-04 PROBLEM — B35.1 ONYCHOMYCOSIS OF TOENAIL: Status: ACTIVE | Noted: 2023-02-04

## 2023-02-04 PROBLEM — B96.1 KLEBSIELLA CYSTITIS: Status: ACTIVE | Noted: 2023-02-04

## 2023-02-04 PROBLEM — T84.84XA PAINFUL TOTAL KNEE REPLACEMENT, RIGHT (CMS-HCC): Status: ACTIVE | Noted: 2023-02-04

## 2023-02-04 PROBLEM — M25.561 KNEE PAIN, RIGHT: Status: ACTIVE | Noted: 2023-02-04

## 2023-02-04 PROBLEM — K21.9 GERD (GASTROESOPHAGEAL REFLUX DISEASE): Status: ACTIVE | Noted: 2023-02-04

## 2023-02-04 PROBLEM — M25.562 KNEE PAIN, LEFT: Status: ACTIVE | Noted: 2023-02-04

## 2023-02-04 PROBLEM — N30.90 KLEBSIELLA CYSTITIS: Status: ACTIVE | Noted: 2023-02-04

## 2023-02-04 PROBLEM — M35.3 POLYMYALGIA (MULTI): Status: ACTIVE | Noted: 2023-02-04

## 2023-02-04 PROBLEM — E55.9 VITAMIN D DEFICIENCY: Status: ACTIVE | Noted: 2023-02-04

## 2023-02-04 PROBLEM — M17.11 PRIMARY OSTEOARTHRITIS OF RIGHT KNEE: Status: ACTIVE | Noted: 2023-02-04

## 2023-02-04 PROBLEM — K63.5 COLON POLYP: Status: ACTIVE | Noted: 2023-02-04

## 2023-02-04 PROBLEM — S39.012A LOW BACK STRAIN: Status: ACTIVE | Noted: 2023-02-04

## 2023-02-04 PROBLEM — E86.0 DEHYDRATION: Status: ACTIVE | Noted: 2023-02-04

## 2023-02-04 PROBLEM — F51.02 ADJUSTMENT INSOMNIA: Status: ACTIVE | Noted: 2023-02-04

## 2023-02-04 PROBLEM — U07.1 ACUTE BRONCHITIS DUE TO COVID-19 VIRUS: Status: ACTIVE | Noted: 2023-02-04

## 2023-02-04 PROBLEM — M25.552 HIP PAIN, LEFT: Status: ACTIVE | Noted: 2023-02-04

## 2023-02-04 PROBLEM — M17.12 DEGENERATIVE ARTHRITIS OF LEFT KNEE: Status: ACTIVE | Noted: 2023-02-04

## 2023-02-04 PROBLEM — R73.9 BORDERLINE HYPERGLYCEMIA: Status: ACTIVE | Noted: 2023-02-04

## 2023-02-04 PROBLEM — D72.819 LEUKOPENIA: Status: ACTIVE | Noted: 2023-02-04

## 2023-02-04 PROBLEM — H40.9 GLAUCOMA: Status: ACTIVE | Noted: 2023-02-04

## 2023-02-04 PROBLEM — L30.9 PERIOCULAR DERMATITIS: Status: ACTIVE | Noted: 2023-02-04

## 2023-02-04 PROBLEM — R63.5 WEIGHT GAIN: Status: ACTIVE | Noted: 2023-02-04

## 2023-02-04 PROBLEM — N39.0 RECURRENT URINARY TRACT INFECTION: Status: ACTIVE | Noted: 2023-02-04

## 2023-02-04 PROBLEM — F41.1 GENERALIZED ANXIETY DISORDER: Status: ACTIVE | Noted: 2023-02-04

## 2023-02-04 PROBLEM — R05.9 COUGH: Status: ACTIVE | Noted: 2023-02-04

## 2023-02-04 PROBLEM — R11.15 NON-INTRACTABLE CYCLICAL VOMITING WITH NAUSEA: Status: ACTIVE | Noted: 2023-02-04

## 2023-02-04 RX ORDER — TRIAMCINOLONE ACETONIDE 1 MG/G
OINTMENT TOPICAL
COMMUNITY
Start: 2021-07-16 | End: 2023-03-15 | Stop reason: SDUPTHER

## 2023-02-04 RX ORDER — VALACYCLOVIR HYDROCHLORIDE 500 MG/1
500 TABLET, FILM COATED ORAL 2 TIMES DAILY
COMMUNITY
Start: 2022-03-07 | End: 2023-03-06

## 2023-02-04 RX ORDER — CYCLOBENZAPRINE HCL 5 MG
5 TABLET ORAL EVERY 6 HOURS PRN
COMMUNITY
End: 2023-05-16 | Stop reason: SDUPTHER

## 2023-02-04 RX ORDER — OLOPATADINE HYDROCHLORIDE 2 MG/ML
1 SOLUTION/ DROPS OPHTHALMIC DAILY
COMMUNITY
Start: 2021-04-27 | End: 2023-10-09 | Stop reason: ALTCHOICE

## 2023-02-04 RX ORDER — LATANOPROST 50 UG/ML
SOLUTION/ DROPS OPHTHALMIC
COMMUNITY
Start: 2020-02-17 | End: 2023-10-09 | Stop reason: ALTCHOICE

## 2023-02-04 RX ORDER — CLOBETASOL PROPIONATE 0.5 MG/G
AEROSOL, FOAM TOPICAL DAILY
COMMUNITY
Start: 2021-04-21

## 2023-02-04 RX ORDER — MELOXICAM 15 MG/1
1 TABLET ORAL
COMMUNITY
Start: 2022-02-21 | End: 2023-05-16 | Stop reason: ALTCHOICE

## 2023-02-04 RX ORDER — CICLOPIROX 80 MG/ML
SOLUTION TOPICAL
COMMUNITY
Start: 2022-06-07 | End: 2024-04-22 | Stop reason: WASHOUT

## 2023-02-04 RX ORDER — BIMATOPROST 0.3 MG/ML
1 SOLUTION/ DROPS OPHTHALMIC EVERY EVENING
COMMUNITY
Start: 2020-07-01

## 2023-02-04 RX ORDER — PREDNISONE 10 MG/1
TABLET ORAL
COMMUNITY
End: 2023-03-15 | Stop reason: SDUPTHER

## 2023-02-04 RX ORDER — ACETAMINOPHEN 500 MG
1000 TABLET ORAL EVERY 8 HOURS PRN
COMMUNITY
Start: 2018-08-07

## 2023-02-04 RX ORDER — ASCORBIC ACID 500 MG
1000 TABLET ORAL DAILY
COMMUNITY
Start: 2020-01-14

## 2023-02-04 RX ORDER — ACETAMINOPHEN 500 MG
1 TABLET ORAL 2 TIMES DAILY
COMMUNITY
Start: 2018-11-02

## 2023-02-04 RX ORDER — GUAIFENESIN 1200 MG/1
TABLET, EXTENDED RELEASE ORAL
COMMUNITY
End: 2023-03-15 | Stop reason: ALTCHOICE

## 2023-03-02 ENCOUNTER — HOSPITAL ENCOUNTER (OUTPATIENT)
Dept: WOMENS IMAGING | Age: 78
Discharge: HOME | End: 2023-03-04
Payer: MEDICARE

## 2023-03-02 DIAGNOSIS — Z12.31 SCREENING MAMMOGRAM FOR BREAST CANCER: ICD-10-CM

## 2023-03-02 DIAGNOSIS — Z78.0 POSTMENOPAUSAL: ICD-10-CM

## 2023-03-02 DIAGNOSIS — Z13.820 OSTEOPOROSIS SCREENING: ICD-10-CM

## 2023-03-02 PROCEDURE — 77080 DXA BONE DENSITY AXIAL: CPT

## 2023-03-02 PROCEDURE — 77067 SCR MAMMO BI INCL CAD: CPT

## 2023-03-06 DIAGNOSIS — B02.9 HERPES ZOSTER WITHOUT COMPLICATION: Primary | ICD-10-CM

## 2023-03-06 RX ORDER — VALACYCLOVIR HYDROCHLORIDE 500 MG/1
500 TABLET, FILM COATED ORAL 2 TIMES DAILY
Qty: 30 TABLET | Refills: 0 | Status: SHIPPED | OUTPATIENT
Start: 2023-03-06 | End: 2023-03-13

## 2023-03-10 LAB
APPEARANCE, URINE: ABNORMAL
BACTERIA, URINE: ABNORMAL /HPF
BILIRUBIN, URINE: NEGATIVE
BLOOD, URINE: NEGATIVE
COLOR, URINE: ABNORMAL
GLUCOSE, URINE: NEGATIVE MG/DL
KETONES, URINE: NEGATIVE MG/DL
LEUKOCYTE ESTERASE, URINE: ABNORMAL
MUCUS, URINE: ABNORMAL /LPF
NITRITE, URINE: NEGATIVE
PH, URINE: 5 (ref 5–8)
PROTEIN, URINE: NEGATIVE MG/DL
RBC, URINE: ABNORMAL /HPF (ref 0–5)
SPECIFIC GRAVITY, URINE: 1.02 (ref 1–1.03)
SQUAMOUS EPITHELIAL CELLS, URINE: 2 /HPF
UROBILINOGEN, URINE: <2 MG/DL (ref 0–1.9)
WBC, URINE: 9 /HPF (ref 0–5)

## 2023-03-13 DIAGNOSIS — B02.9 HERPES ZOSTER WITHOUT COMPLICATION: ICD-10-CM

## 2023-03-13 LAB — URINE CULTURE: ABNORMAL

## 2023-03-13 RX ORDER — VALACYCLOVIR HYDROCHLORIDE 500 MG/1
TABLET, FILM COATED ORAL
Qty: 30 TABLET | Refills: 0 | Status: SHIPPED | OUTPATIENT
Start: 2023-03-13 | End: 2023-03-15 | Stop reason: SDUPTHER

## 2023-03-15 ENCOUNTER — OFFICE VISIT (OUTPATIENT)
Dept: PRIMARY CARE | Facility: CLINIC | Age: 78
End: 2023-03-15
Payer: MEDICARE

## 2023-03-15 VITALS
OXYGEN SATURATION: 98 % | HEIGHT: 63 IN | SYSTOLIC BLOOD PRESSURE: 118 MMHG | DIASTOLIC BLOOD PRESSURE: 62 MMHG | WEIGHT: 187 LBS | HEART RATE: 87 BPM | BODY MASS INDEX: 33.13 KG/M2 | RESPIRATION RATE: 20 BRPM

## 2023-03-15 DIAGNOSIS — F41.8 ANXIETY WITH DEPRESSION: ICD-10-CM

## 2023-03-15 DIAGNOSIS — D72.819 LEUKOPENIA, UNSPECIFIED TYPE: ICD-10-CM

## 2023-03-15 DIAGNOSIS — R73.03 PREDIABETES: ICD-10-CM

## 2023-03-15 DIAGNOSIS — B02.8 HERPES ZOSTER WITH COMPLICATION: Primary | ICD-10-CM

## 2023-03-15 PROCEDURE — 1036F TOBACCO NON-USER: CPT | Performed by: INTERNAL MEDICINE

## 2023-03-15 PROCEDURE — 1159F MED LIST DOCD IN RCRD: CPT | Performed by: INTERNAL MEDICINE

## 2023-03-15 PROCEDURE — 3078F DIAST BP <80 MM HG: CPT | Performed by: INTERNAL MEDICINE

## 2023-03-15 PROCEDURE — 96372 THER/PROPH/DIAG INJ SC/IM: CPT | Performed by: INTERNAL MEDICINE

## 2023-03-15 PROCEDURE — 99213 OFFICE O/P EST LOW 20 MIN: CPT | Performed by: INTERNAL MEDICINE

## 2023-03-15 PROCEDURE — 3074F SYST BP LT 130 MM HG: CPT | Performed by: INTERNAL MEDICINE

## 2023-03-15 PROCEDURE — 1157F ADVNC CARE PLAN IN RCRD: CPT | Performed by: INTERNAL MEDICINE

## 2023-03-15 RX ORDER — PREDNISONE 10 MG/1
TABLET ORAL
Qty: 37 TABLET | Refills: 0 | Status: SHIPPED | OUTPATIENT
Start: 2023-03-15 | End: 2023-05-16 | Stop reason: ALTCHOICE

## 2023-03-15 RX ORDER — METHYLPREDNISOLONE ACETATE 40 MG/ML
40 INJECTION, SUSPENSION INTRA-ARTICULAR; INTRALESIONAL; INTRAMUSCULAR; SOFT TISSUE ONCE
Status: COMPLETED | OUTPATIENT
Start: 2023-03-15 | End: 2023-03-15

## 2023-03-15 RX ORDER — VALACYCLOVIR HYDROCHLORIDE 1 G/1
1000 TABLET, FILM COATED ORAL 3 TIMES DAILY
Qty: 30 TABLET | Refills: 0 | Status: SHIPPED | OUTPATIENT
Start: 2023-03-15 | End: 2023-03-25

## 2023-03-15 RX ORDER — TRIAMCINOLONE ACETONIDE 1 MG/G
OINTMENT TOPICAL 3 TIMES DAILY
Qty: 80 G | Refills: 1 | Status: SHIPPED | OUTPATIENT
Start: 2023-03-15 | End: 2023-04-14

## 2023-03-15 RX ADMIN — METHYLPREDNISOLONE ACETATE 40 MG: 40 INJECTION, SUSPENSION INTRA-ARTICULAR; INTRALESIONAL; INTRAMUSCULAR; SOFT TISSUE at 15:54

## 2023-03-15 NOTE — PATIENT INSTRUCTIONS
Thank you very much for coming.  I am very happy to see you.    Unfortunately, your right.  You are under a lot of STRESS and lack of SLEEP.  You are even DEHYDRATED.  As such, you are at increased risk for reactivation of HERPES ZOSTER or SHINGLES.    I do understand that you are under a lot of strain, but there is little that you can do about this.  Please drink lots of fluids throughout the day.  Please find that time to get some rest and sleep.  Please engage in AEROBIC activities, brisk walking 10 minutes in the morning, and brisk walking in the afternoon or early evening.  This will also help with stress and improvement of your immune system.    MULTIVITAMIN like Centrum Silver with BREAKFAST every day.  VITAMIN C 500 mg with LUNCH every day as well.  Do not take together with multivitamin so that you can improve absorption.    You will benefit from seeing INFECTIOUS DISEASE sometime soon.    Please continue seeing your UROLOGIST, and continue his antibiotics.    Please drink lots of fluids throughout the day, and please urinate every 4 hours while awake.    For your SHINGLES,  I have reordered your TRIAMCINOLONE, just in case you need more steroid ointment.  Apply a thin layer to the rash 3 times a day.  Do not cover the area with a bandage.    You will benefit from STEROID injection now, followed by PREDNISONE 10 mg tablets,  Take 2 tablets with food now,  Then take 3 tablets with breakfast for 2 days,  Then take 2 tablets with breakfast for 3 days,  Then take 1 tablet with breakfast until all gone.    Again, please drink lots of fluids throughout the day.    Also, you will benefit from a higher dose of VALACYCLOVIR or Valtrex.  Take 1 g tablet 3 times a day with food.  Space your doses 6 to 8 hours apart every day.    Again, thank you very much for coming.  You will benefit from repeat FASTING laboratory examinations in about 2 months, then see me soon after, so that we can make sure that you are coping  with stress.  We will also review your overall health.    I hope you have a chance to get some rest.  Please continue to take care of each other.  Please continue to pray for our recovery from this pandemic.  I hope you have a blessed Lent and a happy Easter.                Return in 2 months.  20 minutes please.  FASTING laboratory examination, then see me soon after.  Assess hemodynamics, cardiovascular risk, assess mood and energy, signs of debility, leukopenia, recurrent herpes zoster eruptions.  Reassess preventive strategies.  Coordinate with ID, UROLOGY, GYNECOLOGY, OPHTHALMOLOGY.            0  Return in 2 months.  20 minutes please.  FASTING laboratory examination, then see me soon after.  Assess hemodynamics, cardiovascular risk, assess mood and energy, signs of debility, leukopenia, recurrent herpes zoster eruptions.  Reassess preventive strategies.  Coordinate with ID, UROLOGY, GYNECOLOGY, OPHTHALMOLOGY.            0
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
## 2023-03-20 ENCOUNTER — APPOINTMENT (OUTPATIENT)
Dept: PRIMARY CARE | Facility: CLINIC | Age: 78
End: 2023-03-20
Payer: MEDICARE

## 2023-03-21 ENCOUNTER — OFFICE VISIT (OUTPATIENT)
Dept: OBGYN CLINIC | Age: 78
End: 2023-03-21
Payer: MEDICARE

## 2023-03-21 VITALS
DIASTOLIC BLOOD PRESSURE: 64 MMHG | BODY MASS INDEX: 33.49 KG/M2 | SYSTOLIC BLOOD PRESSURE: 136 MMHG | HEIGHT: 63 IN | WEIGHT: 189 LBS

## 2023-03-21 DIAGNOSIS — M85.80 OSTEOPENIA, UNSPECIFIED LOCATION: Primary | ICD-10-CM

## 2023-03-21 PROCEDURE — 1036F TOBACCO NON-USER: CPT | Performed by: OBSTETRICS & GYNECOLOGY

## 2023-03-21 PROCEDURE — 99212 OFFICE O/P EST SF 10 MIN: CPT | Performed by: OBSTETRICS & GYNECOLOGY

## 2023-03-21 PROCEDURE — G8399 PT W/DXA RESULTS DOCUMENT: HCPCS | Performed by: OBSTETRICS & GYNECOLOGY

## 2023-03-21 PROCEDURE — G8417 CALC BMI ABV UP PARAM F/U: HCPCS | Performed by: OBSTETRICS & GYNECOLOGY

## 2023-03-21 PROCEDURE — 1123F ACP DISCUSS/DSCN MKR DOCD: CPT | Performed by: OBSTETRICS & GYNECOLOGY

## 2023-03-21 PROCEDURE — G8484 FLU IMMUNIZE NO ADMIN: HCPCS | Performed by: OBSTETRICS & GYNECOLOGY

## 2023-03-21 PROCEDURE — G8427 DOCREV CUR MEDS BY ELIG CLIN: HCPCS | Performed by: OBSTETRICS & GYNECOLOGY

## 2023-03-21 PROCEDURE — 1090F PRES/ABSN URINE INCON ASSESS: CPT | Performed by: OBSTETRICS & GYNECOLOGY

## 2023-03-21 RX ORDER — DOXYCYCLINE HYCLATE 100 MG/1
100 CAPSULE ORAL 2 TIMES DAILY
COMMUNITY
Start: 2023-02-03

## 2023-03-21 RX ORDER — PREDNISONE 10 MG/1
TABLET ORAL
COMMUNITY
Start: 2023-03-15

## 2023-03-21 RX ORDER — VALACYCLOVIR HYDROCHLORIDE 1 G/1
TABLET, FILM COATED ORAL
COMMUNITY
Start: 2023-03-15

## 2023-03-21 SDOH — ECONOMIC STABILITY: FOOD INSECURITY: WITHIN THE PAST 12 MONTHS, YOU WORRIED THAT YOUR FOOD WOULD RUN OUT BEFORE YOU GOT MONEY TO BUY MORE.: NEVER TRUE

## 2023-03-21 SDOH — ECONOMIC STABILITY: INCOME INSECURITY: HOW HARD IS IT FOR YOU TO PAY FOR THE VERY BASICS LIKE FOOD, HOUSING, MEDICAL CARE, AND HEATING?: NOT HARD AT ALL

## 2023-03-21 SDOH — ECONOMIC STABILITY: FOOD INSECURITY: WITHIN THE PAST 12 MONTHS, THE FOOD YOU BOUGHT JUST DIDN'T LAST AND YOU DIDN'T HAVE MONEY TO GET MORE.: NEVER TRUE

## 2023-03-21 SDOH — ECONOMIC STABILITY: HOUSING INSECURITY
IN THE LAST 12 MONTHS, WAS THERE A TIME WHEN YOU DID NOT HAVE A STEADY PLACE TO SLEEP OR SLEPT IN A SHELTER (INCLUDING NOW)?: NO

## 2023-03-21 ASSESSMENT — ENCOUNTER SYMPTOMS
ABDOMINAL PAIN: 0
EYES NEGATIVE: 1
NAUSEA: 0
CONSTIPATION: 0
RESPIRATORY NEGATIVE: 1
BLOOD IN STOOL: 0
ALLERGIC/IMMUNOLOGIC NEGATIVE: 1
DIARRHEA: 0
VOMITING: 0
ANAL BLEEDING: 0
RECTAL PAIN: 0
ABDOMINAL DISTENTION: 0

## 2023-03-21 ASSESSMENT — PATIENT HEALTH QUESTIONNAIRE - PHQ9
2. FEELING DOWN, DEPRESSED OR HOPELESS: 0
1. LITTLE INTEREST OR PLEASURE IN DOING THINGS: 0
SUM OF ALL RESPONSES TO PHQ QUESTIONS 1-9: 0
SUM OF ALL RESPONSES TO PHQ9 QUESTIONS 1 & 2: 0
SUM OF ALL RESPONSES TO PHQ QUESTIONS 1-9: 0

## 2023-03-21 NOTE — PROGRESS NOTES
hours as needed for Pain      calcium-vitamin D (OSCAL-500) 500-200 MG-UNIT per tablet Take 1 tablet by mouth daily      vitamin C (ASCORBIC ACID) 500 MG tablet Take 1,000 mg by mouth daily      LUMIGAN 0.01 % SOLN ophthalmic drops Place 1 drop into both eyes nightly       Probiotic Product (PROBIOTIC PO) Take by mouth       No current facility-administered medications for this visit. Social History     Socioeconomic History    Marital status:      Spouse name: Not on file    Number of children: Not on file    Years of education: Not on file    Highest education level: Not on file   Occupational History    Not on file   Tobacco Use    Smoking status: Never    Smokeless tobacco: Never   Substance and Sexual Activity    Alcohol use: Yes     Comment: occ    Drug use: No    Sexual activity: Yes     Partners: Male   Other Topics Concern    Not on file   Social History Narrative    Not on file     Social Determinants of Health     Financial Resource Strain: Low Risk     Difficulty of Paying Living Expenses: Not hard at all   Food Insecurity: No Food Insecurity    Worried About [address] in the Last Year: Never true    [address] in the Last Year: Never true   Transportation Needs: Unknown    Lack of Transportation (Medical): Not on file    Lack of Transportation (Non-Medical):  No   Physical Activity: Not on file   Stress: Not on file   Social Connections: Not on file   Intimate Partner Violence: Not on file   Housing Stability: Unknown    Unable to Pay for Housing in the Last Year: Not on file    Number of Places Lived in the Last Year: Not on file    Unstable Housing in the Last Year: No     Family History   Problem Relation Age of Onset    COPD Mother     Coronary Art Dis Mother     COPD Father     Dementia Father     Other Father         series of pneumonia     No Known Problems Sister     Heart Defect Maternal Grandmother         enlarged heart     Hearing Loss Maternal Grandmother

## 2023-03-22 ENCOUNTER — TELEPHONE (OUTPATIENT)
Dept: PRIMARY CARE | Facility: CLINIC | Age: 78
End: 2023-03-22
Payer: MEDICARE

## 2023-03-30 ENCOUNTER — OFFICE VISIT (OUTPATIENT)
Dept: INFECTIOUS DISEASES | Age: 78
End: 2023-03-30

## 2023-03-30 VITALS
DIASTOLIC BLOOD PRESSURE: 62 MMHG | HEART RATE: 84 BPM | SYSTOLIC BLOOD PRESSURE: 126 MMHG | BODY MASS INDEX: 33.48 KG/M2 | WEIGHT: 189 LBS | TEMPERATURE: 98.6 F

## 2023-03-30 DIAGNOSIS — B02.9 HERPES ZOSTER WITHOUT COMPLICATION: ICD-10-CM

## 2023-03-30 DIAGNOSIS — F41.9 ANXIETY: ICD-10-CM

## 2023-03-30 DIAGNOSIS — B02.9 HERPES ZOSTER WITHOUT COMPLICATION: Primary | ICD-10-CM

## 2023-03-30 DIAGNOSIS — F43.9 STRESS: ICD-10-CM

## 2023-03-30 DIAGNOSIS — Z11.59 NEED FOR HEPATITIS C SCREENING TEST: ICD-10-CM

## 2023-03-30 LAB
ALBUMIN SERPL-MCNC: 4.2 G/DL (ref 3.5–4.6)
ALP SERPL-CCNC: 100 U/L (ref 40–130)
ALT SERPL-CCNC: 17 U/L (ref 0–33)
ANION GAP SERPL CALCULATED.3IONS-SCNC: 12 MEQ/L (ref 9–15)
AST SERPL-CCNC: 13 U/L (ref 0–35)
BILIRUB SERPL-MCNC: 0.3 MG/DL (ref 0.2–0.7)
BUN SERPL-MCNC: 19 MG/DL (ref 8–23)
CALCIUM SERPL-MCNC: 9.2 MG/DL (ref 8.5–9.9)
CHLORIDE SERPL-SCNC: 105 MEQ/L (ref 95–107)
CO2 SERPL-SCNC: 27 MEQ/L (ref 20–31)
CREAT SERPL-MCNC: 0.62 MG/DL (ref 0.5–0.9)
ERYTHROCYTE [DISTWIDTH] IN BLOOD BY AUTOMATED COUNT: 14.2 % (ref 11.5–14.5)
GLOBULIN SER CALC-MCNC: 2.2 G/DL (ref 2.3–3.5)
GLUCOSE SERPL-MCNC: 113 MG/DL (ref 70–99)
HCT VFR BLD AUTO: 41 % (ref 37–47)
HGB BLD-MCNC: 13.6 G/DL (ref 12–16)
MCH RBC QN AUTO: 31.1 PG (ref 27–31.3)
MCHC RBC AUTO-ENTMCNC: 33.1 % (ref 33–37)
MCV RBC AUTO: 93.7 FL (ref 79.4–94.8)
PLATELET # BLD AUTO: 215 K/UL (ref 130–400)
POTASSIUM SERPL-SCNC: 3.7 MEQ/L (ref 3.4–4.9)
PROT SERPL-MCNC: 6.4 G/DL (ref 6.3–8)
RBC # BLD AUTO: 4.37 M/UL (ref 4.2–5.4)
SODIUM SERPL-SCNC: 144 MEQ/L (ref 135–144)
WBC # BLD AUTO: 8.9 K/UL (ref 4.8–10.8)

## 2023-03-30 ASSESSMENT — PATIENT HEALTH QUESTIONNAIRE - PHQ9
SUM OF ALL RESPONSES TO PHQ QUESTIONS 1-9: 0
SUM OF ALL RESPONSES TO PHQ QUESTIONS 1-9: 0
1. LITTLE INTEREST OR PLEASURE IN DOING THINGS: 0
SUM OF ALL RESPONSES TO PHQ QUESTIONS 1-9: 0
2. FEELING DOWN, DEPRESSED OR HOPELESS: 0
SUM OF ALL RESPONSES TO PHQ9 QUESTIONS 1 & 2: 0
SUM OF ALL RESPONSES TO PHQ QUESTIONS 1-9: 0

## 2023-03-31 LAB — HEPATITIS C ANTIBODY: NONREACTIVE

## 2023-03-31 ASSESSMENT — PATIENT HEALTH QUESTIONNAIRE - PHQ9
SUM OF ALL RESPONSES TO PHQ QUESTIONS 1-9: 1
1. LITTLE INTEREST OR PLEASURE IN DOING THINGS: 0
SUM OF ALL RESPONSES TO PHQ QUESTIONS 1-9: 1
SUM OF ALL RESPONSES TO PHQ QUESTIONS 1-9: 1
SUM OF ALL RESPONSES TO PHQ9 QUESTIONS 1 & 2: 1
2. FEELING DOWN, DEPRESSED OR HOPELESS: 1
SUM OF ALL RESPONSES TO PHQ QUESTIONS 1-9: 1
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
## 2023-05-05 ENCOUNTER — TELEPHONE (OUTPATIENT)
Dept: PRIMARY CARE | Facility: CLINIC | Age: 78
End: 2023-05-05
Payer: MEDICARE

## 2023-05-05 DIAGNOSIS — E78.2 MIXED HYPERLIPIDEMIA: Primary | ICD-10-CM

## 2023-05-05 DIAGNOSIS — E55.9 VITAMIN D DEFICIENCY: ICD-10-CM

## 2023-05-05 DIAGNOSIS — N39.0 RECURRENT UTI: ICD-10-CM

## 2023-05-05 DIAGNOSIS — R73.03 PREDIABETES: ICD-10-CM

## 2023-05-09 ENCOUNTER — LAB (OUTPATIENT)
Dept: LAB | Facility: LAB | Age: 78
End: 2023-05-09
Payer: MEDICARE

## 2023-05-09 DIAGNOSIS — E55.9 VITAMIN D DEFICIENCY: ICD-10-CM

## 2023-05-09 DIAGNOSIS — N39.0 RECURRENT UTI: ICD-10-CM

## 2023-05-09 DIAGNOSIS — R73.03 PREDIABETES: ICD-10-CM

## 2023-05-09 DIAGNOSIS — E78.2 MIXED HYPERLIPIDEMIA: ICD-10-CM

## 2023-05-09 LAB
ALANINE AMINOTRANSFERASE (SGPT) (U/L) IN SER/PLAS: 12 U/L (ref 7–45)
ALBUMIN (G/DL) IN SER/PLAS: 4.3 G/DL (ref 3.4–5)
ALKALINE PHOSPHATASE (U/L) IN SER/PLAS: 101 U/L (ref 33–136)
AMORPHOUS CRYSTALS, URINE: ABNORMAL /HPF
ANION GAP IN SER/PLAS: 13 MMOL/L (ref 10–20)
APPEARANCE, URINE: CLEAR
ASPARTATE AMINOTRANSFERASE (SGOT) (U/L) IN SER/PLAS: 15 U/L (ref 9–39)
BACTERIA, URINE: ABNORMAL /HPF
BILIRUBIN TOTAL (MG/DL) IN SER/PLAS: 0.6 MG/DL (ref 0–1.2)
BILIRUBIN, URINE: NEGATIVE
BLOOD, URINE: NEGATIVE
CALCIDIOL (25 OH VITAMIN D3) (NG/ML) IN SER/PLAS: 54 NG/ML
CALCIUM (MG/DL) IN SER/PLAS: 9.2 MG/DL (ref 8.6–10.3)
CARBON DIOXIDE, TOTAL (MMOL/L) IN SER/PLAS: 29 MMOL/L (ref 21–32)
CHLORIDE (MMOL/L) IN SER/PLAS: 103 MMOL/L (ref 98–107)
CHOLESTEROL (MG/DL) IN SER/PLAS: 222 MG/DL (ref 0–199)
CHOLESTEROL IN HDL (MG/DL) IN SER/PLAS: 58.7 MG/DL
CHOLESTEROL/HDL RATIO: 3.8
COLOR, URINE: YELLOW
CREATININE (MG/DL) IN SER/PLAS: 0.76 MG/DL (ref 0.5–1.05)
ESTIMATED AVERAGE GLUCOSE FOR HBA1C: 120 MG/DL
GFR FEMALE: 81 ML/MIN/1.73M2
GLUCOSE (MG/DL) IN SER/PLAS: 104 MG/DL (ref 74–99)
GLUCOSE, URINE: NEGATIVE MG/DL
HEMOGLOBIN A1C/HEMOGLOBIN TOTAL IN BLOOD: 5.8 %
KETONES, URINE: NEGATIVE MG/DL
LDL: 138 MG/DL (ref 0–99)
LEUKOCYTE ESTERASE, URINE: ABNORMAL
NITRITE, URINE: NEGATIVE
PH, URINE: 6 (ref 5–8)
POTASSIUM (MMOL/L) IN SER/PLAS: 4.1 MMOL/L (ref 3.5–5.3)
PROTEIN TOTAL: 6.6 G/DL (ref 6.4–8.2)
PROTEIN, URINE: NEGATIVE MG/DL
RBC, URINE: 2 /HPF (ref 0–5)
SODIUM (MMOL/L) IN SER/PLAS: 141 MMOL/L (ref 136–145)
SPECIFIC GRAVITY, URINE: 1.01 (ref 1–1.03)
SQUAMOUS EPITHELIAL CELLS, URINE: 1 /HPF
THYROTROPIN (MIU/L) IN SER/PLAS BY DETECTION LIMIT <= 0.05 MIU/L: 1.59 MIU/L (ref 0.44–3.98)
TRIGLYCERIDE (MG/DL) IN SER/PLAS: 126 MG/DL (ref 0–149)
UREA NITROGEN (MG/DL) IN SER/PLAS: 15 MG/DL (ref 6–23)
UROBILINOGEN, URINE: <2 MG/DL (ref 0–1.9)
VLDL: 25 MG/DL (ref 0–40)
WBC, URINE: 4 /HPF (ref 0–5)

## 2023-05-09 PROCEDURE — 80061 LIPID PANEL: CPT

## 2023-05-09 PROCEDURE — 36415 COLL VENOUS BLD VENIPUNCTURE: CPT

## 2023-05-09 PROCEDURE — 81001 URINALYSIS AUTO W/SCOPE: CPT

## 2023-05-09 PROCEDURE — 87086 URINE CULTURE/COLONY COUNT: CPT

## 2023-05-09 PROCEDURE — 83036 HEMOGLOBIN GLYCOSYLATED A1C: CPT

## 2023-05-09 PROCEDURE — 82306 VITAMIN D 25 HYDROXY: CPT

## 2023-05-09 PROCEDURE — 84443 ASSAY THYROID STIM HORMONE: CPT

## 2023-05-09 PROCEDURE — 80053 COMPREHEN METABOLIC PANEL: CPT

## 2023-05-11 LAB — URINE CULTURE: ABNORMAL

## 2023-05-16 ENCOUNTER — OFFICE VISIT (OUTPATIENT)
Dept: PRIMARY CARE | Facility: CLINIC | Age: 78
End: 2023-05-16
Payer: MEDICARE

## 2023-05-16 VITALS
BODY MASS INDEX: 34.73 KG/M2 | SYSTOLIC BLOOD PRESSURE: 132 MMHG | HEART RATE: 78 BPM | WEIGHT: 196 LBS | RESPIRATION RATE: 22 BRPM | HEIGHT: 63 IN | OXYGEN SATURATION: 96 % | DIASTOLIC BLOOD PRESSURE: 82 MMHG

## 2023-05-16 DIAGNOSIS — M79.2 NEUROPATHIC PAIN: ICD-10-CM

## 2023-05-16 DIAGNOSIS — R73.03 PREDIABETES: ICD-10-CM

## 2023-05-16 DIAGNOSIS — G24.3 TORTICOLLIS, SPASMODIC: Primary | ICD-10-CM

## 2023-05-16 DIAGNOSIS — E66.09 NON MORBID OBESITY DUE TO EXCESS CALORIES: ICD-10-CM

## 2023-05-16 PROCEDURE — 1036F TOBACCO NON-USER: CPT | Performed by: INTERNAL MEDICINE

## 2023-05-16 PROCEDURE — 1159F MED LIST DOCD IN RCRD: CPT | Performed by: INTERNAL MEDICINE

## 2023-05-16 PROCEDURE — 3079F DIAST BP 80-89 MM HG: CPT | Performed by: INTERNAL MEDICINE

## 2023-05-16 PROCEDURE — 99213 OFFICE O/P EST LOW 20 MIN: CPT | Performed by: INTERNAL MEDICINE

## 2023-05-16 PROCEDURE — 3075F SYST BP GE 130 - 139MM HG: CPT | Performed by: INTERNAL MEDICINE

## 2023-05-16 PROCEDURE — 1157F ADVNC CARE PLAN IN RCRD: CPT | Performed by: INTERNAL MEDICINE

## 2023-05-16 RX ORDER — IBUPROFEN 600 MG/1
TABLET ORAL
Qty: 45 TABLET | Refills: 0 | Status: SHIPPED | OUTPATIENT
Start: 2023-05-16

## 2023-05-16 RX ORDER — CYCLOBENZAPRINE HCL 5 MG
TABLET ORAL
Qty: 30 TABLET | Refills: 0 | Status: SHIPPED | OUTPATIENT
Start: 2023-05-16 | End: 2023-10-09 | Stop reason: ALTCHOICE

## 2023-05-16 NOTE — PATIENT INSTRUCTIONS
Thank you very much for coming.  I am very happy to see you.    I examined your ears, and both of them are doing very well.  Furthermore, I do understand that the pain is not only affecting the left earlobe, but also somewhere deep and below it.  The pain also seems to be SPASMIC in nature, radiating down the left side of your neck.    At this point, the pain appears to be MUSCULAR in nature, with some NERVE involvement, which seems to also affect the surface of the left ear.  It is remarkable that you have not had any trauma that might of caused this.    It is worthwhile to do a trial of a MUSCLE RELAXANT, CYCLOBENZAPRINE 5 mg.  Take 1 tablet every 6-8 hours as needed for musculoskeletal pain, especially spasm.  Watch out for sleepiness.  No driving please if you are drowsy.  Do not take with alcohol.    Be careful because cyclobenzaprine can also cause increased risk of falling.    I do understand that you have been using MOIST HEAT to the area as well.  This is an excellent idea!  Please continue doing this.    ALSO, make sure that you are no longer taking MELOXICAM.  No more meloxicam.  INSTEAD, you will be using IBUPROFEN 600 mg every 6-8 hours as needed.  Please take with food always, because this medication can irritate your stomach.  No more than 3 tablets please in 24 hours.    You can take ibuprofen and meloxicam together, but just watch out for excessive sleepiness.  No driving please if you are drowsy.    I would recommend that you postpone your appointment for FRIDAY instead of tomorrow.  This way, you can give the medications a chance to work.  Of course, if the pain becomes worse and you need to talk to me, just go ahead and make a phone call.    Again, thank you very much for coming.  Please do not hesitate to call with questions or concerns.  Please continue to take care of yourself and your family, and please continue to pray for our recovery from this pandemic.            0  Please reschedule  tomorrow's appointment to FRIDAY instead, 3 days from now.  20 minutes please.  Reassess recovery from torticollis, neuropathic pain.  Review preventive strategies, cardiovascular risk.            0
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
## 2023-05-17 ENCOUNTER — TELEPHONE (OUTPATIENT)
Dept: PRIMARY CARE | Facility: CLINIC | Age: 78
End: 2023-05-17

## 2023-05-17 ENCOUNTER — APPOINTMENT (OUTPATIENT)
Dept: PRIMARY CARE | Facility: CLINIC | Age: 78
End: 2023-05-17
Payer: MEDICARE

## 2023-05-17 DIAGNOSIS — M79.2 NEUROPATHIC PAIN: ICD-10-CM

## 2023-05-17 DIAGNOSIS — G24.3 TORTICOLLIS, SPASMODIC: Primary | ICD-10-CM

## 2023-05-17 RX ORDER — OXYCODONE AND ACETAMINOPHEN 5; 325 MG/1; MG/1
1 TABLET ORAL EVERY 8 HOURS PRN
Qty: 9 TABLET | Refills: 0 | Status: SHIPPED | OUTPATIENT
Start: 2023-05-17 | End: 2023-05-20

## 2023-05-19 ENCOUNTER — OFFICE VISIT (OUTPATIENT)
Dept: PRIMARY CARE | Facility: CLINIC | Age: 78
End: 2023-05-19
Payer: MEDICARE

## 2023-05-19 VITALS
WEIGHT: 191 LBS | BODY MASS INDEX: 33.84 KG/M2 | SYSTOLIC BLOOD PRESSURE: 123 MMHG | HEIGHT: 63 IN | HEART RATE: 84 BPM | DIASTOLIC BLOOD PRESSURE: 77 MMHG | OXYGEN SATURATION: 94 %

## 2023-05-19 DIAGNOSIS — R73.03 PREDIABETES: ICD-10-CM

## 2023-05-19 DIAGNOSIS — G24.3 TORTICOLLIS, SPASMODIC: ICD-10-CM

## 2023-05-19 DIAGNOSIS — G50.0 TRIGEMINAL NEURALGIA: Primary | ICD-10-CM

## 2023-05-19 DIAGNOSIS — M79.2 NEUROPATHIC PAIN: ICD-10-CM

## 2023-05-19 DIAGNOSIS — F41.8 ANXIETY WITH DEPRESSION: ICD-10-CM

## 2023-05-19 DIAGNOSIS — B02.8 HERPES ZOSTER WITH COMPLICATION: ICD-10-CM

## 2023-05-19 DIAGNOSIS — B02.29 POSTHERPETIC NEURALGIA: ICD-10-CM

## 2023-05-19 PROCEDURE — 3074F SYST BP LT 130 MM HG: CPT | Performed by: INTERNAL MEDICINE

## 2023-05-19 PROCEDURE — 99214 OFFICE O/P EST MOD 30 MIN: CPT | Performed by: INTERNAL MEDICINE

## 2023-05-19 PROCEDURE — 1159F MED LIST DOCD IN RCRD: CPT | Performed by: INTERNAL MEDICINE

## 2023-05-19 PROCEDURE — 1036F TOBACCO NON-USER: CPT | Performed by: INTERNAL MEDICINE

## 2023-05-19 PROCEDURE — 1157F ADVNC CARE PLAN IN RCRD: CPT | Performed by: INTERNAL MEDICINE

## 2023-05-19 PROCEDURE — 3078F DIAST BP <80 MM HG: CPT | Performed by: INTERNAL MEDICINE

## 2023-05-19 PROCEDURE — 96372 THER/PROPH/DIAG INJ SC/IM: CPT | Performed by: INTERNAL MEDICINE

## 2023-05-19 RX ORDER — PREDNISONE 10 MG/1
10 TABLET ORAL
Qty: 30 TABLET | Refills: 0 | Status: SHIPPED | OUTPATIENT
Start: 2023-05-19 | End: 2023-06-06 | Stop reason: SDUPTHER

## 2023-05-19 RX ORDER — AMITRIPTYLINE HYDROCHLORIDE 10 MG/1
10 TABLET, FILM COATED ORAL NIGHTLY
Qty: 30 TABLET | Refills: 0 | Status: SHIPPED | OUTPATIENT
Start: 2023-05-19 | End: 2023-06-12

## 2023-05-19 RX ORDER — GABAPENTIN 300 MG/1
300 CAPSULE ORAL 2 TIMES DAILY
Qty: 180 CAPSULE | Refills: 0 | Status: SHIPPED | OUTPATIENT
Start: 2023-05-19 | End: 2023-11-22 | Stop reason: ALTCHOICE

## 2023-05-19 RX ORDER — KETOROLAC TROMETHAMINE 30 MG/ML
30 INJECTION, SOLUTION INTRAMUSCULAR; INTRAVENOUS ONCE
Status: COMPLETED | OUTPATIENT
Start: 2023-05-19 | End: 2023-05-19

## 2023-05-19 RX ADMIN — KETOROLAC TROMETHAMINE 30 MG: 30 INJECTION, SOLUTION INTRAMUSCULAR; INTRAVENOUS at 10:37

## 2023-05-19 NOTE — PATIENT INSTRUCTIONS
Thank you very much for coming.  I am very happy to see you.    You have pain that is similar to SHINGLES, but as you pointed out, it is affecting your TRIGEMINAL NERVE distribution, and is called TRIGEMINAL NEURALGIA.    Remarkably, I am surprised that STEROIDS and IBUPROFEN did not seem to help.  LIKEWISE, PERCOCET WAS NOT HELPING.    NSAID injection now.  This may help immediately, but will not last.  Please RESTART PREDNISONE.  Take 2 tablets with food now, then take 1 tablet with breakfast every day until all gone.  ADD AMITRIPTYLINE 10 mg at bedtime.  You will need this probably just for 3 weeks or so.    Also, ADD GABAPENTIN.  This is most likely the medication that was given to you by Dr. Munoz in the past.  Take 300 mg capsule at 3 in the afternoon, and again at bedtime, twice a day please.    I am sorry that you are in such discomfort.  You should start feeling better by the evening tonight.  Hopefully, you will have a good anniversary tomorrow.    Call me if you are no better after 5 days.  Let me see you in 2 weeks just to make sure that everything is going in the right direction.    IBUPROFEN can still be useful if pain happens often.  PERCOCET can still be useful for severe pain, but watch out for excessive sleepiness.  GABAPENTIN, which is the best medicine for you now, can also make you sleepy.  Unfortunately, it does not work right away.  That is why you need multiple medicines.    Again, list of medications:  Prednisone 2 tablets now, then 1 tablet with breakfast every day.  Gabapentin 300 mg at 3 PM and again at bedtime, twice a day.  Ibuprofen as needed, always with food.  Generic Percocet as needed, but watch out for excessive sleepiness and increased risk of falling.  Amitriptyline at bedtime.    I hope you start feeling better.  See you in 2 weeks.            0  Return in 2 weeks.  20 minutes please.  Reassess debility, pain control.  Possibly coordinate with neurology or chronic pain  specialist, depending on presentation.            0

## 2023-05-19 NOTE — PROGRESS NOTES
"Subjective   Patient ID: Elvira Dunne is a 78 y.o. female who presents for 3 Day FU / 2 Month FU (Pt in today for 3 day / 2 month FU).    HPI   Patient miserable.  Pain has seemingly changed, now more sharp and sudden, left-sided.  States that generic Percocet was not helping at all.  Likewise, muscle relaxant not helping.  She has not been able to sleep.  Miserable, perhaps even irritable, but otherwise, not wishing harm to self or others, and just wants to get better.    No blurred vision, no diplopia.  No dysphagia.  No ataxia.  No clumsiness.  No falls.    No dyspepsia.  No chest pain.  No shortness of breath.  Tolerating medications, compliant with recommended regimens.  CONSTITUTIONALLY, no fever, no chills.  No night sweats.  No lingering anorexia or nausea.  No apparent lymphadenopathy.  No apparent weight loss.        Review of Systems  Review of systems as in history of present illness, and otherwise, reviewed separately as well, and was unremarkable/negative/noncontributory.          Objective   /77 (BP Location: Right arm, Patient Position: Sitting)   Pulse 84   Ht 1.6 m (5' 3\")   Wt 86.6 kg (191 lb)   SpO2 94%   BMI 33.83 kg/m²     Physical Exam  In distress from pain, but otherwise, not diaphoretic.  Continues to want to get better and improve quality of life.  Does not wish harm to self or others.    HEAD pink palpebral conjunctivae, anicteric sclerae.  NECK supple, no apparent jugular venous distention.  Pinpoint tenderness/achiness behind the ear and mastoid process.  CARDIOVASCULAR not in distress or diaphoresis.  No bipedal edema.  LUNGS not in distress or diaphoresis.  Not using accessory muscles.  ABDOMEN soft, nontender.  BACK no costovertebral angle tenderness.  EXTREMITIES no clubbing, no cyanosis.  NEURO no facial asymmetry.  No apparent cranial nerve deficits.  Romberg negative.  Ambulating without need of assistance.  No apparent focal weakness.  No tremors.  PSYCH receptive, " appropriate, and eager to maintain and improve quality of life.          Assessment/Plan   Problem List Items Addressed This Visit       Shingles rash    Relevant Medications    gabapentin (Neurontin) 300 mg capsule    predniSONE (Deltasone) 10 mg tablet    amitriptyline (Elavil) 10 mg tablet    ketorolac (Toradol) injection 30 mg (Start on 5/19/2023 10:30 AM)    Anxiety with depression    Relevant Medications    amitriptyline (Elavil) 10 mg tablet    ketorolac (Toradol) injection 30 mg (Start on 5/19/2023 10:30 AM)     Other Visit Diagnoses       Trigeminal neuralgia    -  Primary    Relevant Medications    gabapentin (Neurontin) 300 mg capsule    amitriptyline (Elavil) 10 mg tablet    ketorolac (Toradol) injection 30 mg (Start on 5/19/2023 10:30 AM)    Prediabetes        Relevant Medications    gabapentin (Neurontin) 300 mg capsule    Postherpetic neuralgia        Relevant Medications    gabapentin (Neurontin) 300 mg capsule    amitriptyline (Elavil) 10 mg tablet    ketorolac (Toradol) injection 30 mg (Start on 5/19/2023 10:30 AM)             Thank you very much for coming.  I am very happy to see you.    You have pain that is similar to SHINGLES, but as you pointed out, it is affecting your TRIGEMINAL NERVE distribution, and is called TRIGEMINAL NEURALGIA.    Remarkably, I am surprised that STEROIDS and IBUPROFEN did not seem to help.  LIKEWISE, PERCOCET WAS NOT HELPING.    NSAID injection now.  This may help immediately, but will not last.  Please RESTART PREDNISONE.  Take 2 tablets with food now, then take 1 tablet with breakfast every day until all gone.  ADD AMITRIPTYLINE 10 mg at bedtime.  You will need this probably just for 3 weeks or so.    Also, ADD GABAPENTIN.  This is most likely the medication that was given to you by Dr. Munoz in the past.  Take 300 mg capsule at 3 in the afternoon, and again at bedtime, twice a day please.    I am sorry that you are in such discomfort.  You should start feeling  better by the evening tonight.  Hopefully, you will have a good anniversary tomorrow.    Call me if you are no better after 5 days.  Let me see you in 2 weeks just to make sure that everything is going in the right direction.    IBUPROFEN can still be useful if pain happens often.  PERCOCET can still be useful for severe pain, but watch out for excessive sleepiness.  GABAPENTIN, which is the best medicine for you now, can also make you sleepy.  Unfortunately, it does not work right away.  That is why you need multiple medicines.    Again, list of medications:  Prednisone 2 tablets now, then 1 tablet with breakfast every day.  Gabapentin 300 mg at 3 PM and again at bedtime, twice a day.  Ibuprofen as needed, always with food.  Generic Percocet as needed, but watch out for excessive sleepiness and increased risk of falling.  Amitriptyline at bedtime.    I hope you start feeling better.  See you in 2 weeks.            0  Return in 2 weeks.  20 minutes please.  Reassess debility, pain control.  Possibly coordinate with neurology or chronic pain specialist, depending on presentation.            0

## 2023-06-06 ENCOUNTER — APPOINTMENT (OUTPATIENT)
Dept: PRIMARY CARE | Facility: CLINIC | Age: 78
End: 2023-06-06
Payer: MEDICARE

## 2023-06-06 ENCOUNTER — OFFICE VISIT (OUTPATIENT)
Dept: PRIMARY CARE | Facility: CLINIC | Age: 78
End: 2023-06-06
Payer: MEDICARE

## 2023-06-06 VITALS
OXYGEN SATURATION: 98 % | RESPIRATION RATE: 20 BRPM | DIASTOLIC BLOOD PRESSURE: 62 MMHG | WEIGHT: 192 LBS | BODY MASS INDEX: 34.02 KG/M2 | HEIGHT: 63 IN | HEART RATE: 80 BPM | SYSTOLIC BLOOD PRESSURE: 122 MMHG

## 2023-06-06 DIAGNOSIS — B02.29 POSTHERPETIC NEURALGIA: ICD-10-CM

## 2023-06-06 DIAGNOSIS — G50.0 TRIGEMINAL NEURALGIA: ICD-10-CM

## 2023-06-06 DIAGNOSIS — B02.8 HERPES ZOSTER WITH COMPLICATION: Primary | ICD-10-CM

## 2023-06-06 DIAGNOSIS — F41.8 ANXIETY WITH DEPRESSION: ICD-10-CM

## 2023-06-06 PROCEDURE — 1159F MED LIST DOCD IN RCRD: CPT | Performed by: INTERNAL MEDICINE

## 2023-06-06 PROCEDURE — 3078F DIAST BP <80 MM HG: CPT | Performed by: INTERNAL MEDICINE

## 2023-06-06 PROCEDURE — 1036F TOBACCO NON-USER: CPT | Performed by: INTERNAL MEDICINE

## 2023-06-06 PROCEDURE — 1157F ADVNC CARE PLAN IN RCRD: CPT | Performed by: INTERNAL MEDICINE

## 2023-06-06 PROCEDURE — 99213 OFFICE O/P EST LOW 20 MIN: CPT | Performed by: INTERNAL MEDICINE

## 2023-06-06 PROCEDURE — 3074F SYST BP LT 130 MM HG: CPT | Performed by: INTERNAL MEDICINE

## 2023-06-06 RX ORDER — MELOXICAM 7.5 MG/1
TABLET ORAL
COMMUNITY
Start: 2022-02-21 | End: 2023-10-09 | Stop reason: ALTCHOICE

## 2023-06-06 RX ORDER — KETOCONAZOLE 20 MG/ML
SHAMPOO, SUSPENSION TOPICAL
COMMUNITY
Start: 2019-12-05

## 2023-06-06 RX ORDER — ONDANSETRON 4 MG/1
TABLET, ORALLY DISINTEGRATING ORAL
COMMUNITY
End: 2023-10-09 | Stop reason: ALTCHOICE

## 2023-06-06 RX ORDER — PREDNISONE 10 MG/1
10 TABLET ORAL
Qty: 30 TABLET | Refills: 0 | Status: SHIPPED | OUTPATIENT
Start: 2023-06-06 | End: 2023-10-09 | Stop reason: ALTCHOICE

## 2023-06-06 RX ORDER — TRIAMCINOLONE ACETONIDE 0.25 MG/G
OINTMENT TOPICAL
COMMUNITY
Start: 2023-01-15 | End: 2023-06-06 | Stop reason: SDUPTHER

## 2023-06-06 RX ORDER — ALBUTEROL SULFATE 90 UG/1
AEROSOL, METERED RESPIRATORY (INHALATION)
COMMUNITY
Start: 2020-02-08 | End: 2023-10-09 | Stop reason: ALTCHOICE

## 2023-06-06 RX ORDER — VALACYCLOVIR HYDROCHLORIDE 1 G/1
TABLET, FILM COATED ORAL
COMMUNITY
Start: 2020-10-30 | End: 2023-10-09 | Stop reason: ALTCHOICE

## 2023-06-06 RX ORDER — SODIUM CHLORIDE 5 %
OINTMENT (GRAM) OPHTHALMIC (EYE)
COMMUNITY
Start: 2021-11-04 | End: 2024-04-22 | Stop reason: WASHOUT

## 2023-06-06 RX ORDER — ERGOCALCIFEROL 1.25 MG/1
CAPSULE ORAL
COMMUNITY
Start: 2018-11-05 | End: 2023-10-09 | Stop reason: ALTCHOICE

## 2023-06-06 RX ORDER — TRIAMCINOLONE ACETONIDE 0.25 MG/G
OINTMENT TOPICAL
Qty: 30 G | Refills: 0 | Status: SHIPPED | OUTPATIENT
Start: 2023-06-06

## 2023-06-06 RX ORDER — METOPROLOL SUCCINATE 25 MG/1
TABLET, EXTENDED RELEASE ORAL
COMMUNITY
Start: 2019-10-27 | End: 2023-10-09 | Stop reason: ALTCHOICE

## 2023-06-06 RX ORDER — MINERAL OIL
ENEMA (ML) RECTAL
COMMUNITY
Start: 2021-04-27 | End: 2023-10-09 | Stop reason: ALTCHOICE

## 2023-06-06 RX ORDER — FAMOTIDINE 20 MG/1
TABLET, FILM COATED ORAL EVERY 12 HOURS
COMMUNITY
Start: 2020-01-27 | End: 2023-10-09 | Stop reason: ALTCHOICE

## 2023-06-06 RX ORDER — PAROXETINE 10 MG/1
TABLET, FILM COATED ORAL
COMMUNITY
Start: 2022-07-22 | End: 2023-10-09 | Stop reason: ALTCHOICE

## 2023-06-06 NOTE — PROGRESS NOTES
"Subjective   Patient ID: Elvira Dunne is a 78 y.o. female who presents for Follow-up.    HPI   The patient is here for reevaluation of TRIGEMINAL NEURALGIA which seems to have responded to PREDNISONE, as well as GABAPENTIN and AMITRIPTYLINE.  Seen by ID, and recommended it seems to wean off prednisone.  In the meantime, patient very happy to state that symptoms are much better, and at the same time, perhaps in related fashion, she states that stress levels have been lower from domestic issues.    No headache, blurred vision, diplopia.  No dysphagia.  Has been in better spirits, and continues to want to stay healthy, independent, and productive.    Perhaps another eruption affecting the back of the patient.  Otherwise, no coughing, no sputum production.  CONSTITUTIONALLY, no fever, no chills.  No night sweats.  No lingering anorexia or nausea.  No apparent lymphadenopathy.  No apparent weight loss.    No rodney substernal chest pain.  No orthopnea, no paroxysmal nocturnal dyspnea.  Continues to want to stay healthy, independent, and productive, and continues to want to improve quality of life.          Review of Systems  Review of systems as in history of present illness, and otherwise, reviewed separately as well, and was unremarkable/negative/noncontributory.            Objective   /62 (BP Location: Left arm, Patient Position: Sitting, BP Cuff Size: Adult)   Pulse 80   Resp 20   Ht 1.6 m (5' 3\")   Wt 87.1 kg (192 lb)   SpO2 98%   BMI 34.01 kg/m²     Physical Exam  In very good spirits.  Minimally anxious, but otherwise, not in distress or diaphoresis.  Alert, oriented x3.  Amiable.  Not unkempt.  Receptive.  Cheerful.  Appropriate.  Eager to stay healthy, independent, and to improve quality of life.  Not wishing harm to self or others.    HEAD pink palpebral conjunctivae, anicteric sclerae.  NECK supple, no apparent jugular venous distention.  CARDIOVASCULAR not in distress or diaphoresis.  No bipedal " edema.  LUNGS not in distress or diaphoresis.  Not using accessory muscles.  ABDOMEN soft, nontender.  BACK no costovertebral angle tenderness.  EXTREMITIES no clubbing, no cyanosis.  SKIN with new vesicular rash/eruption.  NEURO no facial asymmetry.  No apparent cranial nerve deficits.  Ambulating without need of assistance.  No apparent focal weakness.  No tremors.  PSYCH receptive, appropriate, and eager to maintain and improve quality of life.          Assessment/Plan   Problem List Items Addressed This Visit       Trigeminal neuralgia    Shingles rash - Primary    Relevant Medications    predniSONE (Deltasone) 10 mg tablet    triamcinolone (Kenalog) 0.025 % ointment    Other Relevant Orders    Referral to Dermatology    Postherpetic neuralgia    Anxiety with depression        Thank you very much for coming.  I am very happy to see you.    I am also very happy to hear that you are doing much better with the pain affecting the left side of your face.    Unfortunately, you now have recurrence of a rash along your back.  I am not sure if this is still related to SHINGLES.  I will recommend that you see DERMATOLOGY for further evaluation.  They may take small amount of your skin and put it under the microscope to determine whether or not this is from shingles.    In the meantime, I will recommend that you continue taking PREDNISONE 10 mg by mouth with breakfast every morning.  Please continue taking this medication until you see dermatology.    Also, I have refilled your TRIAMCINOLONE, but as you have recommended, I will give you 2 small tubes instead of one big tube.    I will recommend that you continue taking your GABAPENTIN twice a day.  Do not discontinue this medication.    Since you have not been having any discomfort, you can slowly taper AMITRIPTYLINE off.  I will recommend that you take it every other day starting tonight, Tuesday, then Thursday, then Saturday, then Monday, then you can stop taking  amitriptyline.    Again, continue prednisone, gabapentin, restart triamcinolone, and wean yourself off amitriptyline.    Please come back in 2 months, so that I can see how you are doing.  Until then, please continue to take care of yourself and your family, and please continue to pray for our recovery from this pandemic.            0  Return in 2 months.  20 minutes please.  Reassess mood, energy, function.  Coordinate with ID, dermatology.  Review preventive strategies, cardiovascular risk.            0

## 2023-06-06 NOTE — PATIENT INSTRUCTIONS
Thank you very much for coming.  I am very happy to see you.    I am also very happy to hear that you are doing much better with the pain affecting the left side of your face.    Unfortunately, you now have recurrence of a rash along your back.  I am not sure if this is still related to SHINGLES.  I will recommend that you see DERMATOLOGY for further evaluation.  They may take small amount of your skin and put it under the microscope to determine whether or not this is from shingles.    In the meantime, I will recommend that you continue taking PREDNISONE 10 mg by mouth with breakfast every morning.  Please continue taking this medication until you see dermatology.    Also, I have refilled your TRIAMCINOLONE, but as you have recommended, I will give you 2 small tubes instead of one big tube.    I will recommend that you continue taking your GABAPENTIN twice a day.  Do not discontinue this medication.    Since you have not been having any discomfort, you can slowly taper AMITRIPTYLINE off.  I will recommend that you take it every other day starting tonight, Tuesday, then Thursday, then Saturday, then Monday, then you can stop taking amitriptyline.    Again, continue prednisone, gabapentin, restart triamcinolone, and wean yourself off amitriptyline.    Please come back in 2 months, so that I can see how you are doing.  Until then, please continue to take care of yourself and your family, and please continue to pray for our recovery from this pandemic.            0  Return in 2 months.  20 minutes please.  Reassess mood, energy, function.  Coordinate with ID, dermatology.  Review preventive strategies, cardiovascular risk.            0

## 2023-06-10 DIAGNOSIS — G50.0 TRIGEMINAL NEURALGIA: ICD-10-CM

## 2023-06-10 DIAGNOSIS — F41.8 ANXIETY WITH DEPRESSION: ICD-10-CM

## 2023-06-10 DIAGNOSIS — B02.29 POSTHERPETIC NEURALGIA: ICD-10-CM

## 2023-06-10 DIAGNOSIS — B02.8 HERPES ZOSTER WITH COMPLICATION: ICD-10-CM

## 2023-06-12 RX ORDER — AMITRIPTYLINE HYDROCHLORIDE 10 MG/1
10 TABLET, FILM COATED ORAL NIGHTLY
Qty: 30 TABLET | Refills: 1 | Status: SHIPPED | OUTPATIENT
Start: 2023-06-12 | End: 2023-08-16 | Stop reason: ALTCHOICE

## 2023-06-26 ENCOUNTER — TELEPHONE (OUTPATIENT)
Dept: PRIMARY CARE | Facility: CLINIC | Age: 78
End: 2023-06-26
Payer: MEDICARE

## 2023-07-20 LAB
ANION GAP IN SER/PLAS: 13 MMOL/L (ref 10–20)
APPEARANCE, URINE: CLEAR
BACTERIA, URINE: ABNORMAL /HPF
BILIRUBIN, URINE: NEGATIVE
BLOOD, URINE: NEGATIVE
CALCIDIOL (25 OH VITAMIN D3) (NG/ML) IN SER/PLAS: 49 NG/ML
CALCIUM (MG/DL) IN SER/PLAS: 9.5 MG/DL (ref 8.6–10.3)
CARBON DIOXIDE, TOTAL (MMOL/L) IN SER/PLAS: 26 MMOL/L (ref 21–32)
CHLORIDE (MMOL/L) IN SER/PLAS: 102 MMOL/L (ref 98–107)
COLOR, URINE: YELLOW
CREATININE (MG/DL) IN SER/PLAS: 0.69 MG/DL (ref 0.5–1.05)
ERYTHROCYTE DISTRIBUTION WIDTH (RATIO) BY AUTOMATED COUNT: 12.3 % (ref 11.5–14.5)
ERYTHROCYTE MEAN CORPUSCULAR HEMOGLOBIN CONCENTRATION (G/DL) BY AUTOMATED: 31.9 G/DL (ref 32–36)
ERYTHROCYTE MEAN CORPUSCULAR VOLUME (FL) BY AUTOMATED COUNT: 98 FL (ref 80–100)
ERYTHROCYTES (10*6/UL) IN BLOOD BY AUTOMATED COUNT: 4.14 X10E12/L (ref 4–5.2)
GFR FEMALE: 89 ML/MIN/1.73M2
GLUCOSE (MG/DL) IN SER/PLAS: 96 MG/DL (ref 74–99)
GLUCOSE, URINE: NEGATIVE MG/DL
HEMATOCRIT (%) IN BLOOD BY AUTOMATED COUNT: 40.7 % (ref 36–46)
HEMOGLOBIN (G/DL) IN BLOOD: 13 G/DL (ref 12–16)
KETONES, URINE: NEGATIVE MG/DL
LEUKOCYTE ESTERASE, URINE: ABNORMAL
LEUKOCYTES (10*3/UL) IN BLOOD BY AUTOMATED COUNT: 5.5 X10E9/L (ref 4.4–11.3)
NITRITE, URINE: NEGATIVE
PH, URINE: 6 (ref 5–8)
PLATELETS (10*3/UL) IN BLOOD AUTOMATED COUNT: 242 X10E9/L (ref 150–450)
POTASSIUM (MMOL/L) IN SER/PLAS: 3.9 MMOL/L (ref 3.5–5.3)
PROTEIN, URINE: NEGATIVE MG/DL
RBC, URINE: 2 /HPF (ref 0–5)
SODIUM (MMOL/L) IN SER/PLAS: 137 MMOL/L (ref 136–145)
SPECIFIC GRAVITY, URINE: 1.01 (ref 1–1.03)
UREA NITROGEN (MG/DL) IN SER/PLAS: 15 MG/DL (ref 6–23)
UROBILINOGEN, URINE: <2 MG/DL (ref 0–1.9)
WBC, URINE: 7 /HPF (ref 0–5)

## 2023-07-21 LAB
PARATHYRIN INTACT (PG/ML) IN SER/PLAS: 26.5 PG/ML (ref 18.5–88)
URINE CULTURE: ABNORMAL

## 2023-08-16 ENCOUNTER — OFFICE VISIT (OUTPATIENT)
Dept: PRIMARY CARE | Facility: CLINIC | Age: 78
End: 2023-08-16
Payer: MEDICARE

## 2023-08-16 VITALS
HEART RATE: 75 BPM | OXYGEN SATURATION: 95 % | SYSTOLIC BLOOD PRESSURE: 130 MMHG | WEIGHT: 195 LBS | HEIGHT: 63 IN | DIASTOLIC BLOOD PRESSURE: 79 MMHG | BODY MASS INDEX: 34.55 KG/M2

## 2023-08-16 DIAGNOSIS — I10 ESSENTIAL HYPERTENSION: Primary | ICD-10-CM

## 2023-08-16 DIAGNOSIS — Z91.89 FRAMINGHAM CARDIAC RISK >20% IN NEXT 10 YEARS: ICD-10-CM

## 2023-08-16 DIAGNOSIS — F41.8 ANXIETY WITH DEPRESSION: ICD-10-CM

## 2023-08-16 DIAGNOSIS — E78.00 PURE HYPERCHOLESTEROLEMIA: ICD-10-CM

## 2023-08-16 DIAGNOSIS — E66.09 CLASS 1 OBESITY DUE TO EXCESS CALORIES WITH SERIOUS COMORBIDITY AND BODY MASS INDEX (BMI) OF 34.0 TO 34.9 IN ADULT: ICD-10-CM

## 2023-08-16 DIAGNOSIS — R73.9 BORDERLINE HYPERGLYCEMIA: ICD-10-CM

## 2023-08-16 PROBLEM — E66.811 CLASS 1 OBESITY DUE TO EXCESS CALORIES WITH SERIOUS COMORBIDITY AND BODY MASS INDEX (BMI) OF 34.0 TO 34.9 IN ADULT: Status: ACTIVE | Noted: 2023-02-04

## 2023-08-16 PROBLEM — R03.0 BLOOD PRESSURE ELEVATED WITHOUT HISTORY OF HTN: Status: RESOLVED | Noted: 2023-02-04 | Resolved: 2023-08-16

## 2023-08-16 PROCEDURE — 3078F DIAST BP <80 MM HG: CPT | Performed by: INTERNAL MEDICINE

## 2023-08-16 PROCEDURE — 99213 OFFICE O/P EST LOW 20 MIN: CPT | Performed by: INTERNAL MEDICINE

## 2023-08-16 PROCEDURE — 1036F TOBACCO NON-USER: CPT | Performed by: INTERNAL MEDICINE

## 2023-08-16 PROCEDURE — 3075F SYST BP GE 130 - 139MM HG: CPT | Performed by: INTERNAL MEDICINE

## 2023-08-16 PROCEDURE — 1160F RVW MEDS BY RX/DR IN RCRD: CPT | Performed by: INTERNAL MEDICINE

## 2023-08-16 PROCEDURE — 1157F ADVNC CARE PLAN IN RCRD: CPT | Performed by: INTERNAL MEDICINE

## 2023-08-16 PROCEDURE — 1159F MED LIST DOCD IN RCRD: CPT | Performed by: INTERNAL MEDICINE

## 2023-08-16 PROCEDURE — 1126F AMNT PAIN NOTED NONE PRSNT: CPT | Performed by: INTERNAL MEDICINE

## 2023-08-16 NOTE — PROGRESS NOTES
"Subjective   Patient ID: Elvira Dunne is a 78 y.o. female who presents for 2 Month FU (Pt in today for routine 2 month FU).    HPI   Continues to try to manage with stress.  Following closely with CHRONIC PAIN, with relief of trigeminal neuralgia, some intermittent low back pain, some history of sciatica and paresthesia.  Currently, she states that with only TYLENOL EXTRA STRENGTH, she is managing better.  She states that IBUPROFEN actually made her paresthesia worse.    Currently, no headache, blurred vision, diplopia.  No dysphagia.  Ambulating without need of assistive device, with no focal weakness.  Anxious, but not depressive, and continues to want to improve quality of life.  Does not wish harm to self or others.    Physically active.  No rodney substernal chest pain.  No orthopnea, no paroxysmal nocturnal dyspnea.  Careful about exposure.  No coughing, no sputum production.  No overlying skin changes.  No dysuria, flank, suprapubic pain, following closely with UROLOGY, Dr. D'Amico.          Review of Systems  Review of systems as in history of present illness, and otherwise, reviewed separately as well, and was unremarkable/negative/noncontributory.          Objective   /79 (BP Location: Left arm, Patient Position: Sitting)   Pulse 75   Ht 1.6 m (5' 3\")   Wt 88.5 kg (195 lb)   SpO2 95%   BMI 34.54 kg/m²     Physical Exam  In better spirits.  Still anxious, but coping better.  Not in distress or diaphoresis.  Alert, oriented x3.  Amiable.  Not unkempt.  Receptive.  Cheerful.  Appropriate.  Eager to maintain and hopefully improve quality of life.  Does not wish harm to self or others.    HEAD pink palpebral conjunctivae, anicteric sclerae.  NECK supple, no apparent jugular venous distention.  CARDIOVASCULAR not in distress or diaphoresis.  No bipedal edema.  LUNGS not in distress or diaphoresis.  Not using accessory muscles.  ABDOMEN soft, nontender.  BACK no costovertebral angle " tenderness.  EXTREMITIES no clubbing, no cyanosis.  NEURO no facial asymmetry.  No apparent cranial nerve deficits.  Ambulating without need of assistance.  No apparent focal weakness.  No tremors.  PSYCH receptive, appropriate, and eager to maintain and improve quality of life.      LABORATORY examinations reviewed with patient.        Assessment/Plan   Problem List Items Addressed This Visit       Anxiety with depression    Borderline hyperglycemia    Relevant Orders    Comprehensive Metabolic Panel    Hemoglobin A1C    Class 1 obesity due to excess calories with serious comorbidity and body mass index (BMI) of 34.0 to 34.9 in adult    Essential hypertension - Primary    Relevant Orders    CBC and Auto Differential    Comprehensive Metabolic Panel    TSH with reflex to Free T4 if abnormal    [place] cardiac risk >20% in next 10 years    Pure hypercholesterolemia    Relevant Orders    Comprehensive Metabolic Panel    Lipid Panel        Thank you very much for coming.  I am very happy to see you again.    I do understand that you have been feeling better even with only TYLENOL extra strength.  Remember that Tylenol is safe to take with or without food, and can be taken safely with your other medications.  500 mg tablets, take 2 tablets every 6-8 hours as needed, up to 6 tablets in 24 hours.    If you have RESTLESS LEGS, especially at night, please let me know, and we can give you a trial of a specific medication, PRAMIPEXOLE.    In the meantime, please continue following with your chronic pain specialist, as well as your dermatologist.  Continue to follow with your urologist as well.    Please come back in 3 months.  It will be time for you to have repeat FASTING laboratory examinations which can be done here, followed by your Medicare Wellness evaluation.  Until then, please continue to take care of yourself and each other, and please continue to pray for our recovery from this pandemic.  Call sooner please as  needed.            0  Return in 3 months, after November 21 please, in order to allow patient to have her MEDICARE WELLNESS visit for the year.  40 minutes please.  FASTING laboratory examinations here a few days prior please.  Reassess mood, energy, function, preventive strategies, cardiovascular risk.  Coordinate with dermatology, ID, chronic pain, urology, other specialties, including ophthalmology.            0

## 2023-08-16 NOTE — PATIENT INSTRUCTIONS
Thank you very much for coming.  I am very happy to see you again.    I do understand that you have been feeling better even with only TYLENOL extra strength.  Remember that Tylenol is safe to take with or without food, and can be taken safely with your other medications.  500 mg tablets, take 2 tablets every 6-8 hours as needed, up to 6 tablets in 24 hours.    If you have RESTLESS LEGS, especially at night, please let me know, and we can give you a trial of a specific medication, PRAMIPEXOLE.    In the meantime, please continue following with your chronic pain specialist, as well as your dermatologist.  Continue to follow with your urologist as well.    Please come back in 3 months.  It will be time for you to have repeat FASTING laboratory examinations which can be done here, followed by your Medicare Wellness evaluation.  Until then, please continue to take care of yourself and each other, and please continue to pray for our recovery from this pandemic.  Call sooner please as needed.            0  Return in 3 months, after November 21 please, in order to allow patient to have her MEDICARE WELLNESS visit for the year.  40 minutes please.  FASTING laboratory examinations here a few days prior please.  Reassess mood, energy, function, preventive strategies, cardiovascular risk.  Coordinate with dermatology, ID, chronic pain, urology, other specialties, including ophthalmology.            0

## 2023-08-31 PROBLEM — R10.9 RIGHT FLANK PAIN: Status: ACTIVE | Noted: 2022-09-01

## 2023-08-31 PROBLEM — N31.8 FREQUENCY-URGENCY SYNDROME: Status: ACTIVE | Noted: 2021-02-01

## 2023-08-31 PROBLEM — M43.6 NECK STIFFNESS: Status: ACTIVE | Noted: 2023-08-31

## 2023-08-31 PROBLEM — N28.1 RENAL CYST, ACQUIRED: Status: ACTIVE | Noted: 2019-11-14

## 2023-08-31 PROBLEM — L71.9 ROSACEA, ACNE: Chronic | Status: ACTIVE | Noted: 2019-04-25

## 2023-08-31 PROBLEM — R35.0 INCREASED FREQUENCY OF URINATION: Status: ACTIVE | Noted: 2019-11-14

## 2023-08-31 PROBLEM — H40.9 GLAUCOMA OF BOTH EYES: Chronic | Status: ACTIVE | Noted: 2019-04-25

## 2023-09-11 ENCOUNTER — HOSPITAL ENCOUNTER (OUTPATIENT)
Dept: DATA CONVERSION | Facility: HOSPITAL | Age: 78
End: 2023-09-11
Attending: ANESTHESIOLOGY | Admitting: ANESTHESIOLOGY
Payer: MEDICARE

## 2023-09-11 DIAGNOSIS — M54.16 RADICULOPATHY, LUMBAR REGION: ICD-10-CM

## 2023-09-11 DIAGNOSIS — M51.16 INTERVERTEBRAL DISC DISORDERS WITH RADICULOPATHY, LUMBAR REGION: ICD-10-CM

## 2023-09-11 DIAGNOSIS — M51.36 OTHER INTERVERTEBRAL DISC DEGENERATION, LUMBAR REGION: ICD-10-CM

## 2023-09-11 DIAGNOSIS — M48.061 SPINAL STENOSIS, LUMBAR REGION WITHOUT NEUROGENIC CLAUDICATION: ICD-10-CM

## 2023-10-01 NOTE — OP NOTE
Post Operative Note:     PreOp Diagnosis: Spinal stenosis with severe degenerative  disc disease and lumbar radiculopathy   Post-Procedure Diagnosis: Same as preop   Procedure: 1.  L4-5 lumbar epidural steroid injection  under fluoroscopy  2.   3.   4.   5.   Surgeon: Clair Benntet MD   Resident/Fellow/Other Assistant: None   Anesthesia: Local   Estimated Blood Loss (mL): none   Specimen: no   Findings: L4-5 space     Operative Report Dictated:  Dictation: not applicable - note contains Operative  Report   Operative Report:    Mrs. Dunne is a pleasant 78 years old lady.  She has severe degenerative disc disease lumbar region with spinal stenosis and right lower extremity radiculopathy.   Conservative treatment failed to give her pain relief.  She had L4-5 lumbar epidural steroid injections the past and gave her over 6 months of pain relief over 70%.  The patient requested another injection for her pain since it was the only one that  is giving her pain relief.  I explained to the patient benefits and risks of the procedure the patient agreed to proceed.    Procedure description    The patient was taken to the operating room and placed on prone position.  All pressure points were protected.  Timeout and huddle was obtained.  Basic ASA monitors were applied.  The skin of the lower back was prepped and draped in the regular fashion  using a Betadine and Hibiclens.  Under fluoroscopic guidance the space between L4-L5 was identified which was too narrow.  It looks like there is severe degenerative changes there.  The x-ray tube was adjusted to visualize space between L4 and 5 clearly.   The skin and subcutaneous tissue were infiltrated using 1% preservative-free lidocaine.  Under fluoroscopic guidance, a Toughy needle was introduced between the the spinous processes using loss-of-resistance technique.  As it was described, there were  severe degenerative changes.  I was able to manipulate the needle carefully until  I was able to place the needle in the epidural space using loss of resistance technique.  2 cc of Omnipaque was injected which shows the spread in the epidural space of  both AP and lateral.  Injection of 10 mg of preservative-free Decadron was performed and the needle was flushed with 1 cc of 1% lidocaine preservative-free.  The needle was withdrawn intact.    Blood loss minimal to none    Complications none    The patient tolerated the procedure very well and was transferred to phase 2 recovery in stable condition.      Electronic Signatures:  Clair Bennett)  (Signed 11-Sep-2023 14:42)   Authored: Post Operative Note, Note Completion      Last Updated: 11-Sep-2023 14:42 by Clair Bennett)

## 2023-10-09 ENCOUNTER — HOSPITAL ENCOUNTER (OUTPATIENT)
Dept: GASTROENTEROLOGY | Facility: HOSPITAL | Age: 78
Discharge: HOME | End: 2023-10-09
Payer: MEDICARE

## 2023-10-09 ENCOUNTER — ANESTHESIA EVENT (OUTPATIENT)
Dept: GASTROENTEROLOGY | Facility: HOSPITAL | Age: 78
End: 2023-10-09
Payer: MEDICARE

## 2023-10-09 ENCOUNTER — APPOINTMENT (OUTPATIENT)
Dept: GASTROENTEROLOGY | Facility: HOSPITAL | Age: 78
End: 2023-10-09
Payer: MEDICARE

## 2023-10-09 ENCOUNTER — ANESTHESIA (OUTPATIENT)
Dept: GASTROENTEROLOGY | Facility: HOSPITAL | Age: 78
End: 2023-10-09
Payer: MEDICARE

## 2023-10-09 VITALS
TEMPERATURE: 97 F | RESPIRATION RATE: 16 BRPM | DIASTOLIC BLOOD PRESSURE: 71 MMHG | SYSTOLIC BLOOD PRESSURE: 132 MMHG | OXYGEN SATURATION: 98 % | HEART RATE: 78 BPM | HEIGHT: 63 IN | BODY MASS INDEX: 32.78 KG/M2 | WEIGHT: 185 LBS

## 2023-10-09 DIAGNOSIS — Z12.11 ENCOUNTER FOR SCREENING FOR MALIGNANT NEOPLASM OF COLON: ICD-10-CM

## 2023-10-09 DIAGNOSIS — Z12.11 ENCOUNTER FOR SCREENING FOR MALIGNANT NEOPLASM OF COLON: Primary | ICD-10-CM

## 2023-10-09 PROCEDURE — 88305 TISSUE EXAM BY PATHOLOGIST: CPT | Mod: TC

## 2023-10-09 PROCEDURE — 88305 TISSUE EXAM BY PATHOLOGIST: CPT | Mod: TC,SUR

## 2023-10-09 PROCEDURE — 45385 COLONOSCOPY W/LESION REMOVAL: CPT | Performed by: INTERNAL MEDICINE

## 2023-10-09 PROCEDURE — 2500000004 HC RX 250 GENERAL PHARMACY W/ HCPCS (ALT 636 FOR OP/ED): Performed by: NURSE ANESTHETIST, CERTIFIED REGISTERED

## 2023-10-09 PROCEDURE — 88305 TISSUE EXAM BY PATHOLOGIST: CPT | Performed by: PATHOLOGY

## 2023-10-09 PROCEDURE — A45378 PR COLONOSCOPY,DIAGNOSTIC: Performed by: NURSE ANESTHETIST, CERTIFIED REGISTERED

## 2023-10-09 PROCEDURE — 3700000002 HC GENERAL ANESTHESIA TIME - EACH INCREMENTAL 1 MINUTE

## 2023-10-09 PROCEDURE — 88305 TISSUE EXAM BY PATHOLOGIST: CPT | Mod: TC,59

## 2023-10-09 PROCEDURE — 99100 ANES PT EXTEME AGE<1 YR&>70: CPT | Performed by: ANESTHESIOLOGY

## 2023-10-09 PROCEDURE — 7100000010 HC PHASE TWO TIME - EACH INCREMENTAL 1 MINUTE

## 2023-10-09 PROCEDURE — 7100000009 HC PHASE TWO TIME - INITIAL BASE CHARGE

## 2023-10-09 PROCEDURE — 2500000005 HC RX 250 GENERAL PHARMACY W/O HCPCS: Performed by: NURSE ANESTHETIST, CERTIFIED REGISTERED

## 2023-10-09 PROCEDURE — 2580000001 HC RX 258 IV SOLUTIONS: Performed by: INTERNAL MEDICINE

## 2023-10-09 PROCEDURE — A45378 PR COLONOSCOPY,DIAGNOSTIC: Performed by: ANESTHESIOLOGY

## 2023-10-09 PROCEDURE — 3700000001 HC GENERAL ANESTHESIA TIME - INITIAL BASE CHARGE

## 2023-10-09 RX ORDER — PROPOFOL 10 MG/ML
INJECTION, EMULSION INTRAVENOUS CONTINUOUS PRN
Status: DISCONTINUED | OUTPATIENT
Start: 2023-10-09 | End: 2023-10-09

## 2023-10-09 RX ORDER — SODIUM CHLORIDE, SODIUM LACTATE, POTASSIUM CHLORIDE, CALCIUM CHLORIDE 600; 310; 30; 20 MG/100ML; MG/100ML; MG/100ML; MG/100ML
20 INJECTION, SOLUTION INTRAVENOUS CONTINUOUS
Status: DISCONTINUED | OUTPATIENT
Start: 2023-10-09 | End: 2023-10-20 | Stop reason: HOSPADM

## 2023-10-09 RX ORDER — LIDOCAINE HYDROCHLORIDE 20 MG/ML
INJECTION, SOLUTION EPIDURAL; INFILTRATION; INTRACAUDAL; PERINEURAL AS NEEDED
Status: DISCONTINUED | OUTPATIENT
Start: 2023-10-09 | End: 2023-10-09

## 2023-10-09 RX ORDER — TITANIUM DIOXIDE, OCTINOXATE, ZINC OXIDE 4.61; 1.6; .78 G/40ML; G/40ML; G/40ML
CREAM TOPICAL
COMMUNITY

## 2023-10-09 RX ORDER — MULTIVITAMIN
1 TABLET ORAL DAILY
COMMUNITY

## 2023-10-09 RX ORDER — LANOLIN ALCOHOL/MO/W.PET/CERES
100 CREAM (GRAM) TOPICAL DAILY
COMMUNITY

## 2023-10-09 RX ORDER — PROPOFOL 10 MG/ML
INJECTION, EMULSION INTRAVENOUS AS NEEDED
Status: DISCONTINUED | OUTPATIENT
Start: 2023-10-09 | End: 2023-10-09

## 2023-10-09 RX ADMIN — PROPOFOL 50 MG: 10 INJECTION, EMULSION INTRAVENOUS at 11:20

## 2023-10-09 RX ADMIN — SODIUM CHLORIDE, POTASSIUM CHLORIDE, SODIUM LACTATE AND CALCIUM CHLORIDE: 600; 310; 30; 20 INJECTION, SOLUTION INTRAVENOUS at 11:13

## 2023-10-09 RX ADMIN — PROPOFOL 15 MG: 10 INJECTION, EMULSION INTRAVENOUS at 11:30

## 2023-10-09 RX ADMIN — PROPOFOL 50 MG: 10 INJECTION, EMULSION INTRAVENOUS at 11:17

## 2023-10-09 RX ADMIN — PROPOFOL 50 MG: 10 INJECTION, EMULSION INTRAVENOUS at 11:27

## 2023-10-09 RX ADMIN — LIDOCAINE HYDROCHLORIDE 100 MG: 20 INJECTION, SOLUTION EPIDURAL; INFILTRATION; INTRACAUDAL; PERINEURAL at 11:17

## 2023-10-09 RX ADMIN — PROPOFOL 50 MG: 10 INJECTION, EMULSION INTRAVENOUS at 11:23

## 2023-10-09 RX ADMIN — PROPOFOL 200 MCG/KG/MIN: 10 INJECTION, EMULSION INTRAVENOUS at 11:17

## 2023-10-09 SDOH — HEALTH STABILITY: MENTAL HEALTH: CURRENT SMOKER: 0

## 2023-10-09 ASSESSMENT — PAIN SCALES - GENERAL
PAIN_LEVEL: 0
PAINLEVEL_OUTOF10: 0 - NO PAIN

## 2023-10-09 ASSESSMENT — COLUMBIA-SUICIDE SEVERITY RATING SCALE - C-SSRS
6. HAVE YOU EVER DONE ANYTHING, STARTED TO DO ANYTHING, OR PREPARED TO DO ANYTHING TO END YOUR LIFE?: NO
1. IN THE PAST MONTH, HAVE YOU WISHED YOU WERE DEAD OR WISHED YOU COULD GO TO SLEEP AND NOT WAKE UP?: NO
2. HAVE YOU ACTUALLY HAD ANY THOUGHTS OF KILLING YOURSELF?: NO

## 2023-10-09 ASSESSMENT — PAIN - FUNCTIONAL ASSESSMENT
PAIN_FUNCTIONAL_ASSESSMENT: 0-10

## 2023-10-09 NOTE — ANESTHESIA PREPROCEDURE EVALUATION
Patient: Elvira Dunne    Procedure Information       Anesthesia Start Date/Time: 10/09/23 1113    Scheduled providers: Anel Steiner MD    Procedure: COLONOSCOPY    Location: [facility] [place]            Relevant Problems   Anesthesia   (+) Corneal abrasion      Cardiovascular   (+) Acute right-sided thoracic back pain   (+) Essential hypertension   (+) Pure hypercholesterolemia      Endocrine   (+) Class 1 obesity due to excess calories with serious comorbidity and body mass index (BMI) of 34.0 to 34.9 in adult   (+) Obesity due to excess calories without serious comorbidity      GI   (+) GERD (gastroesophageal reflux disease)      /Renal   (+) Klebsiella cystitis   (+) Nephrolithiasis   (+) Recurrent urinary tract infection   (+) Renal cyst, acquired      Neuro/Psych   (+) Anxiety with depression   (+) Generalized anxiety disorder   (+) Trigeminal neuralgia      Musculoskeletal   (+) Degenerative arthritis of left knee   (+) Primary osteoarthritis of right knee      Eyes, Ears, Nose, and Throat   (+) Glaucoma   (+) Glaucoma of both eyes      Infectious Disease   (+) Acute bronchitis due to COVID-19 virus   (+) Herpes zoster without complication   (+) Influenza   (+) Klebsiella cystitis   (+) Onychomycosis of toenail   (+) Recurrent urinary tract infection   (+) Shingles rash       Clinical information reviewed:   Tobacco  Allergies  Meds   Med Hx  Surg Hx  OB Status  Fam Hx  Soc   Hx        NPO Detail:  NPO/Void Status  Date of Last Liquid: 10/09/23  Time of Last Liquid: 0400  Date of Last Solid: 10/07/23         Physical Exam    Airway  Mallampati: II  TM distance: >3 FB  Neck ROM: full     Cardiovascular   Rate: normal     Dental - normal exam     Pulmonary   Breath sounds clear to auscultation     Abdominal            Anesthesia Plan    ASA 2     MAC     The patient is not a current smoker.    Anesthetic plan and risks discussed with patient.    Plan discussed with CRNA.

## 2023-10-09 NOTE — ANESTHESIA POSTPROCEDURE EVALUATION
Patient: Elvira Dunne    Procedure Summary       Date: 10/09/23 Room / Location: [facility] - [place]    Anesthesia Start: 1113 Anesthesia Stop:     Procedure: COLONOSCOPY Diagnosis:       Encounter for screening for malignant neoplasm of colon      Encounter for screening for malignant neoplasm of colon    Scheduled Providers: Anel Steiner MD Responsible Provider: Zohaib Guidry MD    Anesthesia Type: MAC ASA Status: 2            Anesthesia Type: No value filed.    Vitals Value Taken Time   /90 10/09/23 1147   Temp 36.5 10/09/23 1147   Pulse 86 10/09/23 1147   Resp 18 10/09/23 1147   SpO2 93 10/09/23 1147       Anesthesia Post Evaluation    Patient location during evaluation: PACU  Patient participation: complete - patient participated  Level of consciousness: awake and alert  Pain score: 0  Pain management: adequate  There was medical reason for not using a multimodal analgesia pain management approach.Airway patency: patent  There was medical reason for not screening for obstructive sleep apnea and/or not using of two or more mitigation strategies.Cardiovascular status: acceptable  Respiratory status: acceptable  Hydration status: hypovolemic        No notable events documented.

## 2023-10-09 NOTE — DISCHARGE INSTRUCTIONS

## 2023-10-09 NOTE — H&P
"History Of Present Illness  Elvira Dunne is a 78 y.o. female presenting for surveillance colonoscopy, h/o colon adenomas.  Last colonoscopy .     Past Medical History  Past Medical History:   Diagnosis Date    Arthritis     Glaucoma     Nephrolithiasis     Personal history of other diseases of the musculoskeletal system and connective tissue 10/05/2020    History of arthritis    Personal history of other infectious and parasitic diseases 2016    History of herpes simplex infection    Personal history of pneumonia (recurrent) 2016    History of pneumonia    Unspecified visual loss 10/05/2020    Vision problems    Urinary tract infection     Viral meningitis, unspecified 2016    Viral meningitis       Surgical History  Past Surgical History:   Procedure Laterality Date     SECTION, CLASSIC  2016     Section     SECTION, LOW TRANSVERSE      CHOLECYSTECTOMY      COLONOSCOPY  2016    Complete Colonoscopy    GALLBLADDER SURGERY  2016    Gallbladder Surgery    JOINT REPLACEMENT      OTHER SURGICAL HISTORY  2016    Wrist Surgery    OTHER SURGICAL HISTORY  10/05/2020    Joint replacement procedure    TONSILLECTOMY  2016    Tonsillectomy        Social History  She reports that she has never smoked. She has never used smokeless tobacco. She reports that she does not use drugs. No history on file for alcohol use.    Family History  Family History   Problem Relation Name Age of Onset    Stroke Mother      COPD Father      Dementia Father          Allergies  Promethazine    Review of Systems     Physical Exam     Last Recorded Vitals  Blood pressure (!) 123/95, pulse 86, temperature 36.5 °C (97.7 °F), temperature source Temporal, resp. rate 18, height 1.6 m (5' 3\"), weight 83.9 kg (185 lb), SpO2 93 %.         Assessment/Plan   Surveillance colonoscopy.        I spent 5 minutes in the professional and overall care of this patient.      Anel Steiner, " MD

## 2023-10-12 LAB
LABORATORY COMMENT REPORT: NORMAL
PATH REPORT.FINAL DX SPEC: NORMAL
PATH REPORT.GROSS SPEC: NORMAL
PATH REPORT.TOTAL CANCER: NORMAL

## 2023-11-11 ENCOUNTER — HOSPITAL ENCOUNTER (EMERGENCY)
Facility: HOSPITAL | Age: 78
Discharge: HOME | End: 2023-11-11
Payer: MEDICARE

## 2023-11-11 VITALS
DIASTOLIC BLOOD PRESSURE: 79 MMHG | RESPIRATION RATE: 20 BRPM | BODY MASS INDEX: 32.96 KG/M2 | OXYGEN SATURATION: 97 % | TEMPERATURE: 98.2 F | WEIGHT: 186 LBS | SYSTOLIC BLOOD PRESSURE: 145 MMHG | HEART RATE: 95 BPM | HEIGHT: 63 IN

## 2023-11-11 DIAGNOSIS — N39.0 URINARY TRACT INFECTION WITHOUT HEMATURIA, SITE UNSPECIFIED: Primary | ICD-10-CM

## 2023-11-11 LAB
APPEARANCE UR: CLEAR
BACTERIA #/AREA URNS AUTO: ABNORMAL /HPF
BILIRUB UR STRIP.AUTO-MCNC: NEGATIVE MG/DL
COLOR UR: COLORLESS
GLUCOSE UR STRIP.AUTO-MCNC: NEGATIVE MG/DL
HOLD SPECIMEN: NORMAL
KETONES UR STRIP.AUTO-MCNC: NEGATIVE MG/DL
LEUKOCYTE ESTERASE UR QL STRIP.AUTO: ABNORMAL
NITRITE UR QL STRIP.AUTO: NEGATIVE
PH UR STRIP.AUTO: 6 [PH]
PROT UR STRIP.AUTO-MCNC: NEGATIVE MG/DL
RBC # UR STRIP.AUTO: NEGATIVE /UL
RBC #/AREA URNS AUTO: ABNORMAL /HPF
SP GR UR STRIP.AUTO: 1
UROBILINOGEN UR STRIP.AUTO-MCNC: <2 MG/DL
WBC #/AREA URNS AUTO: ABNORMAL /HPF

## 2023-11-11 PROCEDURE — 87086 URINE CULTURE/COLONY COUNT: CPT | Mod: ELYLAB | Performed by: PHYSICIAN ASSISTANT

## 2023-11-11 PROCEDURE — 99285 EMERGENCY DEPT VISIT HI MDM: CPT

## 2023-11-11 PROCEDURE — 99283 EMERGENCY DEPT VISIT LOW MDM: CPT

## 2023-11-11 PROCEDURE — 81001 URINALYSIS AUTO W/SCOPE: CPT | Performed by: PHYSICIAN ASSISTANT

## 2023-11-11 RX ORDER — PHENAZOPYRIDINE HYDROCHLORIDE 200 MG/1
200 TABLET, FILM COATED ORAL 3 TIMES DAILY
Qty: 6 TABLET | Refills: 0 | Status: SHIPPED | OUTPATIENT
Start: 2023-11-11 | End: 2023-11-13

## 2023-11-11 RX ORDER — CEPHALEXIN 500 MG/1
500 CAPSULE ORAL 3 TIMES DAILY
Qty: 30 CAPSULE | Refills: 0 | Status: SHIPPED | OUTPATIENT
Start: 2023-11-11 | End: 2023-11-22 | Stop reason: ALTCHOICE

## 2023-11-11 ASSESSMENT — COLUMBIA-SUICIDE SEVERITY RATING SCALE - C-SSRS
2. HAVE YOU ACTUALLY HAD ANY THOUGHTS OF KILLING YOURSELF?: NO
1. IN THE PAST MONTH, HAVE YOU WISHED YOU WERE DEAD OR WISHED YOU COULD GO TO SLEEP AND NOT WAKE UP?: NO
6. HAVE YOU EVER DONE ANYTHING, STARTED TO DO ANYTHING, OR PREPARED TO DO ANYTHING TO END YOUR LIFE?: NO

## 2023-11-11 ASSESSMENT — PAIN SCALES - GENERAL: PAINLEVEL_OUTOF10: 0 - NO PAIN

## 2023-11-11 ASSESSMENT — PAIN - FUNCTIONAL ASSESSMENT: PAIN_FUNCTIONAL_ASSESSMENT: 0-10

## 2023-11-11 NOTE — ED PROVIDER NOTES
HPI   Chief Complaint   Patient presents with    URI       A 78-year-old female patient comes in the emergency department today with concern for potential UTI.  States for the past couple days she has had urinary frequency, dysuria.  She states she is prone to urinary tract infections.  Says she has chills without fevers.  States she follows with Dr. D. Amico with urology for this.  She denies any flank pain.  For this purpose she comes in the emergency department today for further evaluation.                          No data recorded                Patient History   Past Medical History:   Diagnosis Date    Arthritis     Glaucoma     Nephrolithiasis     Personal history of other diseases of the musculoskeletal system and connective tissue 10/05/2020    History of arthritis    Personal history of other infectious and parasitic diseases 2016    History of herpes simplex infection    Personal history of pneumonia (recurrent) 2016    History of pneumonia    Unspecified visual loss 10/05/2020    Vision problems    Urinary tract infection     Viral meningitis, unspecified 2016    Viral meningitis     Past Surgical History:   Procedure Laterality Date     SECTION, CLASSIC  2016     Section     SECTION, LOW TRANSVERSE      CHOLECYSTECTOMY      COLONOSCOPY  2016    Complete Colonoscopy    GALLBLADDER SURGERY  2016    Gallbladder Surgery    JOINT REPLACEMENT      OTHER SURGICAL HISTORY  2016    Wrist Surgery    OTHER SURGICAL HISTORY  10/05/2020    Joint replacement procedure    TONSILLECTOMY  2016    Tonsillectomy     Family History   Problem Relation Name Age of Onset    Stroke Mother      COPD Father      Dementia Father       Social History     Tobacco Use    Smoking status: Never    Smokeless tobacco: Never   Vaping Use    Vaping Use: Not on file   Substance Use Topics    Alcohol use: Not on file     Comment: socially    Drug use: Never        Physical Exam   ED Triage Vitals [11/11/23 1445]   Temp Heart Rate Resp BP   36.8 °C (98.2 °F) 95 20 145/79      SpO2 Temp Source Heart Rate Source Patient Position   97 % Temporal -- --      BP Location FiO2 (%)     -- --       Physical Exam  Constitutional:       General: She is not in acute distress.     Appearance: Normal appearance. She is not ill-appearing.   HENT:      Head: Normocephalic and atraumatic.      Nose: Nose normal.   Eyes:      Extraocular Movements: Extraocular movements intact.      Conjunctiva/sclera: Conjunctivae normal.   Cardiovascular:      Rate and Rhythm: Normal rate and regular rhythm.   Pulmonary:      Effort: Pulmonary effort is normal. No respiratory distress.      Breath sounds: Normal breath sounds. No stridor. No wheezing.   Abdominal:      General: Abdomen is flat.      Palpations: Abdomen is soft.      Tenderness: There is no abdominal tenderness. There is no right CVA tenderness or left CVA tenderness.   Musculoskeletal:         General: Normal range of motion.      Cervical back: Normal range of motion.   Skin:     General: Skin is warm and dry.   Neurological:      General: No focal deficit present.      Mental Status: She is alert and oriented to person, place, and time. Mental status is at baseline.   Psychiatric:         Mood and Affect: Mood normal.       ED Course & MDM   Diagnoses as of 11/11/23 1647   Urinary tract infection without hematuria, site unspecified       Medical Decision Making  A 78-year-old female patient comes in the emergency department today with concern for potential UTI.  States for the past couple days she has had urinary frequency, dysuria.  She states she is prone to urinary tract infections.  Says she has chills without fevers.  States she follows with Dr. D. Amico with urology for this.  She denies any flank pain.  For this purpose she comes in the emergency department today for further evaluation.    Urinalysis ordered to rule out urinary  tract infection    Patient urine is positive for urinary tract infection.  Will place patient on p.o. Keflex.  Patient agrees with this plan expressed full verbal understanding.  All questions were answered.    Historian is the patient    Diagnosis: Urinary tract infection.      We will discharge patient home with p.o. Pyridium.      Labs Reviewed   URINALYSIS WITH REFLEX MICROSCOPIC AND CULTURE - Abnormal       Result Value    Color, Urine Colorless (*)     Appearance, Urine Clear      Specific Gravity, Urine 1.001 (*)     pH, Urine 6.0      Protein, Urine NEGATIVE      Glucose, Urine NEGATIVE      Blood, Urine NEGATIVE      Ketones, Urine NEGATIVE      Bilirubin, Urine NEGATIVE      Urobilinogen, Urine <2.0      Nitrite, Urine NEGATIVE      Leukocyte Esterase, Urine LARGE (3+) (*)    MICROSCOPIC ONLY, URINE - Abnormal    WBC, Urine 11-20 (*)     RBC, Urine 1-2      Bacteria, Urine 1+ (*)    URINE CULTURE   URINALYSIS WITH REFLEX MICROSCOPIC AND CULTURE    Narrative:     The following orders were created for panel order Urinalysis with Reflex Microscopic and Culture.  Procedure                               Abnormality         Status                     ---------                               -----------         ------                     Urinalysis with Reflex M...[[id number]]  Abnormal            Final result               Extra Urine Gray Tube[[id number]]                            In process                   Please view results for these tests on the individual orders.   EXTRA URINE GRAY TUBE        No orders to display         Procedure  Procedures     Livan Castillo PA-C  11/11/23 3102

## 2023-11-11 NOTE — Clinical Note
Elvira Dunne was seen and treated in our emergency department on 11/11/2023.  She may return to work on 11/14/2023.       If you have any questions or concerns, please don't hesitate to call.      Livan Castillo PA-C

## 2023-11-14 LAB — BACTERIA UR CULT: ABNORMAL

## 2023-11-15 ENCOUNTER — CLINICAL SUPPORT (OUTPATIENT)
Dept: PRIMARY CARE | Facility: CLINIC | Age: 78
End: 2023-11-15
Payer: MEDICARE

## 2023-11-15 ENCOUNTER — TELEPHONE (OUTPATIENT)
Dept: PHARMACY | Facility: HOSPITAL | Age: 78
End: 2023-11-15

## 2023-11-15 DIAGNOSIS — R73.9 BORDERLINE HYPERGLYCEMIA: ICD-10-CM

## 2023-11-15 DIAGNOSIS — I10 ESSENTIAL HYPERTENSION: ICD-10-CM

## 2023-11-15 DIAGNOSIS — E78.00 PURE HYPERCHOLESTEROLEMIA: ICD-10-CM

## 2023-11-15 LAB
ALBUMIN SERPL BCP-MCNC: 4.2 G/DL (ref 3.4–5)
ALP SERPL-CCNC: 112 U/L (ref 33–136)
ALT SERPL W P-5'-P-CCNC: 15 U/L (ref 7–45)
ANION GAP SERPL CALC-SCNC: 13 MMOL/L (ref 10–20)
AST SERPL W P-5'-P-CCNC: 16 U/L (ref 9–39)
BASOPHILS # BLD AUTO: 0.01 X10*3/UL (ref 0–0.1)
BASOPHILS NFR BLD AUTO: 0.3 %
BILIRUB SERPL-MCNC: 0.7 MG/DL (ref 0–1.2)
BUN SERPL-MCNC: 15 MG/DL (ref 6–23)
CALCIUM SERPL-MCNC: 9.1 MG/DL (ref 8.6–10.3)
CHLORIDE SERPL-SCNC: 106 MMOL/L (ref 98–107)
CHOLEST SERPL-MCNC: 214 MG/DL (ref 0–199)
CHOLESTEROL/HDL RATIO: 3.7
CO2 SERPL-SCNC: 28 MMOL/L (ref 21–32)
CREAT SERPL-MCNC: 0.61 MG/DL (ref 0.5–1.05)
EOSINOPHIL # BLD AUTO: 0.1 X10*3/UL (ref 0–0.4)
EOSINOPHIL NFR BLD AUTO: 2.6 %
ERYTHROCYTE [DISTWIDTH] IN BLOOD BY AUTOMATED COUNT: 12.7 % (ref 11.5–14.5)
EST. AVERAGE GLUCOSE BLD GHB EST-MCNC: 111 MG/DL
GFR SERPL CREATININE-BSD FRML MDRD: >90 ML/MIN/1.73M*2
GLUCOSE SERPL-MCNC: 101 MG/DL (ref 74–99)
HBA1C MFR BLD: 5.5 %
HCT VFR BLD AUTO: 40.4 % (ref 36–46)
HDLC SERPL-MCNC: 58.1 MG/DL
HGB BLD-MCNC: 13 G/DL (ref 12–16)
IMM GRANULOCYTES # BLD AUTO: 0.02 X10*3/UL (ref 0–0.5)
IMM GRANULOCYTES NFR BLD AUTO: 0.5 % (ref 0–0.9)
LDLC SERPL CALC-MCNC: 130 MG/DL
LYMPHOCYTES # BLD AUTO: 1.11 X10*3/UL (ref 0.8–3)
LYMPHOCYTES NFR BLD AUTO: 28.5 %
MCH RBC QN AUTO: 30.7 PG (ref 26–34)
MCHC RBC AUTO-ENTMCNC: 32.2 G/DL (ref 32–36)
MCV RBC AUTO: 95 FL (ref 80–100)
MONOCYTES # BLD AUTO: 0.47 X10*3/UL (ref 0.05–0.8)
MONOCYTES NFR BLD AUTO: 12.1 %
NEUTROPHILS # BLD AUTO: 2.19 X10*3/UL (ref 1.6–5.5)
NEUTROPHILS NFR BLD AUTO: 56 %
NON HDL CHOLESTEROL: 156 MG/DL (ref 0–149)
NRBC BLD-RTO: 0 /100 WBCS (ref 0–0)
PLATELET # BLD AUTO: 244 X10*3/UL (ref 150–450)
POTASSIUM SERPL-SCNC: 4.4 MMOL/L (ref 3.5–5.3)
PROT SERPL-MCNC: 6.6 G/DL (ref 6.4–8.2)
RBC # BLD AUTO: 4.24 X10*6/UL (ref 4–5.2)
SODIUM SERPL-SCNC: 143 MMOL/L (ref 136–145)
TRIGL SERPL-MCNC: 129 MG/DL (ref 0–149)
TSH SERPL-ACNC: 1.2 MIU/L (ref 0.44–3.98)
VLDL: 26 MG/DL (ref 0–40)
WBC # BLD AUTO: 3.9 X10*3/UL (ref 4.4–11.3)

## 2023-11-15 PROCEDURE — 84443 ASSAY THYROID STIM HORMONE: CPT

## 2023-11-15 PROCEDURE — 83036 HEMOGLOBIN GLYCOSYLATED A1C: CPT

## 2023-11-15 PROCEDURE — 85025 COMPLETE CBC W/AUTO DIFF WBC: CPT

## 2023-11-15 PROCEDURE — 36415 COLL VENOUS BLD VENIPUNCTURE: CPT

## 2023-11-15 PROCEDURE — 80061 LIPID PANEL: CPT

## 2023-11-15 PROCEDURE — 80053 COMPREHEN METABOLIC PANEL: CPT

## 2023-11-15 NOTE — PROGRESS NOTES
EDPD Note: Antibiotics Reviewed and Warranted    Contacted Mrs. Elvira Dunne regarding a positive Escherichia coli urine culture/result that was taken during their recent emergency room visit. I completed education with patient . The patient is being treated appropriately with cephalexin 500 m capsule TID x 10 days based on the the resulted culture. Patient reported having completed about 2 days of therapy, and is noticing some improvement to urinary symptoms, but no developments of systemic symptoms. Instructed patient to keep an eye on current symptoms, but after 7 days of therapy were completed, if urinary symptoms were fully resolved, antibiotic could be discontinued. Educated patient on antibiotic potential side effects (GI upset, diarrhea), and instructed to take with food to mitigate these issues. Encouraged patient to return to the ED or contact their PCP if symptoms worsen/change. Patient acknowledged understanding.      Susceptibility data from last 90 days.  Collected Specimen Info Organism Ampicillin Cefazolin Cefazolin (uncomplicated UTIs only) Ciprofloxacin Gentamicin Nitrofurantoin Piperacillin/Tazobactam Trimethoprim/Sulfamethoxazole   23 Urine from Clean Catch/Voided Escherichia coli S S S S S S S S        No further follow up needed from EDPD Team.     Margret Calderón, PharmD

## 2023-11-22 ENCOUNTER — OFFICE VISIT (OUTPATIENT)
Dept: PRIMARY CARE | Facility: CLINIC | Age: 78
End: 2023-11-22
Payer: MEDICARE

## 2023-11-22 VITALS — BODY MASS INDEX: 33.49 KG/M2 | WEIGHT: 189 LBS | HEIGHT: 63 IN

## 2023-11-22 DIAGNOSIS — Z53.20 STATIN DECLINED: ICD-10-CM

## 2023-11-22 DIAGNOSIS — N39.0 RECURRENT UTI (URINARY TRACT INFECTION): ICD-10-CM

## 2023-11-22 DIAGNOSIS — D72.819 LEUKOPENIA, UNSPECIFIED TYPE: ICD-10-CM

## 2023-11-22 DIAGNOSIS — E78.2 HYPERCHOLESTEROLEMIA WITH HYPERTRIGLYCERIDEMIA: ICD-10-CM

## 2023-11-22 DIAGNOSIS — I10 ESSENTIAL HYPERTENSION: ICD-10-CM

## 2023-11-22 DIAGNOSIS — Z91.89 FRAMINGHAM CARDIAC RISK >20% IN NEXT 10 YEARS: ICD-10-CM

## 2023-11-22 DIAGNOSIS — E66.09 CLASS 1 OBESITY DUE TO EXCESS CALORIES WITH SERIOUS COMORBIDITY AND BODY MASS INDEX (BMI) OF 33.0 TO 33.9 IN ADULT: ICD-10-CM

## 2023-11-22 DIAGNOSIS — Z00.00 ROUTINE GENERAL MEDICAL EXAMINATION AT HEALTH CARE FACILITY: Primary | ICD-10-CM

## 2023-11-22 DIAGNOSIS — R73.9 BORDERLINE HYPERGLYCEMIA: ICD-10-CM

## 2023-11-22 PROBLEM — E66.811 CLASS 1 OBESITY DUE TO EXCESS CALORIES WITH SERIOUS COMORBIDITY AND BODY MASS INDEX (BMI) OF 33.0 TO 33.9 IN ADULT: Status: ACTIVE | Noted: 2023-02-04

## 2023-11-22 PROBLEM — M35.3 POLYMYALGIA (MULTI): Status: RESOLVED | Noted: 2023-02-04 | Resolved: 2023-11-22

## 2023-11-22 PROCEDURE — 1170F FXNL STATUS ASSESSED: CPT | Performed by: INTERNAL MEDICINE

## 2023-11-22 PROCEDURE — G0439 PPPS, SUBSEQ VISIT: HCPCS | Performed by: INTERNAL MEDICINE

## 2023-11-22 PROCEDURE — 1160F RVW MEDS BY RX/DR IN RCRD: CPT | Performed by: INTERNAL MEDICINE

## 2023-11-22 PROCEDURE — 1123F ACP DISCUSS/DSCN MKR DOCD: CPT | Performed by: INTERNAL MEDICINE

## 2023-11-22 PROCEDURE — 1159F MED LIST DOCD IN RCRD: CPT | Performed by: INTERNAL MEDICINE

## 2023-11-22 PROCEDURE — 1036F TOBACCO NON-USER: CPT | Performed by: INTERNAL MEDICINE

## 2023-11-22 PROCEDURE — 1126F AMNT PAIN NOTED NONE PRSNT: CPT | Performed by: INTERNAL MEDICINE

## 2023-11-22 PROCEDURE — 99213 OFFICE O/P EST LOW 20 MIN: CPT | Performed by: INTERNAL MEDICINE

## 2023-11-22 ASSESSMENT — ENCOUNTER SYMPTOMS
DEPRESSION: 0
LOSS OF SENSATION IN FEET: 0
OCCASIONAL FEELINGS OF UNSTEADINESS: 0

## 2023-11-22 ASSESSMENT — ACTIVITIES OF DAILY LIVING (ADL)
DOING_HOUSEWORK: INDEPENDENT
MANAGING_FINANCES: INDEPENDENT
BATHING: INDEPENDENT
GROCERY_SHOPPING: INDEPENDENT
DRESSING: INDEPENDENT
TAKING_MEDICATION: INDEPENDENT

## 2023-11-22 ASSESSMENT — PATIENT HEALTH QUESTIONNAIRE - PHQ9
SUM OF ALL RESPONSES TO PHQ9 QUESTIONS 1 AND 2: 0
1. LITTLE INTEREST OR PLEASURE IN DOING THINGS: NOT AT ALL
2. FEELING DOWN, DEPRESSED OR HOPELESS: NOT AT ALL

## 2023-11-22 NOTE — PROGRESS NOTES
Subjective   Reason for Visit: Elvira Dunne is an 78 y.o. female here for a Medicare Wellness visit.     Past Medical, Surgical, and Family History reviewed and updated in chart.    Reviewed all medications by prescribing practitioner or clinical pharmacist (such as prescriptions, OTCs, herbal therapies and supplements) and documented in the medical record.    HPI  The patient is here for reevaluation of mood, energy, function, and review of preventive strategies, cardiovascular risk.  She is also due for Medicare Wellness evaluation for the year.  CODE STATUS and LIVING WILL wishes reviewed with the patient, see below.    In the meantime, she will seems to be coping much much better with stress, and has had even relief from somatic complaints.  Has been able to delegate work to other people.  Has been able to not always strive for perfection, it seems.  Still, has been able to be there for her daughter and her grandchildren, and even their dog.  Not seemingly sweating the small stuff, so to speak.  No headache, blurred vision, diplopia.  No dysphagia.  No focal weakness, ataxia, clumsiness.  No falls.  Not worried about memory.  No confusion or delirium.  Continues to want to stay healthy, independent, and productive, and does not wish harm to self or others.    Compliant with medications, tolerating regimens.  Staying physically active.  Eating sensibly.  No rodney substernal chest pain, no orthopnea, no paroxysmal nocturnal dyspnea.  No particular cough, no particular sputum production.  CONSTITUTIONALLY, no fever, no chills.  No night sweats.  No lingering anorexia or nausea.  No apparent lymphadenopathy.  No apparent weight loss.    Some nocturia, but alleviated by decreasing to sips of fluids after supper.  Otherwise, no dysuria, flank, suprapubic pain.  Admits that now that she is coughing for everyone, she has been less healthy perhaps in her diet.  Likewise, states that she has not been walking as much  "perhaps.  ENDOCRINE with no polyuria, polydipsia, polyphagia.  No blurred vision.  No skin, hair, nail changes.  No dramatic weight loss or weight gain.      CODE STATUS and LIVING WILL wishes reviewed, see below.    Patient Care Team:  Isiah Wheeler MD as PCP - General  Isiah Wheeler MD as PCP - [facility] Attributed Provider     Review of Systems  Review of systems as in history of present illness, and otherwise, reviewed separately as well, and was unremarkable/negative/noncontributory.          Objective   Vitals:  Ht 1.6 m (5' 3\")   Wt 85.7 kg (189 lb)   BMI 33.48 kg/m²       Physical Exam  Remarkably in very good spirits, and not anxious.  Not in distress or diaphoresis.  Alert, oriented x 3.  Amiable.  Not unkempt.  Receptive, cheerful, appropriate.  Moderately obese build, but remaining completely capable and independent.  Continues to want to improve quality of life, and does not wish harm to self or others.    HEAD pink palpebral conjunctivae, anicteric sclerae.  NECK supple, no apparent jugular venous distention.  CARDIOVASCULAR not in distress or diaphoresis.  No bipedal edema.  LUNGS not in distress or diaphoresis.  Not using accessory muscles.  ABDOMEN soft, nontender.  BACK no costovertebral angle tenderness.  EXTREMITIES no clubbing, no cyanosis.  NEURO no facial asymmetry.  No apparent cranial nerve deficits.  Romberg negative.  Ambulating without need of assistance.  No apparent focal weakness.  No tremors.  PSYCH receptive, appropriate, and eager to maintain and improve quality of life.      LABORATORY results reviewed with patient.        Assessment/Plan   Problem List Items Addressed This Visit       Borderline hyperglycemia    Relevant Orders    Follow Up In Primary Care - Established    Class 1 obesity due to excess calories with serious comorbidity and body mass index (BMI) of 33.0 to 33.9 in adult    Relevant Orders    Follow Up In Primary Care - Established    Essential " hypertension    Relevant Orders    Follow Up In Primary Care - Established    Ozone cardiac risk >20% in next 10 years    Overview     26.5% August 2023         Relevant Orders    Follow Up In Primary Care - Established    Hypercholesterolemia with hypertriglyceridemia    Relevant Orders    Follow Up In Primary Care - Established    Leukopenia    Relevant Orders    Follow Up In Primary Care - Established    Recurrent UTI (urinary tract infection)    Relevant Orders    Follow Up In Primary Care - Established    Statin declined    Relevant Orders    Follow Up In Primary Care - Established     Other Visit Diagnoses       Routine general medical examination at health care facility    -  Primary               Thank very much for coming.  I am very happy to see you.    I have to say that you are coping well with stress!  Now, you have to start taking care of yourself again.  We did your Medicare Wellness evaluation for the year, and you seem to be doing well.  Again, you seem to be coping better with stressors.    You will benefit from your COVID vaccination/booster when it is time to get it done.  Please do not forget.    You also have realized that you need to step up your EXERCISE regimens again.  This is very important and very helpful, and will determine whether or not you will need more medicine when I see you again in February.  Likewise, you have to eat more sensibly.  You have done it before.  You can do it again.  [place] diet book, DASH diet for ideas.    You also have to finish up your antibiotics, and you have to continue to drink lots of fluids, and urinate often during the daytime.  When it is after supper, please decrease your fluids to only SIPS of water, so that you do not have to urinate in the middle of the night.  You have done it before, and you can do it again!    I am very confident that you will continue to improve.  Let us see how you do over the next 3 months.  Let me see you in  FEBRUARY, your birth month, for your yearly COMPLETE physical examination.  Please keep your appointment with GYNECOLOGY as well.    Again, thank you very much for coming.  Please do not hesitate to call with questions or concerns.  Please continue to take care of yourself and your family, and please continue to pray for our recovery from this pandemic.    Remember, you have to prove to yourself that you can maximize diet and exercise, and that you can manage your weight, in order for you to not have to take any medication for cholesterol.  Please call me sooner with any questions or concerns.  I hope you have a good Thanksgiving, a good [place], and a happy new year!            0  Return in 3 months.  40 minutes please.  COMPLETE physical examination.  Reassess mood, energy, function, preventive strategies, cardiovascular risk, diet and exercise efforts.  Offer STATIN once more, especially if appropriate.  Consider options regarding weight management.            0

## 2023-11-22 NOTE — PATIENT INSTRUCTIONS
Thank very much for coming.  I am very happy to see you.    I have to say that you are coping well with stress!  Now, you have to start taking care of yourself again.  We did your Medicare Wellness evaluation for the year, and you seem to be doing well.  Again, you seem to be coping better with stressors.    You will benefit from your COVID vaccination/booster when it is time to get it done.  Please do not forget.    You also have realized that you need to step up your EXERCISE regimens again.  This is very important and very helpful, and will determine whether or not you will need more medicine when I see you again in February.  Likewise, you have to eat more sensibly.  You have done it before.  You can do it again.  [place] diet book, DASH diet for ideas.    You also have to finish up your antibiotics, and you have to continue to drink lots of fluids, and urinate often during the daytime.  When it is after supper, please decrease your fluids to only SIPS of water, so that you do not have to urinate in the middle of the night.  You have done it before, and you can do it again!    I am very confident that you will continue to improve.  Let us see how you do over the next 3 months.  Let me see you in FEBRUARY, your birth month, for your yearly COMPLETE physical examination.  Please keep your appointment with GYNECOLOGY as well.    Again, thank you very much for coming.  Please do not hesitate to call with questions or concerns.  Please continue to take care of yourself and your family, and please continue to pray for our recovery from this pandemic.    Remember, you have to prove to yourself that you can maximize diet and exercise, and that you can manage your weight, in order for you to not have to take any medication for cholesterol.  Please call me sooner with any questions or concerns.  I hope you have a good Thanksgiving, a good [place], and a happy new year!            0  Return in 3 months.  40 minutes  please.  COMPLETE physical examination.  Reassess mood, energy, function, preventive strategies, cardiovascular risk, diet and exercise efforts.  Offer STATIN once more, especially if appropriate.  Consider options regarding weight management.            0

## 2023-11-24 ENCOUNTER — TELEPHONE (OUTPATIENT)
Dept: PRIMARY CARE | Facility: CLINIC | Age: 78
End: 2023-11-24
Payer: MEDICARE

## 2023-11-24 DIAGNOSIS — N39.0 RECURRENT UTI (URINARY TRACT INFECTION): Primary | ICD-10-CM

## 2023-11-24 RX ORDER — CIPROFLOXACIN 500 MG/1
500 TABLET ORAL 2 TIMES DAILY
Qty: 14 TABLET | Refills: 0 | Status: SHIPPED | OUTPATIENT
Start: 2023-11-24 | End: 2023-12-01

## 2023-12-06 DIAGNOSIS — Z86.19 HISTORY OF HERPES SIMPLEX TYPE 2 INFECTION: Primary | ICD-10-CM

## 2023-12-06 RX ORDER — VALACYCLOVIR HYDROCHLORIDE 500 MG/1
500 TABLET, FILM COATED ORAL 2 TIMES DAILY
COMMUNITY
End: 2023-12-06 | Stop reason: SDUPTHER

## 2023-12-07 RX ORDER — VALACYCLOVIR HYDROCHLORIDE 500 MG/1
500 TABLET, FILM COATED ORAL 2 TIMES DAILY
Qty: 180 TABLET | Refills: 0 | Status: SHIPPED | OUTPATIENT
Start: 2023-12-07 | End: 2024-03-04

## 2024-01-02 ENCOUNTER — OFFICE VISIT (OUTPATIENT)
Dept: OBGYN CLINIC | Age: 79
End: 2024-01-02
Payer: MEDICARE

## 2024-01-02 VITALS
WEIGHT: 189 LBS | DIASTOLIC BLOOD PRESSURE: 72 MMHG | SYSTOLIC BLOOD PRESSURE: 114 MMHG | BODY MASS INDEX: 33.49 KG/M2 | HEIGHT: 63 IN

## 2024-01-02 DIAGNOSIS — Z01.419 ENCOUNTER FOR WELL WOMAN EXAM WITH ROUTINE GYNECOLOGICAL EXAM: Primary | ICD-10-CM

## 2024-01-02 DIAGNOSIS — Z12.31 SCREENING MAMMOGRAM FOR BREAST CANCER: ICD-10-CM

## 2024-01-02 PROCEDURE — G0101 CA SCREEN;PELVIC/BREAST EXAM: HCPCS | Performed by: OBSTETRICS & GYNECOLOGY

## 2024-01-02 PROCEDURE — G8484 FLU IMMUNIZE NO ADMIN: HCPCS | Performed by: OBSTETRICS & GYNECOLOGY

## 2024-01-02 ASSESSMENT — ENCOUNTER SYMPTOMS
CONSTIPATION: 0
VOMITING: 0
ABDOMINAL PAIN: 0
RESPIRATORY NEGATIVE: 1
ALLERGIC/IMMUNOLOGIC NEGATIVE: 1
EYES NEGATIVE: 1
BLOOD IN STOOL: 0
NAUSEA: 0
RECTAL PAIN: 0
DIARRHEA: 0
ANAL BLEEDING: 0
ABDOMINAL DISTENTION: 0

## 2024-01-02 ASSESSMENT — PATIENT HEALTH QUESTIONNAIRE - PHQ9
2. FEELING DOWN, DEPRESSED OR HOPELESS: 0
SUM OF ALL RESPONSES TO PHQ QUESTIONS 1-9: 0
1. LITTLE INTEREST OR PLEASURE IN DOING THINGS: 0
SUM OF ALL RESPONSES TO PHQ QUESTIONS 1-9: 0
SUM OF ALL RESPONSES TO PHQ9 QUESTIONS 1 & 2: 0

## 2024-01-02 ASSESSMENT — VISUAL ACUITY: OU: 1

## 2024-01-02 NOTE — PROGRESS NOTES
The patient was asked if she would like a chaperone present for her intimate exam. She  Declined the chaperone. Femi Mars CMA (AAMA)    
This Encounter   Procedures    [facility] JAS DIGITAL SCREEN BILATERAL     Standing Status:   Future     Standing Expiration Date:   3/2/2025         Follow up:  Return in about 1 year (around 1/2/2025) for Annual.  Repeat Annual GYN exam every 1 year.  Cervical Cytology exam starts age 21.   If Negative Cytology;  follow-up screening per current guidelines.   Mammograms yearly starting at age 40.    Calcium and Vitamin D dosing reviewed ( age appropriate ).    Colonoscopy and bone density screening discussed ( age appropriate ).    Birth control and STD prevention discussed ( age appropriate ).    Gardisil counseling completed for all patients ( age appropriate ).    Routine health maintenance ( per PCP and guidelines ).

## 2024-01-08 ENCOUNTER — TELEPHONE (OUTPATIENT)
Dept: PRIMARY CARE | Facility: CLINIC | Age: 79
End: 2024-01-08
Payer: MEDICARE

## 2024-01-10 ENCOUNTER — OFFICE VISIT (OUTPATIENT)
Dept: PRIMARY CARE | Facility: CLINIC | Age: 79
End: 2024-01-10
Payer: MEDICARE

## 2024-01-10 VITALS
BODY MASS INDEX: 33.68 KG/M2 | SYSTOLIC BLOOD PRESSURE: 128 MMHG | HEART RATE: 65 BPM | DIASTOLIC BLOOD PRESSURE: 74 MMHG | WEIGHT: 190.1 LBS | RESPIRATION RATE: 18 BRPM | HEIGHT: 63 IN | OXYGEN SATURATION: 95 %

## 2024-01-10 DIAGNOSIS — L30.9 ECZEMA, UNSPECIFIED TYPE: ICD-10-CM

## 2024-01-10 DIAGNOSIS — R73.9 BORDERLINE HYPERGLYCEMIA: ICD-10-CM

## 2024-01-10 DIAGNOSIS — G50.0 TRIGEMINAL NEURALGIA: Primary | ICD-10-CM

## 2024-01-10 DIAGNOSIS — E55.9 VITAMIN D DEFICIENCY: ICD-10-CM

## 2024-01-10 DIAGNOSIS — E53.8 VITAMIN B12 DEFICIENCY: ICD-10-CM

## 2024-01-10 DIAGNOSIS — B02.29 POSTHERPETIC NEURALGIA: ICD-10-CM

## 2024-01-10 PROCEDURE — 99213 OFFICE O/P EST LOW 20 MIN: CPT | Performed by: INTERNAL MEDICINE

## 2024-01-10 PROCEDURE — 1126F AMNT PAIN NOTED NONE PRSNT: CPT | Performed by: INTERNAL MEDICINE

## 2024-01-10 PROCEDURE — 1036F TOBACCO NON-USER: CPT | Performed by: INTERNAL MEDICINE

## 2024-01-10 PROCEDURE — 3078F DIAST BP <80 MM HG: CPT | Performed by: INTERNAL MEDICINE

## 2024-01-10 PROCEDURE — 1159F MED LIST DOCD IN RCRD: CPT | Performed by: INTERNAL MEDICINE

## 2024-01-10 PROCEDURE — 3074F SYST BP LT 130 MM HG: CPT | Performed by: INTERNAL MEDICINE

## 2024-01-10 RX ORDER — CARBAMAZEPINE 100 MG/1
100 TABLET, EXTENDED RELEASE ORAL 2 TIMES DAILY
Qty: 60 TABLET | Refills: 0 | Status: SHIPPED | OUTPATIENT
Start: 2024-01-10 | End: 2024-02-05 | Stop reason: SDUPTHER

## 2024-01-10 NOTE — PROGRESS NOTES
"Pain on left side of head    Subjective   Patient ID: Elvira Dunne is a 78 y.o. female who presents for Headache.    HPI   Unlike previous attacks of TRIGEMINAL NEURALGIA, left-sided face, the patient only had 1 episode this time around.  She is not on any medication, because she states that the GABAPENTIN made her excessively drowsy during the daytime, \"to the point that I was sweating myself.\"  Currently, no dysuria, flank, suprapubic pain, and no incontinence.  No urgency.    Likewise, currently, no headache.  Blurred vision, diplopia.  No dysphagia.    She does have what appears to be an ECZEMATOUS RASH along the back, midline, with some radiation to the left side.  This rash does not have any burning or itching at this time.  Seen by DERMATOLOGY, with biopsy done, patient to return for further recommendations.    Otherwise, no odynophagia or dysphagia.  No wheezing or shortness of breath.  No rodney substernal chest pain, no orthopnea, no paroxysmal nocturnal dyspnea.  No other skin changes, no easy bruisability.  Appetite preserved, no abdominal distress.  ENDOCRINE with no polyuria, polydipsia, polyphagia.  No blurred vision.  No skin, hair, nail changes.  No dramatic weight loss or weight gain.      No particular cough, no particular sputum production.  CONSTITUTIONALLY, no fever, no chills.  No night sweats.  No lingering anorexia or nausea.  No apparent lymphadenopathy.  No apparent weight loss.        Review of Systems  Review of systems as in history of present illness, and otherwise, reviewed separately as well, and was unremarkable/negative/noncontributory.        Objective   /74 (BP Location: Left arm, Patient Position: Sitting, BP Cuff Size: Adult)   Pulse 65   Resp 18   Ht 1.6 m (5' 3\")   Wt 86.2 kg (190 lb 1.6 oz)   SpO2 95%   BMI 33.67 kg/m²     Physical Exam  In otherwise good spirits.  Not in distress or diaphoresis.  Alert, oriented x 3.  Amiable.  Not unkempt.  Receptive, " cheerful, appropriate.  Eager to get better.  Not wishing harm to self or others.  Appropriate.    HEAD pink palpebral conjunctivae, anicteric sclerae.  NECK supple, no apparent jugular venous distention.  CARDIOVASCULAR not in distress or diaphoresis.  No bipedal edema.  LUNGS not in distress or diaphoresis.  Not using accessory muscles.  ABDOMEN soft, nontender.  BACK no costovertebral angle tenderness.  EXTREMITIES no clubbing, no cyanosis.  SKIN with small eczematous weeping rash, midline, with extension left of center.  Only minimal induration noted.  NEURO no facial asymmetry.  No apparent cranial nerve deficits.  Ambulating without need of assistance.  No apparent focal weakness.  No tremors.  PSYCH receptive, appropriate, and eager to maintain and improve quality of life.      LABORATORY results reviewed.      Assessment/Plan   Diagnoses and all orders for this visit:  Trigeminal neuralgia  -     carBAMazepine XR (Tegretol XR) 100 mg 12 hr tablet; Take 1 tablet (100 mg) by mouth 2 times a day. Do not crush, chew, or split.  Vitamin B12 deficiency  Postherpetic neuralgia  -     carBAMazepine XR (Tegretol XR) 100 mg 12 hr tablet; Take 1 tablet (100 mg) by mouth 2 times a day. Do not crush, chew, or split.  Borderline hyperglycemia  Vitamin D deficiency  Eczema, unspecified type       Thank you very much for coming.  I am very happy to see you.    You did have another attack of your TRIGEMINAL NEURALGIA, but it is not persistent.  You do not need steroids at this time.    In the past, you are unable to take GABAPENTIN without getting excessively sleepy.  We will not try that medication.    Instead, you will try a new medication, CARBAMAZEPINE 100 mg.  Take this with SUPPER every evening.  Watch out for excessive sleepiness.  PLEASE NOTE that the bottle of your carbamazepine will say to take it twice a day.  I am telling you to take it only with supper, because you are very sensitive to medications.  Take once a  day only until I see you again please.  Take with supper.  Watch out for excessive sleepiness.    Carbamazepine can go up to 200 mg by mouth twice a day, but we are starting a low-dose only, because carbamazepine can also make you sleepy.    Please keep your appointment in February, but call sooner as needed.    Again, I am very happy to see you.  Please continue to take care of yourself and your family, and please continue to pray for our recovery from this pandemic.    Very important, please get in touch with DERMATOLOGY, and have them reevaluate your back, because the RASH did come back.  It will be nice to know what the BIOPSY showed that they did last time.    I hope you had a good [place], and I do wish you a happy new year!  Please keep your appointment in February.  Call sooner please as needed.            0

## 2024-01-10 NOTE — PATIENT INSTRUCTIONS
Thank you very much for coming.  I am very happy to see you.    You did have another attack of your TRIGEMINAL NEURALGIA, but it is not persistent.  You do not need steroids at this time.    In the past, you are unable to take GABAPENTIN without getting excessively sleepy.  We will not try that medication.    Instead, you will try a new medication, CARBAMAZEPINE 100 mg.  Take this with SUPPER every evening.  Watch out for excessive sleepiness.    Carbamazepine can go up to 200 mg by mouth twice a day, but we are starting a low-dose only, because carbamazepine can also make you sleepy.    Please keep your appointment in February, but call sooner as needed.    Again, I am very happy to see you.  Please continue to take care of yourself and your family, and please continue to pray for our recovery from this pandemic.    Very important, please get in touch with DERMATOLOGY, and have them reevaluate your back, because the RASH did come back.  It will be nice to know what the BIOPSY showed that they did last time.    I hope you had a good [place], and I do wish you a happy new year!  Please keep your appointment in February.  Call sooner please as needed.            0

## 2024-02-05 ENCOUNTER — TELEPHONE (OUTPATIENT)
Dept: PRIMARY CARE | Facility: CLINIC | Age: 79
End: 2024-02-05
Payer: MEDICARE

## 2024-02-05 DIAGNOSIS — G50.0 TRIGEMINAL NEURALGIA: ICD-10-CM

## 2024-02-05 DIAGNOSIS — B02.29 POSTHERPETIC NEURALGIA: ICD-10-CM

## 2024-02-05 RX ORDER — CARBAMAZEPINE 100 MG/1
100 TABLET, EXTENDED RELEASE ORAL 2 TIMES DAILY
Qty: 60 TABLET | Refills: 0 | Status: SHIPPED | OUTPATIENT
Start: 2024-02-05 | End: 2024-04-22 | Stop reason: WASHOUT

## 2024-02-21 ENCOUNTER — APPOINTMENT (OUTPATIENT)
Dept: PRIMARY CARE | Facility: CLINIC | Age: 79
End: 2024-02-21
Payer: MEDICARE

## 2024-03-03 DIAGNOSIS — Z86.19 HISTORY OF HERPES SIMPLEX TYPE 2 INFECTION: ICD-10-CM

## 2024-03-04 ENCOUNTER — OFFICE VISIT (OUTPATIENT)
Dept: PRIMARY CARE | Facility: CLINIC | Age: 79
End: 2024-03-04
Payer: MEDICARE

## 2024-03-04 VITALS
SYSTOLIC BLOOD PRESSURE: 132 MMHG | RESPIRATION RATE: 20 BRPM | BODY MASS INDEX: 34.02 KG/M2 | WEIGHT: 192 LBS | OXYGEN SATURATION: 94 % | HEART RATE: 73 BPM | HEIGHT: 63 IN | DIASTOLIC BLOOD PRESSURE: 80 MMHG

## 2024-03-04 DIAGNOSIS — R63.5 WEIGHT GAIN: Primary | ICD-10-CM

## 2024-03-04 DIAGNOSIS — I10 ESSENTIAL HYPERTENSION: ICD-10-CM

## 2024-03-04 DIAGNOSIS — R73.9 BORDERLINE HYPERGLYCEMIA: ICD-10-CM

## 2024-03-04 DIAGNOSIS — E55.9 VITAMIN D DEFICIENCY: ICD-10-CM

## 2024-03-04 DIAGNOSIS — G50.0 TRIGEMINAL NEURALGIA: ICD-10-CM

## 2024-03-04 DIAGNOSIS — E66.09 CLASS 1 OBESITY DUE TO EXCESS CALORIES WITH SERIOUS COMORBIDITY AND BODY MASS INDEX (BMI) OF 34.0 TO 34.9 IN ADULT: ICD-10-CM

## 2024-03-04 DIAGNOSIS — E78.2 HYPERCHOLESTEROLEMIA WITH HYPERTRIGLYCERIDEMIA: ICD-10-CM

## 2024-03-04 DIAGNOSIS — Z91.89 FRAMINGHAM CARDIAC RISK >20% IN NEXT 10 YEARS: ICD-10-CM

## 2024-03-04 PROCEDURE — 1036F TOBACCO NON-USER: CPT | Performed by: INTERNAL MEDICINE

## 2024-03-04 PROCEDURE — 99213 OFFICE O/P EST LOW 20 MIN: CPT | Performed by: INTERNAL MEDICINE

## 2024-03-04 PROCEDURE — 3079F DIAST BP 80-89 MM HG: CPT | Performed by: INTERNAL MEDICINE

## 2024-03-04 PROCEDURE — 3075F SYST BP GE 130 - 139MM HG: CPT | Performed by: INTERNAL MEDICINE

## 2024-03-04 PROCEDURE — 1157F ADVNC CARE PLAN IN RCRD: CPT | Performed by: INTERNAL MEDICINE

## 2024-03-04 PROCEDURE — 1159F MED LIST DOCD IN RCRD: CPT | Performed by: INTERNAL MEDICINE

## 2024-03-04 PROCEDURE — 1160F RVW MEDS BY RX/DR IN RCRD: CPT | Performed by: INTERNAL MEDICINE

## 2024-03-04 PROCEDURE — 1126F AMNT PAIN NOTED NONE PRSNT: CPT | Performed by: INTERNAL MEDICINE

## 2024-03-04 RX ORDER — VALACYCLOVIR HYDROCHLORIDE 500 MG/1
500 TABLET, FILM COATED ORAL 2 TIMES DAILY
Qty: 180 TABLET | Refills: 0 | Status: SHIPPED | OUTPATIENT
Start: 2024-03-04

## 2024-03-04 NOTE — PATIENT INSTRUCTIONS
Thank you very much for coming.  It is always nice to see you, especially since you have tried so hard to take care of your family!  I want to help you as well.    I do understand that you are a little frustrated about WEIGHT GAIN.  You have to take time to take care of yourself.  You are no good anybody if you are not feeling sick or tired.    Lots of rest and sleep.  Remember, when you are sleeping, that is when your metabolism kicks in to help you lose weight.    Sensible Diet please.  [place] diet book, DASH diet for ideas.  Let me know if you would like to see a DIETITIAN.    I am glad to hear that you like WALKING.  Brisk walking is one of the best exercises that you can do not only for weight management, but for energy!  I am glad that you already know this.  I am glad that you have access to the park.  At least 10 minutes of brisk walking in the morning, and at least 10 minutes of brisk walking in the afternoon, every day, for a minimum of 20 minutes total every day, even Sundays!    As you have noted, when you do exercises like brisk walking, or cycling, or swimming, all of these actually give you more energy!    In the meantime, I do understand that you have not had any trouble with your HEADACHE.  Do not stop all Carbamazepine cold turkey.  Instead, take your CARBAMAZEPINE with SUPPER every evening until all gone.    Please come back in 4 months.  This will be enough time for you to try to figure out what to do with your weight.  Afterwards, when you return, we will see your success, and we will figure out other means to help you continue to lose weight.    Please do some FASTING laboratory examinations in 4 months.  Any  facility.  Afterwards, please come back so that we can figure out how you are doing and how to keep you as strong and energetic and productive as you can be.    Until then, please continue to take care of yourself and your family, and please continue to pray for our recovery from  this pandemic.  I hope you are having a blessed Lent and I do wish you a happy Easter!            0  Return in 4 months.  20 minutes please.  FASTING laboratory examinations any  facility, then see me for reevaluation of hemodynamics, cardiovascular risk, weight management, preventive strategies, mood, energy, function.  Reassess for recurrence of trigeminal neuralgia.            0

## 2024-03-04 NOTE — PROGRESS NOTES
"Subjective   Patient ID: Elvira Dunne is a 79 y.o. female who presents for Follow-up.    HPI   Patient states no recurrence of trigeminal neuralgia since last she was here, and in the meantime, she would like to quit taking CARBAMAZEPINE if possible.  No confusion or delirium.  No headache, blurred vision, diplopia.  No dysphagia.  No focal weakness, ataxia, clumsiness.  No falls.  Not worried about memory.  No excessive sleepiness or fatigue.  Continues to want to improve quality of life, and does not wish harm to self or others.  Coping with domestic stress, and continues to want to improve quality of life.    Perhaps a little frustrated about changes in domestic issues, making it harder for her to eat more sensibly, and to exercise regularly.  She would like to take care of these now that the weather is better.  She is asking for a little bit of time before we start anything else.  Respectfully refusing to see a dietitian at this time, states that she has regimens at home waiting for her.  Motivated to improve quality of life, and not wishing harm to self or others.  ENDOCRINE with no polyuria, polydipsia, polyphagia.  No blurred vision.  No skin, hair, nail changes.  No dramatic weight loss or weight gain.      Otherwise, physically active.  No rodney substernal chest pain, no orthopnea, no paroxysmal nocturnal dyspnea.  No particular cough, no particular sputum production.  CONSTITUTIONALLY, no fever, no chills.  No night sweats.  No lingering anorexia or nausea.  No apparent lymphadenopathy.  No apparent weight loss.        Review of Systems  Review of systems as in history of present illness, and otherwise, reviewed separately as well, and was unremarkable/negative/noncontributory.        Objective   /80 (BP Location: Left arm, Patient Position: Sitting, BP Cuff Size: Adult)   Pulse 73   Resp 20   Ht 1.6 m (5' 3\")   Wt 87.1 kg (192 lb)   SpO2 94%   BMI 34.01 kg/m²     Physical Exam  In otherwise " good spirits, better than last visit.  Not in distress or diaphoresis.  Alert, oriented x 3.  Amiable.  Not unkempt.  Obese build, but completely independent and capable.  Continues to want to improve quality of life, and does not wish harm to self or others.  Appropriate.    HEAD pink palpebral conjunctivae, anicteric sclerae.  NECK supple, no apparent jugular venous distention.  CARDIOVASCULAR not in distress or diaphoresis.  No bipedal edema.  LUNGS not in distress or diaphoresis.  Not using accessory muscles.  ABDOMEN soft, nontender.  BACK no costovertebral angle tenderness.  EXTREMITIES no clubbing, no cyanosis.  NEURO no facial asymmetry.  No apparent cranial nerve deficits.  Ambulating without need of assistance.  No apparent focal weakness.  No tremors.  PSYCH receptive, appropriate, and eager to maintain and improve quality of life.        LABORATORY results reviewed with patient.        Assessment/Plan   Diagnoses and all orders for this visit:  Weight gain  -     Follow Up In Primary Care - Established; Future  -     TSH with reflex to Free T4 if abnormal; Future  Class 1 obesity due to excess calories with serious comorbidity and body mass index (BMI) of 34.0 to 34.9 in adult  -     Follow Up In Primary Care - Established; Future  -     TSH with reflex to Free T4 if abnormal; Future  Trigeminal neuralgia  -     Follow Up In Primary Care - Established; Future  -     Comprehensive Metabolic Panel; Future  -     TSH with reflex to Free T4 if abnormal; Future  Essential hypertension  -     Follow Up In Primary Care - Established; Future  -     CBC and Auto Differential; Future  -     Comprehensive Metabolic Panel; Future  -     TSH with reflex to Free T4 if abnormal; Future  -     Magnesium; Future  -     Urinalysis with Reflex Culture and Microscopic; Future  Borderline hyperglycemia  -     Follow Up In Primary Care - Established; Future  -     Comprehensive Metabolic Panel; Future  -     Hemoglobin A1C;  Future  -     Urinalysis with Reflex Culture and Microscopic; Future  Hypercholesterolemia with hypertriglyceridemia  -     Follow Up In Primary Care - Established; Future  -     Comprehensive Metabolic Panel; Future  -     Lipid Panel; Future  Atco cardiac risk >20% in next 10 years  -     Follow Up In Primary Care - Established; Future  -     Comprehensive Metabolic Panel; Future  -     Hemoglobin A1C; Future  -     Lipid Panel; Future  Vitamin D deficiency  -     Follow Up In Primary Care - Established; Future  -     Comprehensive Metabolic Panel; Future  -     Vitamin D 25-Hydroxy,Total (for eval of Vitamin D levels); Future       Thank you very much for coming.  It is always nice to see you, especially since you have tried so hard to take care of your family!  I want to help you as well.    I do understand that you are a little frustrated about WEIGHT GAIN.  You have to take time to take care of yourself.  You are no good anybody if you are not feeling sick or tired.    Lots of rest and sleep.  Remember, when you are sleeping, that is when your metabolism kicks in to help you lose weight.    Sensible Diet please.  [place] diet book, DASH diet for ideas.  Let me know if you would like to see a DIETITIAN.    I am glad to hear that you like WALKING.  Brisk walking is one of the best exercises that you can do not only for weight management, but for energy!  I am glad that you already know this.  I am glad that you have access to the park.  At least 10 minutes of brisk walking in the morning, and at least 10 minutes of brisk walking in the afternoon, every day, for a minimum of 20 minutes total every day, even Sundays!    As you have noted, when you do exercises like brisk walking, or cycling, or swimming, all of these actually give you more energy!    In the meantime, I do understand that you have not had any trouble with your HEADACHE.  Do not stop all Carbamazepine cold turkey.  Instead, take your  CARBAMAZEPINE with SUPPER every evening until all gone.    Please come back in 4 months.  This will be enough time for you to try to figure out what to do with your weight.  Afterwards, when you return, we will see your success, and we will figure out other means to help you continue to lose weight.    Please do some FASTING laboratory examinations in 4 months.  Any  facility.  Afterwards, please come back so that we can figure out how you are doing and how to keep you as strong and energetic and productive as you can be.    Until then, please continue to take care of yourself and your family, and please continue to pray for our recovery from this pandemic.  I hope you are having a blessed Lent and I do wish you a happy Easter!            0  Return in 4 months.  20 minutes please.  FASTING laboratory examinations any  facility, then see me for reevaluation of hemodynamics, cardiovascular risk, weight management, preventive strategies, mood, energy, function.  Reassess for recurrence of trigeminal neuralgia.            0

## 2024-03-06 ENCOUNTER — HOSPITAL ENCOUNTER (OUTPATIENT)
Dept: WOMENS IMAGING | Age: 79
Discharge: HOME | End: 2024-03-08
Payer: MEDICARE

## 2024-03-06 VITALS — HEIGHT: 63 IN | BODY MASS INDEX: 33.49 KG/M2

## 2024-03-06 DIAGNOSIS — Z12.31 SCREENING MAMMOGRAM FOR BREAST CANCER: ICD-10-CM

## 2024-03-06 PROCEDURE — 77063 BREAST TOMOSYNTHESIS BI: CPT

## 2024-03-11 ENCOUNTER — OFFICE VISIT (OUTPATIENT)
Dept: PRIMARY CARE | Facility: CLINIC | Age: 79
End: 2024-03-11
Payer: MEDICARE

## 2024-03-11 VITALS
HEART RATE: 71 BPM | WEIGHT: 193 LBS | OXYGEN SATURATION: 92 % | DIASTOLIC BLOOD PRESSURE: 75 MMHG | BODY MASS INDEX: 34.2 KG/M2 | HEIGHT: 63 IN | RESPIRATION RATE: 22 BRPM | SYSTOLIC BLOOD PRESSURE: 118 MMHG

## 2024-03-11 DIAGNOSIS — M54.50 ACUTE MIDLINE LOW BACK PAIN WITHOUT SCIATICA: Primary | ICD-10-CM

## 2024-03-11 DIAGNOSIS — R73.9 BORDERLINE HYPERGLYCEMIA: ICD-10-CM

## 2024-03-11 DIAGNOSIS — E66.09 CLASS 1 OBESITY DUE TO EXCESS CALORIES WITH SERIOUS COMORBIDITY AND BODY MASS INDEX (BMI) OF 34.0 TO 34.9 IN ADULT: ICD-10-CM

## 2024-03-11 DIAGNOSIS — N39.0 RECURRENT UTI (URINARY TRACT INFECTION): ICD-10-CM

## 2024-03-11 PROCEDURE — 1126F AMNT PAIN NOTED NONE PRSNT: CPT | Performed by: INTERNAL MEDICINE

## 2024-03-11 PROCEDURE — 1160F RVW MEDS BY RX/DR IN RCRD: CPT | Performed by: INTERNAL MEDICINE

## 2024-03-11 PROCEDURE — 1157F ADVNC CARE PLAN IN RCRD: CPT | Performed by: INTERNAL MEDICINE

## 2024-03-11 PROCEDURE — 1159F MED LIST DOCD IN RCRD: CPT | Performed by: INTERNAL MEDICINE

## 2024-03-11 PROCEDURE — 99213 OFFICE O/P EST LOW 20 MIN: CPT | Performed by: INTERNAL MEDICINE

## 2024-03-11 PROCEDURE — 1036F TOBACCO NON-USER: CPT | Performed by: INTERNAL MEDICINE

## 2024-03-11 PROCEDURE — 3078F DIAST BP <80 MM HG: CPT | Performed by: INTERNAL MEDICINE

## 2024-03-11 PROCEDURE — 3074F SYST BP LT 130 MM HG: CPT | Performed by: INTERNAL MEDICINE

## 2024-03-11 RX ORDER — TIZANIDINE 2 MG/1
TABLET ORAL
Qty: 30 TABLET | Refills: 0 | Status: SHIPPED | OUTPATIENT
Start: 2024-03-11 | End: 2024-04-22 | Stop reason: WASHOUT

## 2024-03-11 RX ORDER — PREDNISONE 10 MG/1
TABLET ORAL
Qty: 21 TABLET | Refills: 0 | Status: SHIPPED | OUTPATIENT
Start: 2024-03-11 | End: 2024-04-22 | Stop reason: ALTCHOICE

## 2024-03-11 RX ORDER — MELOXICAM 7.5 MG/1
TABLET ORAL
Qty: 30 TABLET | Refills: 1 | Status: SHIPPED | OUTPATIENT
Start: 2024-03-11 | End: 2024-04-22 | Stop reason: WASHOUT

## 2024-03-11 NOTE — PROGRESS NOTES
"Subjective   Patient ID: Elvira Dunne is a 79 y.o. female who presents for Back Pain.    HPI   The patient woke up Saturday with midline low back pain, stiffness, limiting her lateral movements.  States that she feels worse after standing for some time, especially when trying to do the dishes.  Otherwise, no ataxia, no involvement of 1 leg or the other.  No change in bowel or bladder character or habits.  No dysuria, flank, suprapubic pain.  ENDOCRINE with no polyuria, polydipsia, polyphagia.  No blurred vision.  No skin, hair, nail changes.  No dramatic weight loss or weight gain.      Otherwise, no rodney substernal chest pain, no orthopnea, no paroxysmal nocturnal dyspnea.  No headache, blurred vision, diplopia.  No dysphagia.  No particular cough, no particular sputum production.  In the meantime, continues to want to improve quality of life, and does not wish harm to self or others.    Respectfully refusing any physical therapy at this time.  Likewise, stating that she was just seen by UROLOGY, with urine apparently done revealing negative findings.        Review of Systems  Review of systems as in history of present illness, and otherwise, reviewed separately as well, and was unremarkable/negative/noncontributory.        Objective   /75 (BP Location: Right arm, Patient Position: Sitting, BP Cuff Size: Adult)   Pulse 71   Resp 22   Ht 1.6 m (5' 3\")   Wt 87.5 kg (193 lb)   SpO2 92%   BMI 34.19 kg/m²     Physical Exam  Miserable perhaps, but otherwise, not in distress or diaphoresis.  Alert, oriented x 3.  Amiable.  Not unkempt.  Obese build, but remaining completely independent and capable.  Continues to want to improve quality of life, and does not wish harm to self or others.  Appropriate.    HEAD pink palpebral conjunctivae, anicteric sclerae.  NECK supple, no apparent jugular venous distention.  CARDIOVASCULAR not in distress or diaphoresis.  No bipedal edema.  LUNGS not in distress or " diaphoresis.  Not using accessory muscles.  ABDOMEN soft, nontender.  BACK no costovertebral angle tenderness.  Pinpoint tenderness along lower back area, with some paravertebral muscle tenderness close to midline bilaterally.  No crepitus.  EXTREMITIES no clubbing, no cyanosis.  NEURO no facial asymmetry.  No apparent cranial nerve deficits.  Romberg negative.  Ambulating without need of assistance.  No apparent focal weakness.  No tremors.  PSYCH receptive, appropriate, and eager to maintain and improve quality of life.        LABORATORY results reviewed with patient.        Assessment/Plan   Diagnoses and all orders for this visit:  Acute midline low back pain without sciatica  -     meloxicam (Mobic) 7.5 mg tablet; Please take 1 tablet by mouth with SUPPER every evening.  Always with food.  Watch out for acid indigestion symptoms.  Thank you.  -     predniSONE (Deltasone) 10 mg tablet; Please take 2 tablets by mouth with food now, then take 3 tablets with breakfast for 2 days, then take 2 tablets with breakfast for 3 days, then take 1 tablet with breakfast until gone.  Thank you.  -     tiZANidine (Zanaflex) 2 mg tablet; Please take 1 tablet by mouth with food every 6-8 hours as needed for musculoskeletal pain.  Watch out for sleepiness.  No driving if drowsy.  Never take with alcohol.  Thank you.  Recurrent UTI (urinary tract infection)  Class 1 obesity due to excess calories with serious comorbidity and body mass index (BMI) of 34.0 to 34.9 in adult  Borderline hyperglycemia       Thank you very much for coming.  I am very happy to see you.    I am sorry that your back is acting up!  The best course of treatment for this would be PHYSICAL THERAPY, but if you would like to be treated more conservatively, we can do that.    If on the other hand you are still miserable after 3 days, please call me back.  I will most likely order an x-ray of your back, a urine study, and physical therapy.    In the meantime, we  will have to have you STRETCH OUT your back, just as we discussed.  Stand with your feet slightly apart.  Keep your knees straight, and bend at the waist and let gravity pull your body down and slowly stretch out your back.  Hold this position for 20 seconds, then get back up.  Have somebody help you out, so that you do not get lightheaded or fall over!    Repeat this to stretch out your back.    Also, HEAT does help.    MELOXICAM 7.5 mg with supper every evening.  Always with food.  Take even if you are not having any pain at the moment.  This helps with the swelling.    PREDNISONE 10 mg tablets, please take 2 tablets with food now,  Then take 3 tablets with breakfast for 2 days,  Then take 2 tablets with breakfast for 3 days,  Then take 1 tablet with breakfast until all gone.    TIZANIDINE muscle relaxant, take 2 mg tablet with food every 6-8 hours as needed for musculoskeletal pain.  Watch out for sleepiness.  No driving please if drowsy.  Never take with alcohol.    TYLENOL EXTRA STRENGTH 500 mg, take 2 tablets by mouth every 6 hours as needed for pain, up to 6 tablets in 24 hours.  Tylenol can be taken in combination with your other medications, and Tylenol can be taken with or without food.    Remember, if you are no better after 3 days, please call me back, and I will give you further instructions.  Until then, please continue to take care of yourself and each other, and please continue to pray for our recovery from this pandemic.  I hope that you are having a blessed Lent, and I do wish you a happy Easter!            0  The patient will call as needed.  Respectfully refusing x-ray, urinalysis, physical therapy at this time.  To be considered if no better after 3 days.  Reviewed risks and benefits of using medications, including NSAIDs and muscle relaxants.  Consider CMP.            0

## 2024-03-11 NOTE — PATIENT INSTRUCTIONS
Thank you very much for coming.  I am very happy to see you.    I am sorry that your back is acting up!  The best course of treatment for this would be PHYSICAL THERAPY, but if you would like to be treated more conservatively, we can do that.    If on the other hand you are still miserable after 3 days, please call me back.  I will most likely order an x-ray of your back, a urine study, and physical therapy.    In the meantime, we will have to have you STRETCH OUT your back, just as we discussed.  Stand with your feet slightly apart.  Keep your knees straight, and bend at the waist and let gravity pull your body down and slowly stretch out your back.  Hold this position for 20 seconds, then get back up.  Have somebody help you out, so that you do not get lightheaded or fall over!    Repeat this to stretch out your back.    Also, HEAT does help.    MELOXICAM 7.5 mg with supper every evening.  Always with food.  Take even if you are not having any pain at the moment.  This helps with the swelling.    PREDNISONE 10 mg tablets, please take 2 tablets with food now,  Then take 3 tablets with breakfast for 2 days,  Then take 2 tablets with breakfast for 3 days,  Then take 1 tablet with breakfast until all gone.    TIZANIDINE muscle relaxant, take 2 mg tablet with food every 6-8 hours as needed for musculoskeletal pain.  Watch out for sleepiness.  No driving please if drowsy.  Never take with alcohol.    TYLENOL EXTRA STRENGTH 500 mg, take 2 tablets by mouth every 6 hours as needed for pain, up to 6 tablets in 24 hours.  Tylenol can be taken in combination with your other medications, and Tylenol can be taken with or without food.    Remember, if you are no better after 3 days, please call me back, and I will give you further instructions.  Until then, please continue to take care of yourself and each other, and please continue to pray for our recovery from this pandemic.  I hope that you are having a blessed Lent, and I do  wish you a happy Easter!            0  The patient will call as needed.  Respectfully refusing x-ray, urinalysis, physical therapy at this time.  To be considered if no better after 3 days.  Reviewed risks and benefits of using medications, including NSAIDs and muscle relaxants.  Consider CMP.            0

## 2024-03-28 ENCOUNTER — APPOINTMENT (OUTPATIENT)
Dept: PRIMARY CARE | Facility: CLINIC | Age: 79
End: 2024-03-28
Payer: MEDICARE

## 2024-04-15 ENCOUNTER — HOSPITAL ENCOUNTER (OUTPATIENT)
Dept: RADIOLOGY | Facility: CLINIC | Age: 79
Discharge: HOME | End: 2024-04-15
Payer: MEDICARE

## 2024-04-15 ENCOUNTER — OFFICE VISIT (OUTPATIENT)
Dept: ORTHOPEDIC SURGERY | Facility: CLINIC | Age: 79
End: 2024-04-15
Payer: MEDICARE

## 2024-04-15 DIAGNOSIS — S63.501A WRIST SPRAIN, RIGHT, INITIAL ENCOUNTER: ICD-10-CM

## 2024-04-15 DIAGNOSIS — S60.229A CONTUSION OF DORSUM OF HAND: ICD-10-CM

## 2024-04-15 DIAGNOSIS — M79.641 PAIN OF RIGHT HAND: ICD-10-CM

## 2024-04-15 PROCEDURE — 1157F ADVNC CARE PLAN IN RCRD: CPT | Performed by: INTERNAL MEDICINE

## 2024-04-15 PROCEDURE — 1160F RVW MEDS BY RX/DR IN RCRD: CPT | Performed by: INTERNAL MEDICINE

## 2024-04-15 PROCEDURE — 73130 X-RAY EXAM OF HAND: CPT | Mod: RT

## 2024-04-15 PROCEDURE — 73130 X-RAY EXAM OF HAND: CPT | Mod: RIGHT SIDE | Performed by: INTERNAL MEDICINE

## 2024-04-15 PROCEDURE — 99213 OFFICE O/P EST LOW 20 MIN: CPT | Performed by: INTERNAL MEDICINE

## 2024-04-15 PROCEDURE — 99214 OFFICE O/P EST MOD 30 MIN: CPT | Performed by: INTERNAL MEDICINE

## 2024-04-15 PROCEDURE — 1159F MED LIST DOCD IN RCRD: CPT | Performed by: INTERNAL MEDICINE

## 2024-04-15 RX ORDER — METHYLPREDNISOLONE 4 MG/1
TABLET ORAL
Qty: 1 EACH | Refills: 0 | Status: SHIPPED | OUTPATIENT
Start: 2024-04-15 | End: 2024-04-22 | Stop reason: ALTCHOICE

## 2024-04-15 NOTE — PROGRESS NOTES
Acute Injury New Patient Visit    CC:   Chief Complaint   Patient presents with    Right Hand - Pain       HPI: Elvira is a 79 y.o. female presents today for evaluation for acute right hand injury sustained after a fall. She is here for initial evaluation and x-rays.         Review of Systems   GENERAL: Negative for malaise, significant weight loss, fever  MUSCULOSKELETAL: See HPI  NEURO:  Negative for numbness / tingling     Past Medical History  Past Medical History:   Diagnosis Date    Arthritis     Glaucoma     Nephrolithiasis     Personal history of other diseases of the musculoskeletal system and connective tissue 10/05/2020    History of arthritis    Personal history of other infectious and parasitic diseases 11/23/2016    History of herpes simplex infection    Personal history of pneumonia (recurrent) 11/23/2016    History of pneumonia    Unspecified visual loss 10/05/2020    Vision problems    Urinary tract infection     Viral meningitis, unspecified ([facility]) 11/23/2016    Viral meningitis       Medication review  Medication Documentation Review Audit       Reviewed by Cristina Hanna MA (Medical Assistant) on 03/11/24 at 1529      Medication Order Taking? Sig Documenting Provider Last Dose Status   acetaminophen (Tylenol Extra Strength) 500 mg tablet [id number] No Take 2 tablets (1,000 mg) by mouth every 8 hours if needed (Pain). NO MORE THAN 6 in 24 hours please.  Thank you Historical MD [place] Past Week Active   ascorbic acid (Vitamin C) 500 mg tablet [id number] No Take 2 tablets (1,000 mg) by mouth once daily. Xavier Putnam MD Past Week Active   bimatoprost (Lumigan) 0.03 % ophthalmic solution [id number] No Administer 1 drop into both eyes once daily in the evening. Xavier Putnam MD 10/8/2023 Active   calcium carbonate-vitamin D3 600 mg-20 mcg (800 unit) tablet [id number] No Take 1 tablet by mouth twice a day. Please take with meals and full glass of water. Thank you Historical MD [place]  10/8/2023 Active   carBAMazepine XR (Tegretol XR) 100 mg 12 hr tablet [id number]  Take 1 tablet (100 mg) by mouth 2 times a day. Do not crush, chew, or split. Isiah Wheeler MD  Active   ciclopirox (Penlac) 8 % solution [id number] No Apply to affected nail bed(s) and adjacent skin daily.  Remove with alcohol every 7 days. Historical Provider, MD Past Week Active   clobetasol (Olux) 0.05 % topical foam [id number] No 1 (one) time each day. Apply to scalp Historical Provider, MD Past Week Active   cranberry 400 mg capsule [id number] No Take by mouth. Historical Provider, MD Past Week Active   cyanocobalamin (Vitamin B-12) 1,000 mcg tablet [id number] No Take 100 mcg by mouth once daily. Historical Provider, MD Past Week Active   ibuprofen 600 mg tablet [id number] No Please take 1 tablet every 6-8 hours as needed for pain.  No more than 3 tablets in 24 hours.  Always with food please.  Watch out for acid indigestion symptoms.  Thank you. Isiah Wheeler MD Past Month Active   ketoconazole (NIZOral) 2 % shampoo [id number] No  Historical Provider, MD Past Month Active   multivitamin tablet [id number] No Take 1 tablet by mouth once daily. Historical Provider, MD Past Week Active   sodium chloride 5 % ophthalmic ointment [id number] No  Historical Provider, MD Past Week Active   triamcinolone (Kenalog) 0.025 % ointment [id number] No Please apply a thin layer to rash 3 times a day.  Thin layer only please.  Do not cover rash with bandage.  Thank you. Isiah Wheeler MD Past Week Active   valACYclovir (Valtrex) 500 mg tablet [id number]  TAKE 1 TABLET BY MOUTH TWICE A DAY Isiah Wheeler MD  Active                    Allergies  Allergies   Allergen Reactions    Promethazine Other     Restless legs       Social History  Social History     Socioeconomic History    Marital status:      Spouse name: Not on file    Number of children: Not on file    Years of education: Not on file    Highest education level: Not on  file   Occupational History    Not on file   Tobacco Use    Smoking status: Never    Smokeless tobacco: Never   Vaping Use    Vaping status: Not on file   Substance and Sexual Activity    Alcohol use: Not on file     Comment: socially    Drug use: Never    Sexual activity: Yes   Other Topics Concern    Not on file   Social History Narrative    Not on file     Social Determinants of Health     Financial Resource Strain: Low Risk  (3/21/2023)    Received from [facility]    Overall Financial Resource Strain (CARDIA)     Difficulty of Paying Living Expenses: Not hard at all   Food Insecurity: Not on file (3/21/2023)   Transportation Needs: Unknown (3/21/2023)    Received from [facility]    PRAPARE - Transportation     Lack of Transportation (Medical): Not on file     Lack of Transportation (Non-Medical): No   Physical Activity: Not on file   Stress: Not on file   Social Connections: Not on file   Intimate Partner Violence: Not on file   Housing Stability: Unknown (3/21/2023)    Received from [facility]    Housing Stability Vital Sign     Unable to Pay for Housing in the Last Year: Not on file     Number of Places Lived in the Last Year: Not on file     Unstable Housing in the Last Year: No       Surgical History  Past Surgical History:   Procedure Laterality Date     SECTION, CLASSIC  2016     Section     SECTION, LOW TRANSVERSE      CHOLECYSTECTOMY      COLONOSCOPY  2016    Complete Colonoscopy    GALLBLADDER SURGERY  2016    Gallbladder Surgery    JOINT REPLACEMENT      OTHER SURGICAL HISTORY  2016    Wrist Surgery    OTHER SURGICAL HISTORY  10/05/2020    Joint replacement procedure    TONSILLECTOMY  2016    Tonsillectomy       Physical Exam:  GENERAL:  Patient is awake, alert, and oriented to person place and time.  Patient appears well nourished and well kept.  Affect Calm, Not Acutely  Distressed.  HEENT:  Normocephalic, Atraumatic, EOMI  CARDIOVASCULAR:  Hemodynamically stable.  RESPIRATORY:  Normal respirations with unlabored breathing.  Extremity: Right hand and wrist shows skin is intact.  No obvious swelling or deformity.  There is no pain in the distal radius or distal ulna.  Most of her pain is over the CMC joint.  EPL tendon is intact.  She can pronate and supinate with no pain discomfort.  Her flexor and extensor mechanism intact.  No pain in the carpal bones.  Distal pulses are palpable.  She is neurovasc intact.  Left hand and wrist was examined for comparison.      Diagnostics: X-rays reviewed      Procedure: None    Assessment:   Right wrist sprain and contusion  Right wrist osteoarthritis    Plan: Elvira presents today for initial evaluation for acute right hand injury sustained after a recent fall. X-rays showed no obvious fractures. We placed her into a wrist pro spica fracture brace and Medrol does Moe for acute information. She will follow-up with the hand team in 2-3 weeks. If no improvement, may consider corticosteroid injection.    Orders Placed This Encounter    XR hand right 3+ views      At the conclusion of the visit there were no further questions by the patient/family regarding their plan of care.  Patient was instructed to call or return with any issues, questions, or concerns regarding their injury and/or treatment plan described above.     04/15/24 at 3:05 PM - Antonio Gloria MD  Scribe Attestation  By signing my name below, I Eric Villa, Scribe   attest that this documentation has been prepared under the direction and in the presence of Antonio Gloria MD.    Office: [phone]    This note was prepared using voice recognition software.  The details of this note are correct and have been reviewed, and corrected to the best of my ability.  Some grammatical errors may persist related to the Dragon software.

## 2024-04-18 ENCOUNTER — HOSPITAL ENCOUNTER (EMERGENCY)
Facility: HOSPITAL | Age: 79
Discharge: HOME | End: 2024-04-18
Payer: MEDICARE

## 2024-04-18 ENCOUNTER — APPOINTMENT (OUTPATIENT)
Dept: RADIOLOGY | Facility: HOSPITAL | Age: 79
End: 2024-04-18
Payer: MEDICARE

## 2024-04-18 VITALS
DIASTOLIC BLOOD PRESSURE: 95 MMHG | SYSTOLIC BLOOD PRESSURE: 185 MMHG | RESPIRATION RATE: 18 BRPM | HEIGHT: 63 IN | WEIGHT: 193 LBS | TEMPERATURE: 97.2 F | BODY MASS INDEX: 34.2 KG/M2 | HEART RATE: 81 BPM | OXYGEN SATURATION: 98 %

## 2024-04-18 DIAGNOSIS — S06.0X0A CONCUSSION WITHOUT LOSS OF CONSCIOUSNESS, INITIAL ENCOUNTER: ICD-10-CM

## 2024-04-18 DIAGNOSIS — S09.90XA CLOSED HEAD INJURY, INITIAL ENCOUNTER: Primary | ICD-10-CM

## 2024-04-18 LAB
FLUAV RNA RESP QL NAA+PROBE: NOT DETECTED
FLUBV RNA RESP QL NAA+PROBE: NOT DETECTED
SARS-COV-2 RNA RESP QL NAA+PROBE: NOT DETECTED

## 2024-04-18 PROCEDURE — 70450 CT HEAD/BRAIN W/O DYE: CPT

## 2024-04-18 PROCEDURE — 76377 3D RENDER W/INTRP POSTPROCES: CPT | Performed by: RADIOLOGY

## 2024-04-18 PROCEDURE — 72125 CT NECK SPINE W/O DYE: CPT | Performed by: RADIOLOGY

## 2024-04-18 PROCEDURE — 70486 CT MAXILLOFACIAL W/O DYE: CPT

## 2024-04-18 PROCEDURE — 70450 CT HEAD/BRAIN W/O DYE: CPT | Performed by: RADIOLOGY

## 2024-04-18 PROCEDURE — 70486 CT MAXILLOFACIAL W/O DYE: CPT | Performed by: RADIOLOGY

## 2024-04-18 PROCEDURE — 99285 EMERGENCY DEPT VISIT HI MDM: CPT | Mod: 25

## 2024-04-18 PROCEDURE — 72125 CT NECK SPINE W/O DYE: CPT

## 2024-04-18 PROCEDURE — 76377 3D RENDER W/INTRP POSTPROCES: CPT

## 2024-04-18 PROCEDURE — 87636 SARSCOV2 & INF A&B AMP PRB: CPT | Performed by: PHYSICIAN ASSISTANT

## 2024-04-18 RX ORDER — ONDANSETRON 4 MG/1
4 TABLET, ORALLY DISINTEGRATING ORAL EVERY 8 HOURS PRN
Qty: 15 TABLET | Refills: 0 | Status: SHIPPED | OUTPATIENT
Start: 2024-04-18 | End: 2024-04-23

## 2024-04-18 RX ORDER — NAPROXEN SODIUM 550 MG/1
550 TABLET ORAL
Qty: 20 TABLET | Refills: 0 | Status: SHIPPED | OUTPATIENT
Start: 2024-04-18 | End: 2024-04-22 | Stop reason: WASHOUT

## 2024-04-18 ASSESSMENT — COLUMBIA-SUICIDE SEVERITY RATING SCALE - C-SSRS
1. IN THE PAST MONTH, HAVE YOU WISHED YOU WERE DEAD OR WISHED YOU COULD GO TO SLEEP AND NOT WAKE UP?: NO
2. HAVE YOU ACTUALLY HAD ANY THOUGHTS OF KILLING YOURSELF?: NO
6. HAVE YOU EVER DONE ANYTHING, STARTED TO DO ANYTHING, OR PREPARED TO DO ANYTHING TO END YOUR LIFE?: NO

## 2024-04-18 ASSESSMENT — PAIN - FUNCTIONAL ASSESSMENT: PAIN_FUNCTIONAL_ASSESSMENT: 0-10

## 2024-04-18 ASSESSMENT — PAIN SCALES - GENERAL: PAINLEVEL_OUTOF10: 3

## 2024-04-18 NOTE — ED PROVIDER NOTES
HPI   Chief Complaint   Patient presents with    Fall     Pt states on Monday she tripped and fell onto the ground. PT c/o headache, nausea, chills, and neck pain. PT states she did hit her face. PT denies LOC or blood thinner use.       A 79-year-old female patient comes in the emergency department today after a fall that occurred on Monday.  She describes that she tripped causing her to fall forward.  States she struck her right hand and then hit her face.  States since that time she been having a headache with associated photophobia, nausea.  Says she also feels chills.  She [facility].  It is not broken but they placed her on some prednisone for her discomfort.  She is not here for evaluation of that but rather for this headache.  She currently rates it a 3 out of 10 on the pain scale she did take some p.o. Tylenol this morning.  She otherwise has no other complaints at present time this purpose she comes in the emergency department today for further evaluation.                          No data recorded                   Patient History   Past Medical History:   Diagnosis Date    Arthritis     Glaucoma     Nephrolithiasis     Personal history of other diseases of the musculoskeletal system and connective tissue 10/05/2020    History of arthritis    Personal history of other infectious and parasitic diseases 2016    History of herpes simplex infection    Personal history of pneumonia (recurrent) 2016    History of pneumonia    Unspecified visual loss 10/05/2020    Vision problems    Urinary tract infection     Viral meningitis, unspecified ([facility]) 2016    Viral meningitis     Past Surgical History:   Procedure Laterality Date     SECTION, CLASSIC  2016     Section     SECTION, LOW TRANSVERSE      CHOLECYSTECTOMY      COLONOSCOPY  2016    Complete Colonoscopy    GALLBLADDER SURGERY   11/23/2016    Gallbladder Surgery    JOINT REPLACEMENT      OTHER SURGICAL HISTORY  11/23/2016    Wrist Surgery    OTHER SURGICAL HISTORY  10/05/2020    Joint replacement procedure    TONSILLECTOMY  11/23/2016    Tonsillectomy     Family History   Problem Relation Name Age of Onset    Stroke Mother      COPD Father      Dementia Father       Social History     Tobacco Use    Smoking status: Never    Smokeless tobacco: Never   Vaping Use    Vaping status: Not on file   Substance Use Topics    Alcohol use: Not on file     Comment: socially    Drug use: Never       Physical Exam   ED Triage Vitals [04/18/24 1006]   Temperature Heart Rate Respirations BP   36.2 °C (97.2 °F) 81 18 (!) 185/95      Pulse Ox Temp src Heart Rate Source Patient Position   98 % -- -- --      BP Location FiO2 (%)     -- --       Physical Exam  Constitutional:       General: She is in acute distress (Mild).      Appearance: Normal appearance. She is not ill-appearing or diaphoretic.   HENT:      Head: Normocephalic and atraumatic.      Nose: Nose normal.   Eyes:      Extraocular Movements: Extraocular movements intact.      Conjunctiva/sclera: Conjunctivae normal.      Pupils: Pupils are equal, round, and reactive to light.   Cardiovascular:      Rate and Rhythm: Normal rate and regular rhythm.   Pulmonary:      Effort: Pulmonary effort is normal. No respiratory distress.      Breath sounds: Normal breath sounds. No stridor. No wheezing.   Musculoskeletal:         General: Normal range of motion.      Cervical back: Normal range of motion.   Skin:     General: Skin is warm and dry.   Neurological:      General: No focal deficit present.      Mental Status: She is alert and oriented to person, place, and time. Mental status is at baseline.   Psychiatric:         Mood and Affect: Mood normal.         ED Course & MDM   Diagnoses as of 04/18/24 1219   Closed head injury, initial encounter   Concussion without loss of consciousness, initial encounter        Medical Decision Making  A 79-year-old female patient comes in the emergency department today after a fall that occurred on Monday.  She describes that she tripped causing her to fall forward.  States she struck her right hand and then hit her face.  States since that time she been having a headache with associated photophobia, nausea.  Says she also feels chills.  She states that [facility] evaluated.  It is not broken but they placed her on some prednisone for her discomfort.  She is not here for evaluation of that but rather for this headache.  She currently rates it a 3 out of 10 on the pain scale she did take some p.o. Tylenol this morning.  She otherwise has no other complaints at present time this purpose she comes in the emergency department today for further evaluation.    CT head, C-spine, maxillofacial bones are ordered to rule out any acute intracranial bleed, brain edema, calvarial fracture, facial fracture, cervical spine injury.  Testing for COVID-19 and influenza as well as urinalysis to rule out any infectious causes of patient's feelings of chills.  Offered patient medication for her discomfort or for nausea at this time patient declines.    Patient negative for COVID-19 and influenza.  Patient's CT head is negative, CT C-spine is negative other than some age-indeterminate compression deformities of T3-T4.  Patient has no point tenderness in this area on clinical exam today.  Patient maxillofacial CT study is negative as well.  I will discharge patient home p.o. medications for her nausea as well as discomfort.  Patient agrees with this plan expressed full verbal understanding.  Questions answered.    Historians patient    Diagnosis: Head injury with mild concussion      Labs Reviewed   SARS-COV-2 PCR - Normal       Result Value    Coronavirus 2019, PCR Not Detected      Narrative:     This assay has received FDA Emergency Use Authorization  (EUA) and is only authorized for the duration of time that circumstances exist to justify the authorization of the emergency use of in vitro diagnostic tests for the detection of SARS-CoV-2 virus and/or diagnosis of COVID-19 infection under section 564(b)(1) of the Act, 21 U.S.C. 360bbb-3(b)(1). This assay is an in vitro diagnostic nucleic acid amplification test for the qualitative detection of SARS-CoV-2 from nasopharyngeal specimens and has been validated for use at [facility]. Negative results do not preclude COVID-19 infections and should not be used as the sole basis for diagnosis, treatment, or other management decisions.     INFLUENZA A AND B PCR - Normal    Flu A Result Not Detected      Flu B Result Not Detected      Narrative:     This assay is an in vitro diagnostic multiplex nucleic acid amplification test for the detection and discrimination of Influenza A & B from nasopharyngeal specimens, and has been validated for use at [facility]. Negative results do not preclude Influenza A/B infections, and should not be used as the sole basis for diagnosis, treatment, or other management decisions. If Influenza A/B and RSV PCR results are negative, testing for Parainfluenza virus, Adenovirus and Metapneumovirus is routinely performed for [facility] pediatric oncology and intensive care inpatients, and is available on other patients by placing an add-on request.   URINALYSIS WITH REFLEX CULTURE AND MICROSCOPIC    Narrative:     The following orders were created for panel order Urinalysis with Reflex Culture and Microscopic.  Procedure                               Abnormality         Status                     ---------                               -----------         ------                     Urinalysis with Reflex C...[[id number]]                                                 Extra Urine Gray Tube[[id number]]                                                          Please view results for these tests on the individual orders.   URINALYSIS WITH REFLEX CULTURE AND MICROSCOPIC   EXTRA URINE GRAY TUBE        CT head wo IV contrast   Final Result   Unremarkable exam.   There has not been significant interval change from the prior exam.        Signed by: Hernando Odell 4/18/2024 11:06 AM   Dictation workstation:   [id number]      CT cervical spine wo IV contrast   Final Result   Mild spondylosis.   Age indeterminate compression deformities of the T3 and T4 vertebral   bodies.        Signed by: Hernando Odell 4/18/2024 11:11 AM   Dictation workstation:   [id number]      CT maxillofacial bones wo IV contrast   Final Result   No acute facial bone fracture visualized.        Signed by: Hernando Odell 4/18/2024 11:15 AM   Dictation workstation:   [id number]      CT 3D reconstruction   Final Result   No acute facial bone fracture visualized.        Signed by: Hernando Odell 4/18/2024 11:15 AM   Dictation workstation:   [id number]          Procedure  Procedures     Livan Castillo PA-C  04/18/24 1213

## 2024-04-18 NOTE — Clinical Note
Elvira Dunne was seen and treated in our emergency department on 4/18/2024.  She may return to work on 04/23/2024.       If you have any questions or concerns, please don't hesitate to call.      Livan Castillo PA-C

## 2024-04-22 ENCOUNTER — OFFICE VISIT (OUTPATIENT)
Dept: PRIMARY CARE | Facility: CLINIC | Age: 79
End: 2024-04-22
Payer: MEDICARE

## 2024-04-22 VITALS
HEART RATE: 77 BPM | SYSTOLIC BLOOD PRESSURE: 116 MMHG | DIASTOLIC BLOOD PRESSURE: 62 MMHG | WEIGHT: 192 LBS | BODY MASS INDEX: 34.02 KG/M2 | HEIGHT: 63 IN

## 2024-04-22 DIAGNOSIS — Z91.89 FRAMINGHAM CARDIAC RISK >20% IN NEXT 10 YEARS: ICD-10-CM

## 2024-04-22 DIAGNOSIS — I10 ESSENTIAL HYPERTENSION: ICD-10-CM

## 2024-04-22 DIAGNOSIS — E55.9 VITAMIN D DEFICIENCY: ICD-10-CM

## 2024-04-22 DIAGNOSIS — K59.04 CHRONIC IDIOPATHIC CONSTIPATION: Primary | ICD-10-CM

## 2024-04-22 DIAGNOSIS — E78.2 HYPERCHOLESTEROLEMIA WITH HYPERTRIGLYCERIDEMIA: ICD-10-CM

## 2024-04-22 DIAGNOSIS — F41.8 ANXIETY WITH DEPRESSION: ICD-10-CM

## 2024-04-22 DIAGNOSIS — E66.09 CLASS 1 OBESITY DUE TO EXCESS CALORIES WITH SERIOUS COMORBIDITY AND BODY MASS INDEX (BMI) OF 34.0 TO 34.9 IN ADULT: ICD-10-CM

## 2024-04-22 DIAGNOSIS — N39.0 RECURRENT UTI (URINARY TRACT INFECTION): ICD-10-CM

## 2024-04-22 DIAGNOSIS — E53.8 VITAMIN B12 DEFICIENCY: ICD-10-CM

## 2024-04-22 DIAGNOSIS — R73.9 BORDERLINE HYPERGLYCEMIA: ICD-10-CM

## 2024-04-22 PROCEDURE — 3078F DIAST BP <80 MM HG: CPT | Performed by: INTERNAL MEDICINE

## 2024-04-22 PROCEDURE — 1036F TOBACCO NON-USER: CPT | Performed by: INTERNAL MEDICINE

## 2024-04-22 PROCEDURE — 1160F RVW MEDS BY RX/DR IN RCRD: CPT | Performed by: INTERNAL MEDICINE

## 2024-04-22 PROCEDURE — 99213 OFFICE O/P EST LOW 20 MIN: CPT | Performed by: INTERNAL MEDICINE

## 2024-04-22 PROCEDURE — 1157F ADVNC CARE PLAN IN RCRD: CPT | Performed by: INTERNAL MEDICINE

## 2024-04-22 PROCEDURE — 1159F MED LIST DOCD IN RCRD: CPT | Performed by: INTERNAL MEDICINE

## 2024-04-22 PROCEDURE — 3074F SYST BP LT 130 MM HG: CPT | Performed by: INTERNAL MEDICINE

## 2024-04-22 RX ORDER — LACTULOSE 10 G/15ML
SOLUTION ORAL
Qty: 473 ML | Refills: 0 | Status: SHIPPED | OUTPATIENT
Start: 2024-04-22

## 2024-04-22 RX ORDER — PSYLLIUM HUSK 3.4 G/5.8G
1 POWDER ORAL 2 TIMES DAILY
COMMUNITY
Start: 2024-04-22

## 2024-04-22 NOTE — PROGRESS NOTES
"Subjective   Patient ID: Elvira Dunne is a 79 y.o. female who presents for Follow-up (Patient c/o constipation .) and Constipation (Patient presented today with c/o constipation.).    HPI   Patient here for evaluation of CONSTIPATION.  Appetite preserved, with no nausea, vomiting, no abdominal distress.  Likewise, no skin changes, rashes, pruritus, jaundice.  No easy bruisability.  Has had history of recurrent UTI in the past, following closely with UROLOGY.  Currently, no dysuria, flank, suprapubic pain.    Still, states that she feels a fluttering along the right upper quadrant area, and jokes around that she feels that she feels there is a baby kicking inside of her!    In the meantime, recent history noted, with patient tripping over something on the ground.  She caught herself, and injured her right wrist.  She also states that she still hit her face on the pavement.  Seen by ORTHOPEDICS, with no fracture noted, right wrist.  Sent to the ER, where further anatomical studies, CT scan of the head, facial bone reconstruction, x-ray of cervical spine, all revealing negative findings.  Patient back to baseline state of health, physically active, not requiring any assistive device.    The patient has been in good spirits, especially since her daughter and grandchildren are seemingly in a better situation.  Still, she worries about potential stress from her son-in-law.  Otherwise, no headache, blurred vision, diplopia.  No dysphagia, and has seemingly recovered from her fall.  Continues to want to improve quality of life, and does not wish harm to self or others.        Review of Systems  Review of systems as in history of present illness, and otherwise, reviewed separately as well, and was unremarkable/negative/noncontributory.        Objective   /62 (BP Location: Left arm, Patient Position: Sitting, BP Cuff Size: Adult)   Pulse 77   Ht 1.6 m (5' 3\")   Wt 87.1 kg (192 lb)   BMI 34.01 kg/m²     Physical " Exam  In good spirits.  Not in distress or diaphoresis.  Alert, oriented x 3.  Amiable.  Not unkempt.  Receptive, cheerful, appropriate, and eager to improve quality of life.  Does not wish harm to self or others.    HEAD pink palpebral conjunctivae, anicteric sclerae.  NECK supple, no apparent jugular venous distention.  No carotid bruit.  CARDIOVASCULAR not in distress or diaphoresis.  No bipedal edema.  Regular rate and rhythm.  No murmurs appreciated.  LUNGS not in distress or diaphoresis.  Not using accessory muscles.  Clear to auscultation bilaterally.  ABDOMEN soft, nontender.  BACK no costovertebral angle tenderness.  EXTREMITIES no clubbing, no cyanosis.  NEURO no facial asymmetry.  No apparent cranial nerve deficits.  Romberg negative.  Ambulating without need of assistance.  No apparent focal weakness.  No tremors.  PSYCH receptive, appropriate, and eager to maintain and improve quality of life.        LABORATORY results reviewed, will benefit from updating, patient agrees.        Assessment/Plan   Diagnoses and all orders for this visit:  Chronic idiopathic constipation  -     psyllium husk, aspartame, (Metamucil Sugar-Free, aspart,) 3.4 gram/5.8 gram powder; Take 1 Scoop by mouth 2 times a day.  -     lactulose 20 gram/30 mL oral solution; Please take 30 mL every 8-12 hours as needed for constipation.  Lots of fluids throughout the day please.  Anxiety with depression  Recurrent UTI (urinary tract infection)  Essential hypertension  Hypercholesterolemia with hypertriglyceridemia  Borderline hyperglycemia  [place] cardiac risk >20% in next 10 years  Vitamin D deficiency  Vitamin B12 deficiency  Class 1 obesity due to excess calories with serious comorbidity and body mass index (BMI) of 34.0 to 34.9 in adult       Thank you very much for coming.  I am very happy to see you.    I am sorry that you had a fall, but I am glad that you seem to have recovered without any lasting effects.    In the meantime,  I do understand that you are here because of CONSTIPATION, and the sensation that there is some fluttering or movement perhaps along the right side of your belly, perhaps under the ribs.  I examined your belly, and it is nice and soft without any lumps or bumps that I could appreciate.    I know it is early, but you could do your FASTING laboratory examinations anytime soon, any  facility, including [place].  The orders are already in the system.    In the meantime, you will benefit from METAMUCIL twice a day.  This is over-the-counter and comes in powder form or in packets or in capsules.  Take twice a day.  It will make you more regular without giving you any urgencies.  It is ideal, because you do not have to worry about hurrying to go to the bathroom.    I will also order you some LACTULOSE.  Take 30 mL or 2 tablespoons every 8 hours as needed for constipation.  When you take it, please make sure that you are within a bathroom that you can safely use.    Please keep your appointment in July, but please call sooner as needed.  Again, thank you very much for coming.  Please continue to take care of yourself and each other, and please continue to pray for our recovery from this pandemic.    I am glad to hear that you are daughter and your grandchildren are safe.  I hope you all have a good spring and summertime!            0

## 2024-04-22 NOTE — PATIENT INSTRUCTIONS
Thank you very much for coming.  I am very happy to see you.    I am sorry that you had a fall, but I am glad that you seem to have recovered without any lasting effects.    In the meantime, I do understand that you are here because of CONSTIPATION, and the sensation that there is some fluttering or movement perhaps along the right side of your belly, perhaps under the ribs.  I examined your belly, and it is nice and soft without any lumps or bumps that I could appreciate.    I know it is early, but you could do your FASTING laboratory examinations anytime soon, any  facility, including [place].  The orders are already in the system.    In the meantime, you will benefit from METAMUCIL twice a day.  This is over-the-counter and comes in powder form or in packets or in capsules.  Take twice a day.  It will make you more regular without giving you any urgencies.  It is ideal, because you do not have to worry about hurrying to go to the bathroom.    I will also order you some LACTULOSE.  Take 30 mL or 2 tablespoons every 8 hours as needed for constipation.  When you take it, please make sure that you are within a bathroom that you can safely use.    Please keep your appointment in July, but please call sooner as needed.  Again, thank you very much for coming.  Please continue to take care of yourself and each other, and please continue to pray for our recovery from this pandemic.    I am glad to hear that you are daughter and your grandchildren are safe.  I hope you all have a good spring and summertime!            0

## 2024-04-23 ENCOUNTER — LAB (OUTPATIENT)
Dept: LAB | Facility: LAB | Age: 79
End: 2024-04-23
Payer: MEDICARE

## 2024-04-23 DIAGNOSIS — G50.0 TRIGEMINAL NEURALGIA: ICD-10-CM

## 2024-04-23 DIAGNOSIS — E78.2 HYPERCHOLESTEROLEMIA WITH HYPERTRIGLYCERIDEMIA: ICD-10-CM

## 2024-04-23 DIAGNOSIS — E66.09 CLASS 1 OBESITY DUE TO EXCESS CALORIES WITH SERIOUS COMORBIDITY AND BODY MASS INDEX (BMI) OF 34.0 TO 34.9 IN ADULT: ICD-10-CM

## 2024-04-23 DIAGNOSIS — R73.9 BORDERLINE HYPERGLYCEMIA: ICD-10-CM

## 2024-04-23 DIAGNOSIS — I10 ESSENTIAL HYPERTENSION: ICD-10-CM

## 2024-04-23 DIAGNOSIS — E55.9 VITAMIN D DEFICIENCY: ICD-10-CM

## 2024-04-23 DIAGNOSIS — Z91.89 FRAMINGHAM CARDIAC RISK >20% IN NEXT 10 YEARS: ICD-10-CM

## 2024-04-23 DIAGNOSIS — R63.5 WEIGHT GAIN: ICD-10-CM

## 2024-04-23 LAB
25(OH)D3 SERPL-MCNC: 46 NG/ML (ref 30–100)
ALBUMIN SERPL BCP-MCNC: 4 G/DL (ref 3.4–5)
ALP SERPL-CCNC: 103 U/L (ref 33–136)
ALT SERPL W P-5'-P-CCNC: 13 U/L (ref 7–45)
ANION GAP SERPL CALC-SCNC: 14 MMOL/L (ref 10–20)
APPEARANCE UR: ABNORMAL
AST SERPL W P-5'-P-CCNC: 16 U/L (ref 9–39)
BASOPHILS # BLD AUTO: 0.02 X10*3/UL (ref 0–0.1)
BASOPHILS NFR BLD AUTO: 0.4 %
BILIRUB SERPL-MCNC: 0.8 MG/DL (ref 0–1.2)
BILIRUB UR STRIP.AUTO-MCNC: NEGATIVE MG/DL
BUN SERPL-MCNC: 16 MG/DL (ref 6–23)
CALCIUM SERPL-MCNC: 8.9 MG/DL (ref 8.6–10.3)
CHLORIDE SERPL-SCNC: 104 MMOL/L (ref 98–107)
CHOLEST SERPL-MCNC: 204 MG/DL (ref 0–199)
CHOLESTEROL/HDL RATIO: 3.8
CO2 SERPL-SCNC: 26 MMOL/L (ref 21–32)
COLOR UR: YELLOW
CREAT SERPL-MCNC: 0.71 MG/DL (ref 0.5–1.05)
EGFRCR SERPLBLD CKD-EPI 2021: 87 ML/MIN/1.73M*2
EOSINOPHIL # BLD AUTO: 0.19 X10*3/UL (ref 0–0.4)
EOSINOPHIL NFR BLD AUTO: 3.9 %
ERYTHROCYTE [DISTWIDTH] IN BLOOD BY AUTOMATED COUNT: 12.4 % (ref 11.5–14.5)
EST. AVERAGE GLUCOSE BLD GHB EST-MCNC: 117 MG/DL
GLUCOSE SERPL-MCNC: 109 MG/DL (ref 74–99)
GLUCOSE UR STRIP.AUTO-MCNC: NEGATIVE MG/DL
HBA1C MFR BLD: 5.7 %
HCT VFR BLD AUTO: 41 % (ref 36–46)
HDLC SERPL-MCNC: 53.5 MG/DL
HGB BLD-MCNC: 13.9 G/DL (ref 12–16)
HOLD SPECIMEN: NORMAL
IMM GRANULOCYTES # BLD AUTO: 0.04 X10*3/UL (ref 0–0.5)
IMM GRANULOCYTES NFR BLD AUTO: 0.8 % (ref 0–0.9)
KETONES UR STRIP.AUTO-MCNC: NEGATIVE MG/DL
LDLC SERPL CALC-MCNC: 121 MG/DL
LEUKOCYTE ESTERASE UR QL STRIP.AUTO: ABNORMAL
LYMPHOCYTES # BLD AUTO: 1.2 X10*3/UL (ref 0.8–3)
LYMPHOCYTES NFR BLD AUTO: 24.9 %
MAGNESIUM SERPL-MCNC: 2.04 MG/DL (ref 1.6–2.4)
MCH RBC QN AUTO: 31.4 PG (ref 26–34)
MCHC RBC AUTO-ENTMCNC: 33.9 G/DL (ref 32–36)
MCV RBC AUTO: 93 FL (ref 80–100)
MONOCYTES # BLD AUTO: 0.71 X10*3/UL (ref 0.05–0.8)
MONOCYTES NFR BLD AUTO: 14.7 %
NEUTROPHILS # BLD AUTO: 2.66 X10*3/UL (ref 1.6–5.5)
NEUTROPHILS NFR BLD AUTO: 55.3 %
NITRITE UR QL STRIP.AUTO: NEGATIVE
NON HDL CHOLESTEROL: 151 MG/DL (ref 0–149)
NRBC BLD-RTO: 0 /100 WBCS (ref 0–0)
PH UR STRIP.AUTO: 7 [PH]
PLATELET # BLD AUTO: 237 X10*3/UL (ref 150–450)
POTASSIUM SERPL-SCNC: 4 MMOL/L (ref 3.5–5.3)
PROT SERPL-MCNC: 6.3 G/DL (ref 6.4–8.2)
PROT UR STRIP.AUTO-MCNC: ABNORMAL MG/DL
RBC # BLD AUTO: 4.42 X10*6/UL (ref 4–5.2)
RBC # UR STRIP.AUTO: NEGATIVE /UL
RBC #/AREA URNS AUTO: ABNORMAL /HPF
SODIUM SERPL-SCNC: 140 MMOL/L (ref 136–145)
SP GR UR STRIP.AUTO: 1.01
SQUAMOUS #/AREA URNS AUTO: ABNORMAL /HPF
TRIGL SERPL-MCNC: 148 MG/DL (ref 0–149)
TSH SERPL-ACNC: 0.89 MIU/L (ref 0.44–3.98)
UROBILINOGEN UR STRIP.AUTO-MCNC: <2 MG/DL
VLDL: 30 MG/DL (ref 0–40)
WBC # BLD AUTO: 4.8 X10*3/UL (ref 4.4–11.3)
WBC #/AREA URNS AUTO: ABNORMAL /HPF

## 2024-04-23 PROCEDURE — 80053 COMPREHEN METABOLIC PANEL: CPT

## 2024-04-23 PROCEDURE — 83735 ASSAY OF MAGNESIUM: CPT

## 2024-04-23 PROCEDURE — 81001 URINALYSIS AUTO W/SCOPE: CPT

## 2024-04-23 PROCEDURE — 82306 VITAMIN D 25 HYDROXY: CPT

## 2024-04-23 PROCEDURE — 80061 LIPID PANEL: CPT

## 2024-04-23 PROCEDURE — 36415 COLL VENOUS BLD VENIPUNCTURE: CPT

## 2024-04-23 PROCEDURE — 83036 HEMOGLOBIN GLYCOSYLATED A1C: CPT

## 2024-04-23 PROCEDURE — 84443 ASSAY THYROID STIM HORMONE: CPT

## 2024-04-23 PROCEDURE — 87186 SC STD MICRODIL/AGAR DIL: CPT

## 2024-04-23 PROCEDURE — 85025 COMPLETE CBC W/AUTO DIFF WBC: CPT

## 2024-04-23 PROCEDURE — 87086 URINE CULTURE/COLONY COUNT: CPT

## 2024-04-26 LAB — BACTERIA UR CULT: ABNORMAL

## 2024-05-06 ENCOUNTER — APPOINTMENT (OUTPATIENT)
Dept: ORTHOPEDIC SURGERY | Facility: CLINIC | Age: 79
End: 2024-05-06
Payer: MEDICARE

## 2024-06-11 ENCOUNTER — OFFICE VISIT (OUTPATIENT)
Dept: PRIMARY CARE | Facility: CLINIC | Age: 79
End: 2024-06-11
Payer: MEDICARE

## 2024-06-11 VITALS
WEIGHT: 191.6 LBS | OXYGEN SATURATION: 96 % | SYSTOLIC BLOOD PRESSURE: 130 MMHG | HEART RATE: 72 BPM | HEIGHT: 63 IN | BODY MASS INDEX: 33.95 KG/M2 | DIASTOLIC BLOOD PRESSURE: 68 MMHG

## 2024-06-11 DIAGNOSIS — N39.0 RECURRENT UTI: ICD-10-CM

## 2024-06-11 DIAGNOSIS — R73.9 BORDERLINE HYPERGLYCEMIA: ICD-10-CM

## 2024-06-11 DIAGNOSIS — L71.9 ROSACEA, ACNE: Chronic | ICD-10-CM

## 2024-06-11 DIAGNOSIS — E78.2 HYPERCHOLESTEROLEMIA WITH HYPERTRIGLYCERIDEMIA: ICD-10-CM

## 2024-06-11 DIAGNOSIS — R19.7 DIARRHEA, UNSPECIFIED TYPE: ICD-10-CM

## 2024-06-11 DIAGNOSIS — L24.7 IRRITANT CONTACT DERMATITIS DUE TO PLANTS, EXCEPT FOOD: Primary | ICD-10-CM

## 2024-06-11 PROCEDURE — 1160F RVW MEDS BY RX/DR IN RCRD: CPT | Performed by: INTERNAL MEDICINE

## 2024-06-11 PROCEDURE — 99213 OFFICE O/P EST LOW 20 MIN: CPT | Performed by: INTERNAL MEDICINE

## 2024-06-11 PROCEDURE — 3075F SYST BP GE 130 - 139MM HG: CPT | Performed by: INTERNAL MEDICINE

## 2024-06-11 PROCEDURE — 1159F MED LIST DOCD IN RCRD: CPT | Performed by: INTERNAL MEDICINE

## 2024-06-11 PROCEDURE — 1157F ADVNC CARE PLAN IN RCRD: CPT | Performed by: INTERNAL MEDICINE

## 2024-06-11 PROCEDURE — 1036F TOBACCO NON-USER: CPT | Performed by: INTERNAL MEDICINE

## 2024-06-11 PROCEDURE — 3078F DIAST BP <80 MM HG: CPT | Performed by: INTERNAL MEDICINE

## 2024-06-11 PROCEDURE — 96372 THER/PROPH/DIAG INJ SC/IM: CPT | Performed by: INTERNAL MEDICINE

## 2024-06-11 RX ORDER — PREDNISONE 10 MG/1
TABLET ORAL
Qty: 21 TABLET | Refills: 0 | Status: SHIPPED | OUTPATIENT
Start: 2024-06-11

## 2024-06-11 RX ORDER — LOPERAMIDE HCL 2 MG
TABLET ORAL
Qty: 30 TABLET | Refills: 0 | Status: SHIPPED | OUTPATIENT
Start: 2024-06-11

## 2024-06-11 RX ORDER — TRIAMCINOLONE ACETONIDE 0.25 MG/G
OINTMENT TOPICAL
Qty: 60 G | Refills: 0 | Status: SHIPPED | OUTPATIENT
Start: 2024-06-11

## 2024-06-11 RX ORDER — METHYLPREDNISOLONE ACETATE 40 MG/ML
40 INJECTION, SUSPENSION INTRA-ARTICULAR; INTRALESIONAL; INTRAMUSCULAR; SOFT TISSUE ONCE
Status: COMPLETED | OUTPATIENT
Start: 2024-06-11 | End: 2024-06-11

## 2024-06-11 RX ADMIN — METHYLPREDNISOLONE ACETATE 40 MG: 40 INJECTION, SUSPENSION INTRA-ARTICULAR; INTRALESIONAL; INTRAMUSCULAR; SOFT TISSUE at 12:32

## 2024-06-11 NOTE — PATIENT INSTRUCTIONS
Thank you very much for coming.  I am very happy to see you.    As you might recall, you have very sensitive skin.  You either might have gotten bit by a bug, brushed by a caterpillar, or rubbed against a plant.    It is less likely that this is shingles.    In any event, the treatment is very similar for all of these:  STEROID INJECTION by the muscle now, followed by PREDNISONE 10 mg tablets,  Take 2 tablets with food now,  Then take 3 tablets with breakfast for 2 days,  Then take 2 tablets with breakfast for 3 days,  Then take 1 tablet with breakfast until all gone.    TRIAMCINOLONE ointment, as you have used before.  Thin layer only, to the affected area/rash 3 times a day.  Please drink lots of fluids throughout the day.  Light dry dressing only as needed.  Otherwise, if you can leave the area to air dry, that would be better.  Lots of rest and sleep will also help your skin recover faster.    I do understand that he also started to experience DIARRHEA.  Try to avoid fat and dairy.  Drink lots of fluids throughout the day, including Gatorade.  IMODIUM/LOPERAMIDE 2 mg tablets, take 2 tablets every 6-8 hours as needed for diarrhea.  No more than 6 tablets please in 24 hours.  Watch out for constipation!    Please let me know if you start having any urine symptoms.    Otherwise, you do have a history of a skin condition called ROSACEA, which has not what you have at this time.    I reviewed your FASTING laboratory examinations from [facility], and they were all stable.  No further changes recommended at this time.  Please keep your appointment in July, but call sooner as needed.    Please continue to take care of yourself and your family, and please continue to pray for our recovery from this pandemic.  See you in July.  Take care and God bless.            0

## 2024-06-11 NOTE — PROGRESS NOTES
"Subjective   Patient ID: Elvira Dunne is a 79 y.o. female who presents for Insect Bite (Patient presented today with c/o insect bite.).    HPI   Recurrence of skin lesion, this time not painful, but more pruritic.  States that she was working outside in the garden, but does not recall brushing against foliage.  She suspects that she may have been bit by an insect instead.    Fortunately, not accompanied by odynophagia or dysphagia.  Likewise, no shortness of breath, no wheezing.  Has had some similar skin changes in the past.    Compliant with medications, tolerating regimens.  Has noted some DIARRHEA described as watery stools occurring almost at the same time.  Otherwise, appetite preserved, no abdominal distress.  No other skin changes.  No dysuria, flank, suprapubic pain.  Also, no headache.  No chest pain.  No shortness of breath.  CONSTITUTIONALLY, no fever, no chills.  No night sweats.  No lingering anorexia or nausea.  No apparent lymphadenopathy.  No apparent weight loss.        Review of Systems  Review of systems as in history of present illness, and otherwise, reviewed separately as well, and was unremarkable/negative/noncontributory.        Objective   /68 (BP Location: Left arm, Patient Position: Sitting, BP Cuff Size: Adult)   Pulse 72   Ht 1.6 m (5' 3\")   Wt 86.9 kg (191 lb 9.6 oz)   SpO2 96%   BMI 33.94 kg/m²     Physical Exam  In otherwise good spirits.  Not in distress or diaphoresis.  Alert, oriented x 3.  Amiable.  Not unkempt.  Appropriate.  Eager to improve quality of life.  Does not wish harm to self or others.    HEAD pink palpebral conjunctivae, anicteric sclerae.  Mucous membranes moist.  NECK supple, no apparent jugular venous distention.  CARDIOVASCULAR not in distress or diaphoresis.  No bipedal edema.  LUNGS not in distress or diaphoresis.  Not using accessory muscles.  Clear to auscultation, with no wheezing noted.  ABDOMEN soft, nontender.  BACK no costovertebral angle " tenderness.  EXTREMITIES no clubbing, no cyanosis.  SKIN with VESICULAR patch along the extensor surface of the upper arm, with clear fluid noted.  Surrounding induration perhaps, with overlying excoriation.  NEURO no facial asymmetry.  No apparent cranial nerve deficits.  Romberg negative.  Ambulating without need of assistance.  No apparent focal weakness.  No tremors.  PSYCH receptive, appropriate, and eager to maintain and improve quality of life.        Laboratory results reviewed.        Assessment/Plan   Diagnoses and all orders for this visit:  Irritant contact dermatitis due to plants, except food  -     methylPREDNISolone acetate (DEPO-Medrol) injection 40 mg  -     predniSONE (Deltasone) 10 mg tablet; Take 2 tablets with food now, then take 3 tablets with breakfast for 2 days, then take 2 tablets with breakfast for 3 days, then take 1 tablet with breakfast until gone.  Lots of fluids throughout the day.  -     triamcinolone (Kenalog) 0.025 % ointment; Please apply a thin layer to rash 3 times a day.  Thin layer only please.  Do not cover rash with bandage.  Thank you.  Diarrhea, unspecified type  -     loperamide (Imodium A-D) 2 mg tablet; Please take 2 tablets by mouth every 6-8 hours as needed for DIARRHEA.  No more than 6 tablets please in 24 hours.  Watch out for CONSTIPATION.  Lots of fluids through the day.  Recurrent UTI  Rosacea, acne  Borderline hyperglycemia  Hypercholesterolemia with hypertriglyceridemia       Thank you very much for coming.  I am very happy to see you.    As you might recall, you have very sensitive skin.  You either might have gotten bit by a bug, brushed by a caterpillar, or rubbed against a plant.    It is less likely that this is shingles.    In any event, the treatment is very similar for all of these:  STEROID INJECTION by the muscle now, followed by PREDNISONE 10 mg tablets,  Take 2 tablets with food now,  Then take 3 tablets with breakfast for 2 days,  Then take 2  tablets with breakfast for 3 days,  Then take 1 tablet with breakfast until all gone.    TRIAMCINOLONE ointment, as you have used before.  Thin layer only, to the affected area/rash 3 times a day.  Please drink lots of fluids throughout the day.  Light dry dressing only as needed.  Otherwise, if you can leave the area to air dry, that would be better.  Lots of rest and sleep will also help your skin recover faster.    I do understand that he also started to experience DIARRHEA.  Try to avoid fat and dairy.  Drink lots of fluids throughout the day, including Gatorade.  IMODIUM/LOPERAMIDE 2 mg tablets, take 2 tablets every 6-8 hours as needed for diarrhea.  No more than 6 tablets please in 24 hours.  Watch out for constipation!    Please let me know if you start having any urine symptoms.    Otherwise, you do have a history of a skin condition called ROSACEA, which has not what you have at this time.    I reviewed your FASTING laboratory examinations from [facility], and they were all stable.  No further changes recommended at this time.  Please keep your appointment in July, but call sooner as needed.    Please continue to take care of yourself and your family, and please continue to pray for our recovery from this pandemic.  See you in July.  Take care and God bless.            0

## 2024-06-22 ENCOUNTER — HOSPITAL ENCOUNTER (EMERGENCY)
Facility: HOSPITAL | Age: 79
Discharge: HOME | End: 2024-06-22
Payer: MEDICARE

## 2024-06-22 ENCOUNTER — APPOINTMENT (OUTPATIENT)
Dept: RADIOLOGY | Facility: HOSPITAL | Age: 79
End: 2024-06-22
Payer: MEDICARE

## 2024-06-22 VITALS
HEIGHT: 63 IN | DIASTOLIC BLOOD PRESSURE: 88 MMHG | BODY MASS INDEX: 32.78 KG/M2 | SYSTOLIC BLOOD PRESSURE: 189 MMHG | OXYGEN SATURATION: 98 % | RESPIRATION RATE: 18 BRPM | HEART RATE: 83 BPM | WEIGHT: 185 LBS | TEMPERATURE: 97.5 F

## 2024-06-22 DIAGNOSIS — T14.8XXA MUSCLE STRAIN: Primary | ICD-10-CM

## 2024-06-22 PROCEDURE — 72125 CT NECK SPINE W/O DYE: CPT | Performed by: RADIOLOGY

## 2024-06-22 PROCEDURE — 2500000001 HC RX 250 WO HCPCS SELF ADMINISTERED DRUGS (ALT 637 FOR MEDICARE OP): Performed by: PHYSICIAN ASSISTANT

## 2024-06-22 PROCEDURE — 72125 CT NECK SPINE W/O DYE: CPT

## 2024-06-22 PROCEDURE — 96372 THER/PROPH/DIAG INJ SC/IM: CPT | Performed by: PHYSICIAN ASSISTANT

## 2024-06-22 PROCEDURE — 99284 EMERGENCY DEPT VISIT MOD MDM: CPT | Mod: 25

## 2024-06-22 PROCEDURE — 2500000004 HC RX 250 GENERAL PHARMACY W/ HCPCS (ALT 636 FOR OP/ED): Performed by: PHYSICIAN ASSISTANT

## 2024-06-22 RX ORDER — CYCLOBENZAPRINE HCL 10 MG
10 TABLET ORAL 3 TIMES DAILY PRN
Qty: 15 TABLET | Refills: 0 | Status: SHIPPED | OUTPATIENT
Start: 2024-06-22 | End: 2024-06-27

## 2024-06-22 RX ORDER — OXYCODONE AND ACETAMINOPHEN 5; 325 MG/1; MG/1
1 TABLET ORAL ONCE
Status: COMPLETED | OUTPATIENT
Start: 2024-06-22 | End: 2024-06-22

## 2024-06-22 RX ORDER — DIAZEPAM 5 MG/ML
2.5 INJECTION, SOLUTION INTRAMUSCULAR; INTRAVENOUS ONCE
Status: COMPLETED | OUTPATIENT
Start: 2024-06-22 | End: 2024-06-22

## 2024-06-22 RX ORDER — PREDNISONE 50 MG/1
50 TABLET ORAL DAILY
Qty: 5 TABLET | Refills: 0 | Status: SHIPPED | OUTPATIENT
Start: 2024-06-22 | End: 2024-06-27

## 2024-06-22 RX ORDER — NAPROXEN SODIUM 550 MG/1
550 TABLET ORAL
Qty: 14 TABLET | Refills: 0 | Status: SHIPPED | OUTPATIENT
Start: 2024-06-22 | End: 2024-06-29

## 2024-06-22 RX ADMIN — DIAZEPAM 2.5 MG: 5 INJECTION INTRAMUSCULAR; INTRAVENOUS at 10:15

## 2024-06-22 RX ADMIN — METHYLPREDNISOLONE SODIUM SUCCINATE 125 MG: 125 INJECTION, POWDER, FOR SOLUTION INTRAMUSCULAR; INTRAVENOUS at 10:15

## 2024-06-22 RX ADMIN — OXYCODONE HYDROCHLORIDE AND ACETAMINOPHEN 1 TABLET: 5; 325 TABLET ORAL at 10:15

## 2024-06-22 ASSESSMENT — LIFESTYLE VARIABLES
HAVE YOU EVER FELT YOU SHOULD CUT DOWN ON YOUR DRINKING: NO
EVER HAD A DRINK FIRST THING IN THE MORNING TO STEADY YOUR NERVES TO GET RID OF A HANGOVER: NO
TOTAL SCORE: 0
HAVE PEOPLE ANNOYED YOU BY CRITICIZING YOUR DRINKING: NO
EVER FELT BAD OR GUILTY ABOUT YOUR DRINKING: NO

## 2024-06-22 ASSESSMENT — PAIN - FUNCTIONAL ASSESSMENT
PAIN_FUNCTIONAL_ASSESSMENT: 0-10
PAIN_FUNCTIONAL_ASSESSMENT: 0-10

## 2024-06-22 ASSESSMENT — PAIN SCALES - GENERAL
PAINLEVEL_OUTOF10: 4
PAINLEVEL_OUTOF10: 6

## 2024-06-22 ASSESSMENT — COLUMBIA-SUICIDE SEVERITY RATING SCALE - C-SSRS
6. HAVE YOU EVER DONE ANYTHING, STARTED TO DO ANYTHING, OR PREPARED TO DO ANYTHING TO END YOUR LIFE?: NO
2. HAVE YOU ACTUALLY HAD ANY THOUGHTS OF KILLING YOURSELF?: NO
1. IN THE PAST MONTH, HAVE YOU WISHED YOU WERE DEAD OR WISHED YOU COULD GO TO SLEEP AND NOT WAKE UP?: NO

## 2024-06-22 ASSESSMENT — PAIN DESCRIPTION - ORIENTATION: ORIENTATION: RIGHT

## 2024-06-22 ASSESSMENT — PAIN DESCRIPTION - PAIN TYPE: TYPE: ACUTE PAIN

## 2024-06-22 ASSESSMENT — PAIN DESCRIPTION - LOCATION: LOCATION: NECK

## 2024-06-22 ASSESSMENT — PAIN DESCRIPTION - PROGRESSION: CLINICAL_PROGRESSION: GRADUALLY IMPROVING

## 2024-06-22 NOTE — ED PROVIDER NOTES
"HPI   Chief Complaint   Patient presents with    Neck Pain     Neck pain X3-4 days, pt states \"I had rotator cuff problems and I'm still lifting heavy things\".  Pt did not take any medication pta       A 79-year-old female patient comes in the emergency department today with complaints of neck pain over the last 3 to 4 days.  She describes that she been doing a lot of lifting in the kitchen with different items.  She rates her pain a 6-7 out of 10 on the pain scale.  Described as achy.  Range of motion of the neck does seem to increase the severity of the pain.  Pain radiates into the left trapezius musculature.  For this purpose comes in the emergency department today further evaluation.  Otherwise no other complaints this present time.  Denies any inciting specific injury.                          Miles Coma Scale Score: 15                     Patient History   Past Medical History:   Diagnosis Date    Arthritis     Glaucoma     Nephrolithiasis     Personal history of other diseases of the musculoskeletal system and connective tissue 10/05/2020    History of arthritis    Personal history of other infectious and parasitic diseases 2016    History of herpes simplex infection    Personal history of pneumonia (recurrent) 2016    History of pneumonia    Unspecified visual loss 10/05/2020    Vision problems    Urinary tract infection     Viral meningitis, unspecified ([facility]) 2016    Viral meningitis     Past Surgical History:   Procedure Laterality Date     SECTION, CLASSIC  2016     Section     SECTION, LOW TRANSVERSE      CHOLECYSTECTOMY      COLONOSCOPY  2016    Complete Colonoscopy    GALLBLADDER SURGERY  2016    Gallbladder Surgery    JOINT REPLACEMENT      OTHER SURGICAL HISTORY  2016    Wrist Surgery    OTHER SURGICAL HISTORY  10/05/2020    Joint replacement procedure    TONSILLECTOMY  2016    Tonsillectomy     Family History   Problem " Relation Name Age of Onset    Stroke Mother      COPD Father      Dementia Father       Social History     Tobacco Use    Smoking status: Never    Smokeless tobacco: Never   Vaping Use    Vaping status: Not on file   Substance Use Topics    Alcohol use: Not on file     Comment: socially    Drug use: Never       Physical Exam   ED Triage Vitals [06/22/24 0934]   Temperature Heart Rate Respirations BP   36.4 °C (97.5 °F) 83 18 (!) 189/88      Pulse Ox Temp Source Heart Rate Source Patient Position   98 % Temporal Monitor Lying      BP Location FiO2 (%)     Left arm --       Physical Exam  Constitutional:       Appearance: Normal appearance.   HENT:      Head: Normocephalic and atraumatic.      Nose: Nose normal.   Eyes:      Extraocular Movements: Extraocular movements intact.      Conjunctiva/sclera: Conjunctivae normal.      Pupils: Pupils are equal, round, and reactive to light.   Cardiovascular:      Rate and Rhythm: Normal rate and regular rhythm.   Pulmonary:      Effort: Pulmonary effort is normal. No respiratory distress.      Breath sounds: Normal breath sounds. No stridor. No wheezing.   Musculoskeletal:         General: Tenderness (Left cervical paraspinal tenderness as well as left trapezius tenderness.) present.      Cervical back: Normal range of motion.      Comments: Pain turning head to right word less pain turning leftward, meningeal signs negative   Skin:     General: Skin is warm and dry.   Neurological:      General: No focal deficit present.      Mental Status: She is alert and oriented to person, place, and time. Mental status is at baseline.   Psychiatric:         Mood and Affect: Mood normal.         ED Course & MDM   Diagnoses as of 06/22/24 1125   Muscle strain       Medical Decision Making  A 79-year-old female patient comes in the emergency department today with complaints of neck pain over the last 3 to 4 days.  She describes that she been doing a lot of lifting in the kitchen with  different items.  She rates her pain a 6-7 out of 10 on the pain scale.  Described as achy.  Range of motion of the neck does seem to increase the severity of the pain.  Pain radiates into the left trapezius musculature.  For this purpose comes in the emergency department today further evaluation.  Otherwise no other complaints this present time.  Denies any inciting specific injury.    CT scan of the C-spine ordered to rule out any acute bony abnormality as well as medications ordered for the patient.  IM Solu-Medrol p.o. oxycodone, IM Valium.    Patient has some spinal canal stenosis as well as foraminal narrowing.  Let patient follow-up with orthopedics.  Will provide her with p.o. medications for home.  Patient agrees with this plan expressed verbal understanding.  All questions answered.    Historians patient    Diagnosis: Muscle strain      Labs Reviewed - No data to display     CT cervical spine wo IV contrast   Final Result   No acute osseous abnormality of the cervical spine.        Concern for moderate to severe spinal canal stenosis at C6-C7   secondary to cervical spondylosis and focal calcification of the   ligamentum flavum.        Moderate left neural foraminal narrowing at C3-C4. Additional   scattered mild-to-moderate neural foraminal narrowing as above.        MACRO:   None        Signed by: Joseph Luz 6/22/2024 10:57 AM   Dictation workstation:   [id number]            Procedure  Procedures     Livan Castillo PA-C  06/22/24 1125

## 2024-06-24 ENCOUNTER — OFFICE VISIT (OUTPATIENT)
Dept: ORTHOPEDIC SURGERY | Facility: CLINIC | Age: 79
End: 2024-06-24
Payer: MEDICARE

## 2024-06-24 DIAGNOSIS — M48.02 STENOSIS OF CERVICAL SPINE: ICD-10-CM

## 2024-06-24 DIAGNOSIS — M47.22 CERVICAL RADICULOPATHY DUE TO DEGENERATIVE JOINT DISEASE OF SPINE: ICD-10-CM

## 2024-06-24 PROCEDURE — 1157F ADVNC CARE PLAN IN RCRD: CPT | Performed by: INTERNAL MEDICINE

## 2024-06-24 PROCEDURE — 99213 OFFICE O/P EST LOW 20 MIN: CPT | Performed by: INTERNAL MEDICINE

## 2024-06-27 ENCOUNTER — EVALUATION (OUTPATIENT)
Dept: PHYSICAL THERAPY | Facility: CLINIC | Age: 79
End: 2024-06-27
Payer: MEDICARE

## 2024-06-27 DIAGNOSIS — M47.22 CERVICAL RADICULOPATHY DUE TO DEGENERATIVE JOINT DISEASE OF SPINE: ICD-10-CM

## 2024-06-27 DIAGNOSIS — M48.02 STENOSIS OF CERVICAL SPINE: Primary | ICD-10-CM

## 2024-06-27 PROCEDURE — 97110 THERAPEUTIC EXERCISES: CPT | Mod: GP

## 2024-06-27 PROCEDURE — 97161 PT EVAL LOW COMPLEX 20 MIN: CPT | Mod: GP

## 2024-06-27 ASSESSMENT — ENCOUNTER SYMPTOMS
OCCASIONAL FEELINGS OF UNSTEADINESS: 0
LOSS OF SENSATION IN FEET: 0
DEPRESSION: 0

## 2024-06-27 NOTE — PROGRESS NOTES
"Patient Name: Elvira Dunne  MRN: [id number]  Time Calculation  Start Time: 0837  Stop Time: 0914  Time Calculation (min): 37 min  PT Evaluation Time Entry  PT Evaluation (Low) Time Entry: 25  PT Therapeutic Procedures Time Entry  Therapeutic Exercise Time Entry: 12                   Current Problem  1. Cervical radiculopathy due to degenerative joint disease of spine  Referral to Physical Therapy      2. Stenosis of cervical spine  Referral to Physical Therapy        Insurance  MEDICARE/MMOSUP 2230 ($ 0 USED ) COPAY 0 (MET) COVERAGE 80/100 OOP 0 MMOSUP TO FOLLOW MEDICARE NO AUTH REQ [id number]       Subjective     General: Pt is a 79 y.o. female presenting to clinic with c/o acute onset neck pain. NO specific trauma related to this pain, but started a bit over 2-3 week ago. Has a history of trigeminal neuralgia, but endorses these symptoms feel definitively different. Went to the ED where CT was performed, which revealed mod-sev stenosis at C6-C7 among other things. Pt previously was seen at this clinic but did not follow up for very long as her deficits did not persist, was a eval and discharge. Pt has an MRI scheduled for 7/5. Symptoms mainly present on the left lower portion of the neck, some decreased sensation noted at the area, at times feels like pins and needles focally. Most notable when she turns her head to the left. Denies any radicular pain.  Currently having difficulty with lifting objects, performing tasks in the kitchen/reaching overhead. No major issues sleeping if she is able to find a comfortable position. Main goal with therapy at this time is to reduce pain and restore PLOF.       Pertinent hx: Trigeminal neuralgia, Travis RTC injury over the last 10 years    Pt is right handed    Precautions:    Pain:  Current:  0/10  High: 5/10      Reviewed medical screening form with pt and medical screening assessed    Imaging:     Neck CT 6/22    \"IMPRESSION:  No acute osseous abnormality of the " "cervical spine.      Concern for moderate to severe spinal canal stenosis at C6-C7  secondary to cervical spondylosis and focal calcification of the  ligamentum flavum.      Moderate left neural foraminal narrowing at C3-C4. Additional  scattered mild-to-moderate neural foraminal narrowing as above.\"    Objective     Spine Musculoskeletal Exam    Range of Motion    Cervical Spine       Cervical flexion: 58.     Cervical extension: 24.       Right      Neck lateral bend right: 25.       Neck rotation right: 63.       Left      Neck lateral bend left: 18.       Neck rotation left: 59. Lateral rotation detail: pain.      Strength    Cervical Spine      Right      Shoulder external rotation: 4+/5.       Biceps: 5/5.       Triceps: 5/5.       Left      Shoulder external rotation: 4-/5.       Biceps: 4+/5.       Triceps: 4+/5.        Shoulder Musculoskeletal Exam    Palpation    Palpation additional comments: L 1st rib very tender(+++) to mob; R also tender but less aggravating (++)    Hypomotility to PA mob of lower cervical spine, as well as lateral glides bilaterally. Increased tenderness L->R    Strength    Right      External rotation: 4+/5.       Internal rotation: 5/5.       Abduction: 4+/5.       Biceps: 5/5.       Triceps: 5/5.     Left      External rotation: 4-/5.       Internal rotation: 4+/5.       Abduction: 4/5.       Biceps: 4+/5.       Triceps: 4+/5.     Strength additional comments: 4+ shoulder flexion wili               **spurlings* (- bilaterally)  *hoffmans* (- bilaterally)    No major change in symptoms with manual traction, pt did endorse it felt relatively good       strength via dynamometer    R  26  22    L  19  20        Outcome Measures:  NDI:  18%     Treatment: See HEP below    EDUCATION/HEP:  Pt educated on potential cause of symptoms, POC, and HEP. Pt denied any questions at this time    Access Code: [email or web address]  URL: [email or web address]  Date: 06/27/2024  Prepared " by: Wilber Torres    Exercises  - Seated Upper Trapezius Stretch  - 2 x daily - 7 x weekly - 1 sets - 4 reps - 15 hold  - Gentle Levator Scapulae Stretch  - 2 x daily - 7 x weekly - 1 sets - 4 reps - 15 hold  - Cervical Extension AROM with Strap  - 1 x daily - 5 x weekly - 1 sets    Assessment:     Pt is a 79 y.o. female presenting with L sided cervical pain/tightness, consistent with the referring diagnosis. Examination reveals the following primary deficits:  decreased AROM of the cervical spine into extension and sidebending, decreased L shoulder weakness into ER, and decreased cervical joint mobility. Negative spurlings, hoffmans bilaterally, no radicular symptoms produced or endorsed prior to evaluation. These deficits have lead to primary functional impairments with sleeping and lifting overhead  . Recommend skilled PT to address the aforementioned deficits and allow pt to restore PLOF and maximize functional capacity. Anticipate good prognosis for recovery of function      Clinical Presentation: Stable   Level of Complexity: Low     Goals:    Pt to be IND with HEP  2. Pt cervical SB AROM to 30 degrees bilaterally for improved ability to tolerate different positions while sleeping   3. Pt L shoulder ER MMT to 4+ /5 to demonstrate improved ability to lift overhead  4. Pt to report maximum pain at less than or equal to 2/10 to demonstrate reduction in symptom magnitude.   5. Pt to self report NDI score of less than or equal to 6% to demonstrate statistically significant improvement in function.     Plan  1-2x/week for 4 weeks     Trial mechanical traction, cervical mobs; postural education for symptom management, RTC strengthening     Planned interventions include: blood flow restriction, aquatic therapy, biofeedback, cryotherapy, dry needling, edema control, education/instruction, electrical stimulation, gait training, home program, hot pack, kinesiotaping, manual therapy, neuromuscular re-education, self  care/home management, therapeutic activities, therapeutic exercises, ultrasound and vasopneumatic device w/ cold.

## 2024-07-05 ENCOUNTER — HOSPITAL ENCOUNTER (OUTPATIENT)
Dept: RADIOLOGY | Facility: CLINIC | Age: 79
Discharge: HOME | End: 2024-07-05
Payer: MEDICARE

## 2024-07-05 DIAGNOSIS — M48.02 STENOSIS OF CERVICAL SPINE: ICD-10-CM

## 2024-07-05 DIAGNOSIS — M47.22 CERVICAL RADICULOPATHY DUE TO DEGENERATIVE JOINT DISEASE OF SPINE: ICD-10-CM

## 2024-07-05 PROCEDURE — 72141 MRI NECK SPINE W/O DYE: CPT

## 2024-07-08 ENCOUNTER — APPOINTMENT (OUTPATIENT)
Dept: PRIMARY CARE | Facility: CLINIC | Age: 79
End: 2024-07-08
Payer: MEDICARE

## 2024-07-08 VITALS
HEART RATE: 74 BPM | SYSTOLIC BLOOD PRESSURE: 124 MMHG | DIASTOLIC BLOOD PRESSURE: 70 MMHG | HEIGHT: 63 IN | WEIGHT: 189 LBS | BODY MASS INDEX: 33.49 KG/M2 | OXYGEN SATURATION: 96 %

## 2024-07-08 DIAGNOSIS — Z91.89 FRAMINGHAM CARDIAC RISK >20% IN NEXT 10 YEARS: ICD-10-CM

## 2024-07-08 DIAGNOSIS — G25.81 RESTLESS LEGS SYNDROME: ICD-10-CM

## 2024-07-08 DIAGNOSIS — I10 ESSENTIAL HYPERTENSION: ICD-10-CM

## 2024-07-08 DIAGNOSIS — M54.12 CERVICAL RADICULOPATHY: Primary | ICD-10-CM

## 2024-07-08 DIAGNOSIS — E78.2 HYPERCHOLESTEROLEMIA WITH HYPERTRIGLYCERIDEMIA: ICD-10-CM

## 2024-07-08 DIAGNOSIS — G50.0 TRIGEMINAL NEURALGIA: ICD-10-CM

## 2024-07-08 DIAGNOSIS — E66.09 CLASS 1 OBESITY DUE TO EXCESS CALORIES WITH SERIOUS COMORBIDITY AND BODY MASS INDEX (BMI) OF 34.0 TO 34.9 IN ADULT: ICD-10-CM

## 2024-07-08 DIAGNOSIS — E55.9 VITAMIN D DEFICIENCY: ICD-10-CM

## 2024-07-08 DIAGNOSIS — R73.9 BORDERLINE HYPERGLYCEMIA: ICD-10-CM

## 2024-07-08 DIAGNOSIS — Z86.19 HISTORY OF HERPES SIMPLEX TYPE 2 INFECTION: ICD-10-CM

## 2024-07-08 PROCEDURE — 1157F ADVNC CARE PLAN IN RCRD: CPT | Performed by: INTERNAL MEDICINE

## 2024-07-08 PROCEDURE — 99213 OFFICE O/P EST LOW 20 MIN: CPT | Performed by: INTERNAL MEDICINE

## 2024-07-08 PROCEDURE — 1159F MED LIST DOCD IN RCRD: CPT | Performed by: INTERNAL MEDICINE

## 2024-07-08 PROCEDURE — 3078F DIAST BP <80 MM HG: CPT | Performed by: INTERNAL MEDICINE

## 2024-07-08 PROCEDURE — 1036F TOBACCO NON-USER: CPT | Performed by: INTERNAL MEDICINE

## 2024-07-08 PROCEDURE — 1160F RVW MEDS BY RX/DR IN RCRD: CPT | Performed by: INTERNAL MEDICINE

## 2024-07-08 PROCEDURE — 3074F SYST BP LT 130 MM HG: CPT | Performed by: INTERNAL MEDICINE

## 2024-07-08 RX ORDER — VALACYCLOVIR HYDROCHLORIDE 500 MG/1
500 TABLET, FILM COATED ORAL AS NEEDED
Status: SHIPPED | COMMUNITY
Start: 2024-07-08

## 2024-07-08 NOTE — PATIENT INSTRUCTIONS
Thank you much for coming.  I am very happy to see you.    It will be very helpful to have FASTING laboratory examinations to be done here in about 6 weeks, then see me a few days later, so that we can improve your overall quality of life!    In the meantime, the best person for you to follow, aside from your PHYSICAL THERAPIST, will be Dr. Stringer, ORTHOPEDICS, because your symptoms are all related to your NECK.    Your laboratory examinations will also determine whether or not there are more things to consider.    In the meantime, TYLENOL EXTRA STRENGTH is very safe for you to take.  500 mg tablets, take 2 tablets by mouth every 6-8 hours as needed for pain, up to 6 tablets in 24 hours.    Also, you can get yourself some BIOFREEZE.  This comes in a gel or ointment form.  Apply to your sore muscles, especially along your back and neck.    Again, thank you very much for coming.  Please do not hesitate to call with questions or concerns.  Please continue to take care of yourself and your family, and please continue to pray for our recovery from this pandemic.    When you come back in 6 weeks, we will review your fasting laboratory results, and we will also review your options regarding weight management and again, improvement of your overall quality of life!  Until then, please do not hesitate to call or text.  Take care and God bless.            0  Return in 6 weeks.  20 minutes please.  FASTING laboratory examinations to be done here a few days prior, then see me for reevaluation of debility.  Coordinate with orthopedics, physical therapy, urology, specialists as patient is seen.  Reassess mood, energy, function, preventive strategies, weight management, cardiovascular risk.            0

## 2024-07-08 NOTE — PROGRESS NOTES
"Subjective   Patient ID: Elvira Dunne is a 79 y.o. female who presents for Follow-up (Patient presented today for a 4 month follow up./).    HPI   Interval history noted.  In the meantime, states that when she takes medicines for pain, including muscle relaxant, ibuprofen, Tylenol, especially after taking medication for a while, she states that she starts having RESTLESS LEGS, especially when she is about to go to bed.  Again, only when she is taking medications for 2 or 3 days.  This even progressed to affecting her upper extremities, she states.    She could not take her symptoms, so she finally presented to the ER, where she was referred to ORTHOPEDICS, Dr. Stringer, as well as PHYSICAL THERAPY.  She states that stopping her medications for pain and or muscle spasm actually improved her symptoms, and PHYSICAL THERAPY has been very helpful.  Currently, no headache, blurred vision, diplopia.  No dysphagia.  Continues to want to improve quality of life, and does not wish harm to self or others.  No falls.    Otherwise, no rodney substernal chest pain.  No particular cough, no particular sputum production.  Appetite preserved, no abdominal distress.  No dysuria, flank, suprapubic pain.  No skin changes.  CONSTITUTIONALLY, no fever, no chills.  No night sweats.  No lingering anorexia or nausea.  No apparent lymphadenopathy.  No apparent weight loss.        Review of Systems  Review of systems as in history of present illness, and otherwise, reviewed separately as well, and was unremarkable/negative/noncontributory.        Objective   /70 (BP Location: Left arm, Patient Position: Sitting, BP Cuff Size: Adult)   Pulse 74   Ht 1.6 m (5' 3\")   Wt 85.7 kg (189 lb)   SpO2 96%   BMI 33.48 kg/m²     Physical Exam  In good spirits.  Not in distress or diaphoresis.  Receptive, oriented x 3.  Amiable.  Not unkempt.  Continues to want to improve quality of life, and does not wish harm to self or others.    HEAD pink " palpebral conjunctivae, anicteric sclerae.  NECK supple, no apparent jugular venous distention.  CARDIOVASCULAR not in distress or diaphoresis.  No bipedal edema.  LUNGS not in distress or diaphoresis.  Not using accessory muscles.  ABDOMEN soft, nontender.  BACK no costovertebral angle tenderness.  EXTREMITIES no clubbing, no cyanosis.  NEURO no facial asymmetry.  No apparent cranial nerve deficits.  Romberg negative.  Ambulating without need of assistance.  No apparent focal weakness.  No tremors.  PSYCH receptive, appropriate, and eager to maintain and improve quality of life.        LABORATORY results reviewed, to be updated soon.        Assessment/Plan   Diagnoses and all orders for this visit:  Cervical radiculopathy  -     Comprehensive Metabolic Panel; Future  -     TSH with reflex to Free T4 if abnormal; Future  -     Magnesium; Future  -     Creatine Kinase; Future  -     Uric Acid; Future  -     Vitamin B12; Future  -     Folate; Future  -     Hemoglobin A1C; Future  -     Follow Up In Primary Care - Established; Future  Trigeminal neuralgia  -     Comprehensive Metabolic Panel; Future  -     TSH with reflex to Free T4 if abnormal; Future  -     Magnesium; Future  -     Creatine Kinase; Future  -     Uric Acid; Future  -     Vitamin B12; Future  -     Folate; Future  -     Hemoglobin A1C; Future  -     Follow Up In Primary Care - Established; Future  Restless legs syndrome  -     Comprehensive Metabolic Panel; Future  -     TSH with reflex to Free T4 if abnormal; Future  -     Magnesium; Future  -     Creatine Kinase; Future  -     Uric Acid; Future  -     Vitamin B12; Future  -     Folate; Future  -     Hemoglobin A1C; Future  -     Follow Up In Primary Care - Established; Future  Class 1 obesity due to excess calories with serious comorbidity and body mass index (BMI) of 34.0 to 34.9 in adult  -     Comprehensive Metabolic Panel; Future  -     TSH with reflex to Free T4 if abnormal; Future  -     Follow  Up In Primary Care - Established; Future  Essential hypertension  -     Follow Up In Primary Care - Established  -     CBC and Auto Differential; Future  -     Comprehensive Metabolic Panel; Future  -     TSH with reflex to Free T4 if abnormal; Future  -     Magnesium; Future  -     Creatine Kinase; Future  -     Urinalysis with Reflex Culture and Microscopic; Future  -     Follow Up In Primary Care - Established; Future  Borderline hyperglycemia  -     Comprehensive Metabolic Panel; Future  -     Hemoglobin A1C; Future  -     Urinalysis with Reflex Culture and Microscopic; Future  -     Follow Up In Primary Care - Established; Future  Hypercholesterolemia with hypertriglyceridemia  -     Comprehensive Metabolic Panel; Future  -     Lipid Panel; Future  -     Follow Up In Primary Care - Established; Future  Vitamin D deficiency  -     Comprehensive Metabolic Panel; Future  -     Follow Up In Primary Care - Established; Future  [place] cardiac risk >20% in next 10 years  -     Comprehensive Metabolic Panel; Future  -     Lipid Panel; Future  -     Hemoglobin A1C; Future  -     Follow Up In Primary Care - Established; Future  History of herpes simplex type 2 infection  -     CBC and Auto Differential; Future       Thank you much for coming.  I am very happy to see you.    It will be very helpful to have FASTING laboratory examinations to be done here in about 6 weeks, then see me a few days later, so that we can improve your overall quality of life!    In the meantime, the best person for you to follow, aside from your PHYSICAL THERAPIST, will be Dr. Stringer, ORTHOPEDICS, because your symptoms are all related to your NECK.    Your laboratory examinations will also determine whether or not there are more things to consider.    In the meantime, TYLENOL EXTRA STRENGTH is very safe for you to take.  500 mg tablets, take 2 tablets by mouth every 6-8 hours as needed for pain, up to 6 tablets in 24 hours.    Also, you  can get yourself some BIOFREEZE.  This comes in a gel or ointment form.  Apply to your sore muscles, especially along your back and neck.    Again, thank you very much for coming.  Please do not hesitate to call with questions or concerns.  Please continue to take care of yourself and your family, and please continue to pray for our recovery from this pandemic.    When you come back in 6 weeks, we will review your fasting laboratory results, and we will also review your options regarding weight management and again, improvement of your overall quality of life!  Until then, please do not hesitate to call or text.  Take care and God bless.            0  Return in 6 weeks.  20 minutes please.  FASTING laboratory examinations to be done here a few days prior, then see me for reevaluation of debility.  Coordinate with orthopedics, physical therapy, urology, specialists as patient is seen.  Reassess mood, energy, function, preventive strategies, weight management, cardiovascular risk.            0

## 2024-07-09 ENCOUNTER — TREATMENT (OUTPATIENT)
Dept: PHYSICAL THERAPY | Facility: CLINIC | Age: 79
End: 2024-07-09
Payer: MEDICARE

## 2024-07-09 DIAGNOSIS — M48.02 STENOSIS OF CERVICAL SPINE: Primary | ICD-10-CM

## 2024-07-09 DIAGNOSIS — M47.22 CERVICAL RADICULOPATHY DUE TO DEGENERATIVE JOINT DISEASE OF SPINE: ICD-10-CM

## 2024-07-09 PROCEDURE — 97110 THERAPEUTIC EXERCISES: CPT | Mod: GP,CQ

## 2024-07-09 ASSESSMENT — PAIN - FUNCTIONAL ASSESSMENT: PAIN_FUNCTIONAL_ASSESSMENT: 0-10

## 2024-07-09 NOTE — PROGRESS NOTES
"Physical Therapy Treatment    Patient Name: Elvira Dunne  MRN: [id number]  Today's Date: 7/9/2024  Time Calculation  Start Time: 0945  Stop Time: 1028  Time Calculation (min): 43 min    Insurance  MEDICARE/MMOSUP 2230 ($ 0 USED ) COPAY 0 (MET) COVERAGE 80/100 OOP 0 MMOSUP TO FOLLOW MEDICARE NO AUTH REQ [id number]     POC Visit: 2 of 8    Current Problem  1. Stenosis of cervical spine        2. Cervical radiculopathy due to degenerative joint disease of spine            Subjective    General   Pt reports feeling a \"jarring\" through L cervical spine when walking.  Also reports occasional numbness through L side, UT area.  No current c/o sx's.  She reports she has been doing the ex's given at initial visit & they seem to be helping.  She has a little more motion since first visit.  \"I can turn my head now.\"     Precautions  Precautions  Precautions Comment: No recent falls reported    Pain  Pain Assessment  Pain Assessment: 0-10  Pain Location: Neck  Pain Orientation: Left  Pain Descriptors:  (\"jarring\")  Pain Frequency: Intermittent    Objective   Occasional c/o numbness from neck to top of L shoulder    Treatments:  Therapeutic Exercises (45249): 41 Minutes, 3 Units    Activities:  UT Stretch: 30\" x3  Lev.Scap Stretch: 30\" x3  Cervical Extension with Towel: x15  Cervical Rotation with Towel: x10 each  Scapular Retractions: 5\" x20  Chin Tucks (seated): 5\" x10  Chin Tucks (supine): 5\" x10  Doorway Stretch: 15\" x5    Assessment:  Reviewed activities initiated first visit.  Progressed with addition of scapular retractions, chin tucks (in both seated & supine positions), cervical rotatons w/towel & doorway stretch.  Provided pt with verbal/tactile cues for correct technique, posture as needed with fair follow through observed.  She responds well to activities performed & does report less tightness, more mobility through cervical spine post activity.  Anticipate she will do well with rehab POC.  Plan to continue " progressing with activities as tolerated next visit.  Pt provided with updated HEP today.      Plan:   Continue to progress with rehab activities as tolerated to increase cervical mobility & reduce sx's.  Trial mechanical traction, cervical mobs; postural education for symptom management, RTC strengthening        OP EDUCATION:   Access Code: [email or web address]  URL: [email or web address]  Date: 07/09/2024  Prepared by: Lore Preston    Exercises  - Seated Scapular Retraction  - 1 x daily - 7 x weekly - 3 sets - 10 reps - 5 hold  - Seated Cervical Retraction  - 1 x daily - 7 x weekly - 3 sets - 10 reps - 5 hold  - Supine Cervical Retraction with Towel  - 1 x daily - 7 x weekly - 3 sets - 10 reps - 5 hold  - Doorway Pec Stretch at 90 Degrees Abduction  - 1 x daily - 7 x weekly - 3 sets - 30 hold

## 2024-07-12 ENCOUNTER — TREATMENT (OUTPATIENT)
Dept: PHYSICAL THERAPY | Facility: CLINIC | Age: 79
End: 2024-07-12
Payer: MEDICARE

## 2024-07-12 DIAGNOSIS — M47.22 CERVICAL RADICULOPATHY DUE TO DEGENERATIVE JOINT DISEASE OF SPINE: ICD-10-CM

## 2024-07-12 DIAGNOSIS — M48.02 STENOSIS OF CERVICAL SPINE: Primary | ICD-10-CM

## 2024-07-12 PROCEDURE — 97110 THERAPEUTIC EXERCISES: CPT | Mod: GP

## 2024-07-12 PROCEDURE — 97140 MANUAL THERAPY 1/> REGIONS: CPT | Mod: GP

## 2024-07-12 NOTE — PROGRESS NOTES
"Patient Name: Elvira Dunne  MRN: [id number]  Time Calculation  Start Time: 1005  Stop Time: 1044  Time Calculation (min): 39 min     PT Therapeutic Procedures Time Entry  Manual Therapy Time Entry: 10  Therapeutic Exercise Time Entry: 29                     Current Problem  1. Stenosis of cervical spine        2. Cervical radiculopathy due to degenerative joint disease of spine            Insurance  MEDICARE/MMOSUP 2230 ($ 0 USED ) COPAY 0 (MET) COVERAGE 80/100 OOP 0 MMOSUP TO FOLLOW MEDICARE NO AUTH REQ [id number]/ALL      POC Visit: 3 of 8     Subjective     General  Pt had some trouble with with her new exercises at home. Still overall feels her cervical motion has improved, only a bit sore today  Precautions    Pain  1-2/10    Objective     Treatments:    LS Stretch: 2x30\" x wili  UT Stretch: 30\" x2  Cervical Extension with Towel: x15  Cervical Rotation with Towel: x10 each  Chin Tucks (supine): x10    Manual:  STM to UT, LS, suboccipital release with accupressure- 10'   OP EDUCATION/HEP:  Significant time throughout session proving cues education on proper performance of exercises here and at home. With repetition demonstrated fair carryover, most noted difficulty sequencing rotation SNAGS and scap retractions    Access Code: [email or web address]  URL: [email or web address]  Date: 07/12/2024  Prepared by: Wilber Torres    Exercises  - Seated Cervical Rotation AROM  - 2 x daily - 7 x weekly - 1 sets - 10 reps  - Seated Neck Sidebending ROM  - 2 x daily - 7 x weekly - 1 sets - 10 reps  Assessment   Session mainly focused on review of HEP, continued practice as pt continues to endorse difficulty performing them properly at home. Pt require increased cues (verbal and tactile) to sequence exercises correctly but demo's fair carryover with practice. Added gentle cervical AROM to pt's HEP. Pt still demonstrating decreased cervical ROM with pain/numbness to the left side. Recommend continued skilled PT to " address these deficits.     Plan     Continue per POC. Review HEP as needed, continue with chin tucks/manual;   Working to progress to some shoulder strengthening once pt is able to progress at her neck.

## 2024-07-16 ENCOUNTER — TREATMENT (OUTPATIENT)
Dept: PHYSICAL THERAPY | Facility: CLINIC | Age: 79
End: 2024-07-16
Payer: MEDICARE

## 2024-07-16 DIAGNOSIS — M48.02 STENOSIS OF CERVICAL SPINE: Primary | ICD-10-CM

## 2024-07-16 DIAGNOSIS — M47.22 CERVICAL RADICULOPATHY DUE TO DEGENERATIVE JOINT DISEASE OF SPINE: ICD-10-CM

## 2024-07-16 PROCEDURE — 97140 MANUAL THERAPY 1/> REGIONS: CPT | Mod: GP,CQ

## 2024-07-16 PROCEDURE — 97110 THERAPEUTIC EXERCISES: CPT | Mod: GP,CQ

## 2024-07-16 NOTE — PROGRESS NOTES
"Physical Therapy Treatment    Patient Name: Elvira Dunne  MRN: [id number]  Today's Date: 7/16/2024  Time Calculation  Start Time: 0948  Stop Time: 1035  Time Calculation (min): 47 min    Insurance  MEDICARE/MMOSUP 2230 ($ 0 USED ) COPAY 0 (MET) COVERAGE 80/100 OOP 0 MMOSUP TO FOLLOW MEDICARE NO AUTH REQ [id number]/ALL      POC Visit: 4 of 8      Current Problem  1. Stenosis of cervical spine        2. Cervical radiculopathy due to degenerative joint disease of spine            Subjective    General   Pt continues to report tightness, discomfort through B shoulders, cervical spine.  She does, however, state the motion \"may be a little better.\"  She is attempting HEP, but also still states she is not sure she does them correctly.     Precautions  Precautions  Precautions Comment: No recent falls reported    Pain  Pain Assessment  Pain Location: Neck  Pain Orientation: Left, Right  Pain Frequency: Intermittent    Objective   No current c/o numbness, tingling    Treatments:  Therapeutic Exercises (72495): 32 Minutes, 2 Units  Manual Therapy (43681): 10 Minutes, 1 Units    Activities:  LS Stretch: 3x30\" KAI  UT Stretch: 3x30\" KAI  Cervical Extension with Towel: x15  Cervical Rotation with Towel: x10 each  Chin Tucks (supine): 5\" x10  Doorway Stretch: 30\" x3   Scapular Retractions: --> Add NV    Manual:  STM to UT, LS, suboccipital release with accupressure- 10'     Assessment:   Pt continues to require much instruction (verbal, tactile cues) for correct technique, posture during activities.  Fair follow through to instruction provided.  This limits ability to progress with activities as so much of session is spent instructing/correcting current activities.  She does exhibit mild improvement overall in cervical mobility with activities performed.  Also demonstrates improved ability to complete supine chin tuck this visit.  Will plan to review & continue with progression of activities next visit, as able.    Plan:    " Continue to progress with rehab activities as tolerated to increase cervical mobility & reduce sx's.  Attempt beginning strengthening activities NV as tolerated.

## 2024-07-23 ENCOUNTER — HOSPITAL ENCOUNTER (OUTPATIENT)
Dept: RADIOLOGY | Facility: CLINIC | Age: 79
Discharge: HOME | End: 2024-07-23
Payer: MEDICARE

## 2024-07-23 ENCOUNTER — OFFICE VISIT (OUTPATIENT)
Dept: ORTHOPEDIC SURGERY | Facility: CLINIC | Age: 79
End: 2024-07-23
Payer: MEDICARE

## 2024-07-23 ENCOUNTER — TREATMENT (OUTPATIENT)
Dept: PHYSICAL THERAPY | Facility: CLINIC | Age: 79
End: 2024-07-23
Payer: MEDICARE

## 2024-07-23 DIAGNOSIS — M54.2 NECK PAIN: ICD-10-CM

## 2024-07-23 DIAGNOSIS — M48.02 STENOSIS OF CERVICAL SPINE: Primary | ICD-10-CM

## 2024-07-23 DIAGNOSIS — M54.12 CERVICAL RADICULOPATHY: Primary | ICD-10-CM

## 2024-07-23 DIAGNOSIS — M47.22 CERVICAL RADICULOPATHY DUE TO DEGENERATIVE JOINT DISEASE OF SPINE: ICD-10-CM

## 2024-07-23 PROCEDURE — 72050 X-RAY EXAM NECK SPINE 4/5VWS: CPT

## 2024-07-23 PROCEDURE — 97110 THERAPEUTIC EXERCISES: CPT | Mod: GP,CQ

## 2024-07-23 PROCEDURE — 97140 MANUAL THERAPY 1/> REGIONS: CPT | Mod: GP,CQ

## 2024-07-23 PROCEDURE — 72050 X-RAY EXAM NECK SPINE 4/5VWS: CPT | Performed by: ORTHOPAEDIC SURGERY

## 2024-07-23 PROCEDURE — 99214 OFFICE O/P EST MOD 30 MIN: CPT | Performed by: ORTHOPAEDIC SURGERY

## 2024-07-23 PROCEDURE — 99213 OFFICE O/P EST LOW 20 MIN: CPT | Performed by: ORTHOPAEDIC SURGERY

## 2024-07-23 PROCEDURE — 1157F ADVNC CARE PLAN IN RCRD: CPT | Performed by: ORTHOPAEDIC SURGERY

## 2024-07-23 NOTE — PROGRESS NOTES
Elvira Dunne is a 79 y.o. female who presents for Pain of the Neck (Had Mri , xrays today).    HPI:  79-year-old female here for new patient visit of neck pain.  Has MRI.  She denies any fever chills nausea vomiting night sweats.  She has no bowel or bladder complaints.    Physical exam:  Well-nourished, well kept.  No lymphangitis or lymphadenopathy in the examined extremities. Affect normal.  Alert and oriented X 3.  Coordination normal.  Patient can rise from a seated position, can sit from a standing position. Can stand on heels and toes.  Patient is mildly tender in the paraspinal musculature of the cervical spine. range of motion is mildly decreased secondary to some pain and stiffness no weakness no instability to muscle strength. examination of the upper extremities reveals no point tenderness, swelling, or deformity.  Range of motion of the shoulders, elbows, wrists, and fingers are full without crepitance, instability, or exacerbation of pain. Strength is 5/5 throughout. no redness, abrasions, or lesions on the upper extremities bilaterally.  Gross sensation intact to the extremities.  Deep tendon reflexes 1+ and symmetric bilaterally.  Olivier negative.     Imaging studies:  We reviewed an MRI from July 5, 2024 today.    Assessment:  79-year-old female here for new patient visit of neck pain and mostly left shoulder and left upper arm pain.  She has an MRI in the system, we obtained x-rays today.  This has been going on for about a month or so.  She has seen Dr. Gloria and Dr. Wheeler for this problem.  It is about 50-50 neck versus left trapezius parascapular and shoulder and upper arm pain.  She is not getting anything down in the left forearm, nothing out of the left hand.  She does have a history of a left carpal tunnel surgery.  She has had bilateral rotator cuff tears in the past and she thinks she may have aggravated her left side again with lifting.  She has been doing physical therapy, and she  feels at least 50% better with the therapy.  She is doing her home exercises.  No history of injections or surgery on her neck.    We have ordered and reviewed tests, x-rays and MRI.  Her  is with her today acting as a helpful and necessary historian.  This is an undiagnosed new problem with uncertain prognosis.    For complete plan and/or surgical details, please refer to Dr. Stringer's portion of this split dictation.    -Zander Bettencourt PA-C    In a face-to-face encounter, I performed a history and physical examination, discussed pertinent diagnostic studies if indicated, and discussed diagnosis and management strategies with both the patient and the midlevel provider.  I reviewed the midlevel's note and agree with the documented findings and plan of care.    Patient with neck pain shoulder pain that goes down her arm and stops at the elbow.  There is probably a radicular component.  She does have some stenosis in her cervical spine at C6-7 which is mild to moderate.  Other than that I do not see any significant stenosis.  There does not appear to be much of a shoulder issue going on as when I range her shoulder I cannot reproduce much of her symptoms.  She is been doing physical therapy and she is moderately improved and she can live with her symptoms.  This is been going on for a month.  This patient has a new acute problem of uncertain prognosis.  We have ordered and reviewed x-rays today.  We have also reviewed the results of a MRI.  I had a long discussion with the patient and explained to them that their options are 1) live with the symptoms and see how they evolve, 2) physical therapy, 3) pain management or 4) surgery.  At this point the patient is can to continue with physical therapy and follow-up with us on a as needed basis.    Joseph Stringer MD  Orthopedic surgery

## 2024-07-23 NOTE — PROGRESS NOTES
"Physical Therapy Treatment    Patient Name: Elvira Dunne  MRN: [id number]  Today's Date: 7/23/2024  Time Calculation  Start Time: 1103  Stop Time: 1143  Time Calculation (min): 40 min     PT Therapeutic Procedures Time Entry  Manual Therapy Time Entry: 10  Therapeutic Exercise Time Entry: 30                 Insurance:   MEDICARE/MMOSUP 2230 ($ 0 USED ) COPAY 0 (MET) COVERAGE 80/100 OOP 0 MMOSUP TO FOLLOW MEDICARE NO AUTH REQ [id number]/ALL      POC Visit: 5 of 8  Assessment:   Per last PT note add strengthening ex's as vicenta. Progressed her program today w/ addition of Seated TB ER and HABD. Poor posture and significant UT elevation w/ ex dt shoulder weakness. Pt needed mod/max verbal and tactile cues to decrease UT elevation and increase scap setting. Pt fatigues rather quickly w/ ex and presents w/ increased compensation as she fatigues. Poor posture throughout session w/ mod cues needed to correct. Emphasized importance of focusing on posture at home and w/ HEP as to not cont to put stress on cervical and thoracic spine. Mod tightness and tenderness c/o w/ STM/TPR to Uts, Cervical p/s, levator and rhomboids. With completion of neck stretches pt reports significant tightness and c/o \"pulling\" along neck/upper shoulders.   She will cont to benefit from skilled PT to address her deficits and improve her overall functional ability.   Plan:     Continue to progress with rehab activities as tolerated to increase cervical mobility & reduce sx's.    Current Problem  1. Stenosis of cervical spine  Follow Up In Physical Therapy      2. Cervical radiculopathy due to degenerative joint disease of spine  Follow Up In Physical Therapy          Subjective   General   \"I have more movement in my neck but I still feel a pull in my neck when I move it certain ways.\" Pt saw Siomara prior to today's PT appt and she is cleared from seeing him unless she has new issues.   Precautions       Pain       Objective " "      Treatments:  Therapeutic Exercise (45013):   LS Stretch: 2x30\" KAI  UT Stretch: 2x30\" KAI  Cervical Extension with Towel: x15  Cervical Rotation with Towel: x10 each  Chin Tucks (supine): 5\" x10 -NT   Doorway Stretch: 30\" x3   Scapular Retractions: --> Add NV  TB ER Habd/ER YTB x10 ea     Manual (40573): STM to UT, LS, suboccipital release with accupressure- 10'          EDUCATION:         "

## 2024-07-30 ENCOUNTER — TREATMENT (OUTPATIENT)
Dept: PHYSICAL THERAPY | Facility: CLINIC | Age: 79
End: 2024-07-30
Payer: MEDICARE

## 2024-07-30 DIAGNOSIS — M48.02 STENOSIS OF CERVICAL SPINE: Primary | ICD-10-CM

## 2024-07-30 DIAGNOSIS — M47.22 CERVICAL RADICULOPATHY DUE TO DEGENERATIVE JOINT DISEASE OF SPINE: ICD-10-CM

## 2024-07-30 PROCEDURE — 97110 THERAPEUTIC EXERCISES: CPT | Mod: GP

## 2024-07-30 NOTE — PROGRESS NOTES
"Patient Name: Elvira Dunne  MRN: [id number]  Time Calculation  Start Time: 1045  Stop Time: 1123  Time Calculation (min): 38 min     PT Therapeutic Procedures Time Entry  Therapeutic Exercise Time Entry: 38                     Current Problem  1. Stenosis of cervical spine        2. Cervical radiculopathy due to degenerative joint disease of spine            Insurance  MEDICARE/MMOSUP 2230 ($ 0 USED ) COPAY 0 (MET) COVERAGE 80/100 OOP 0 MMOSUP TO FOLLOW MEDICARE NO AUTH REQ [id number]/ALL      POC Visit: 6 of 8  Subjective     General  Pt reports she is feeling better today, overall feels like her neck motion is better. Feels more confident overall with her HEP.     Precautions    Pain  0/10    Objective     Treatments:    UBE:  2'/2'  SNAGS:  ext, kai rotation;  x10 each   LS Stretch: 2x30\" KAI  UT Stretch: 2x30\" KAI  Seated upright chin tuck: x10   Chin tuck+shoulder scaption raise:  2x10 (1#->2#)   Chin tuck+ RTB horizontal abd:  2x10   RTB row+ chin tuck (Row+ LAE)  :  2x10 each     NT:   Chin Tucks (supine): 5\" x10    Doorway Stretch: 30\" x3   Scapular Retractions:   TB ER Habd/ER YTB x10 ea     Manual (50589): STM to UT, LS, suboccipital release with accupressure- 10'      OP EDUCATION/HEP:  Pt still xiao's difficulty with proper technique of exercises requiring frequent VC     Access Code: [email or web address]  URL: [email or web address]  Date: 07/30/2024  Prepared by: Wilber Torres    Exercises  - Standing Shoulder Horizontal Abduction with Resistance  - 1 x daily - 4 x weekly - 3 sets - 8 reps      Assessment   Pt still demonstrating difficulty with sequencing most exercises during session, but overall has improved during therapy course. Still demo's FHP with most activities, focus of today's session was to promote increased upper cervical flexion/lower cervical extension during UE strengthening activities. Pt endorses gradually improving symptoms, no pain for this session. Added UE TB " horizontal abduction to improve periscapular strength and promote improved posture in standing/sitting. Will assess readiness for discharge at [facility], recommend continued skilled PT to help pt reach her goals.     Plan     Continue per POC. RPT next visit

## 2024-08-09 ENCOUNTER — APPOINTMENT (OUTPATIENT)
Dept: PHYSICAL THERAPY | Facility: CLINIC | Age: 79
End: 2024-08-09
Payer: MEDICARE

## 2024-08-13 ENCOUNTER — LAB (OUTPATIENT)
Dept: LAB | Facility: LAB | Age: 79
End: 2024-08-13
Payer: MEDICARE

## 2024-08-13 DIAGNOSIS — N39.0 URINARY TRACT INFECTION, SITE NOT SPECIFIED: ICD-10-CM

## 2024-08-13 DIAGNOSIS — R10.9 UNSPECIFIED ABDOMINAL PAIN: Primary | ICD-10-CM

## 2024-08-13 DIAGNOSIS — N20.2 CALCULUS OF KIDNEY AND URETER: ICD-10-CM

## 2024-08-13 DIAGNOSIS — N28.1 CYST OF KIDNEY, ACQUIRED: ICD-10-CM

## 2024-08-13 DIAGNOSIS — R35.0 FREQUENCY OF MICTURITION: ICD-10-CM

## 2024-08-13 LAB
APPEARANCE UR: CLEAR
BACTERIA #/AREA URNS AUTO: ABNORMAL /HPF
BILIRUB UR STRIP.AUTO-MCNC: NEGATIVE MG/DL
COLOR UR: ABNORMAL
GLUCOSE UR STRIP.AUTO-MCNC: NORMAL MG/DL
KETONES UR STRIP.AUTO-MCNC: NEGATIVE MG/DL
LEUKOCYTE ESTERASE UR QL STRIP.AUTO: ABNORMAL
MUCOUS THREADS #/AREA URNS AUTO: ABNORMAL /LPF
NITRITE UR QL STRIP.AUTO: NEGATIVE
PH UR STRIP.AUTO: 6 [PH]
PROT UR STRIP.AUTO-MCNC: NEGATIVE MG/DL
RBC # UR STRIP.AUTO: NEGATIVE /UL
RBC #/AREA URNS AUTO: ABNORMAL /HPF
SP GR UR STRIP.AUTO: 1.02
URATE CRY #/AREA UR COMP ASSIST: ABNORMAL /HPF
UROBILINOGEN UR STRIP.AUTO-MCNC: NORMAL MG/DL
WBC #/AREA URNS AUTO: ABNORMAL /HPF

## 2024-08-13 PROCEDURE — 81001 URINALYSIS AUTO W/SCOPE: CPT

## 2024-08-13 PROCEDURE — 87186 SC STD MICRODIL/AGAR DIL: CPT

## 2024-08-13 PROCEDURE — 87086 URINE CULTURE/COLONY COUNT: CPT

## 2024-08-16 LAB — BACTERIA UR CULT: ABNORMAL

## 2024-08-19 ENCOUNTER — APPOINTMENT (OUTPATIENT)
Dept: PRIMARY CARE | Facility: CLINIC | Age: 79
End: 2024-08-19
Payer: MEDICARE

## 2024-08-19 DIAGNOSIS — R73.9 BORDERLINE HYPERGLYCEMIA: ICD-10-CM

## 2024-08-19 DIAGNOSIS — N39.0 ACUTE UTI: ICD-10-CM

## 2024-08-19 DIAGNOSIS — E55.9 VITAMIN D DEFICIENCY: ICD-10-CM

## 2024-08-19 DIAGNOSIS — Z91.89 FRAMINGHAM CARDIAC RISK >20% IN NEXT 10 YEARS: ICD-10-CM

## 2024-08-19 DIAGNOSIS — Z13.9 SCREENING FOR CONDITION: ICD-10-CM

## 2024-08-19 DIAGNOSIS — E78.2 HYPERCHOLESTEROLEMIA WITH HYPERTRIGLYCERIDEMIA: ICD-10-CM

## 2024-08-19 DIAGNOSIS — E66.09 CLASS 1 OBESITY DUE TO EXCESS CALORIES WITH SERIOUS COMORBIDITY AND BODY MASS INDEX (BMI) OF 34.0 TO 34.9 IN ADULT: ICD-10-CM

## 2024-08-19 DIAGNOSIS — G25.81 RESTLESS LEGS SYNDROME: ICD-10-CM

## 2024-08-19 DIAGNOSIS — I10 ESSENTIAL HYPERTENSION: ICD-10-CM

## 2024-08-19 DIAGNOSIS — M54.12 CERVICAL RADICULOPATHY: ICD-10-CM

## 2024-08-19 DIAGNOSIS — Z86.19 HISTORY OF HERPES SIMPLEX TYPE 2 INFECTION: ICD-10-CM

## 2024-08-19 DIAGNOSIS — G50.0 TRIGEMINAL NEURALGIA: ICD-10-CM

## 2024-08-19 LAB
ALBUMIN SERPL BCP-MCNC: 4.4 G/DL (ref 3.4–5)
ALP SERPL-CCNC: 112 U/L (ref 33–136)
ALT SERPL W P-5'-P-CCNC: 15 U/L (ref 7–45)
ANION GAP SERPL CALC-SCNC: 16 MMOL/L (ref 10–20)
APPEARANCE UR: CLEAR
AST SERPL W P-5'-P-CCNC: 17 U/L (ref 9–39)
BACTERIA #/AREA URNS AUTO: ABNORMAL /HPF
BASOPHILS # BLD AUTO: 0.01 X10*3/UL (ref 0–0.1)
BASOPHILS NFR BLD AUTO: 0.3 %
BILIRUB SERPL-MCNC: 0.6 MG/DL (ref 0–1.2)
BILIRUB UR STRIP.AUTO-MCNC: NEGATIVE MG/DL
BUN SERPL-MCNC: 15 MG/DL (ref 6–23)
CALCIUM SERPL-MCNC: 9.5 MG/DL (ref 8.6–10.3)
CHLORIDE SERPL-SCNC: 104 MMOL/L (ref 98–107)
CHOLEST SERPL-MCNC: 204 MG/DL (ref 0–199)
CHOLESTEROL/HDL RATIO: 3.5
CK SERPL-CCNC: 72 U/L (ref 0–215)
CO2 SERPL-SCNC: 24 MMOL/L (ref 21–32)
COLOR UR: ABNORMAL
CREAT SERPL-MCNC: 0.73 MG/DL (ref 0.5–1.05)
EGFRCR SERPLBLD CKD-EPI 2021: 84 ML/MIN/1.73M*2
EOSINOPHIL # BLD AUTO: 0.08 X10*3/UL (ref 0–0.4)
EOSINOPHIL NFR BLD AUTO: 2.3 %
ERYTHROCYTE [DISTWIDTH] IN BLOOD BY AUTOMATED COUNT: 12.6 % (ref 11.5–14.5)
FOLATE SERPL-MCNC: >22.3 NG/ML
GLUCOSE SERPL-MCNC: 101 MG/DL (ref 74–99)
GLUCOSE UR STRIP.AUTO-MCNC: NORMAL MG/DL
HCT VFR BLD AUTO: 40.4 % (ref 36–46)
HDLC SERPL-MCNC: 59.1 MG/DL
HGB BLD-MCNC: 13.9 G/DL (ref 12–16)
HOLD SPECIMEN: NORMAL
IMM GRANULOCYTES # BLD AUTO: 0.01 X10*3/UL (ref 0–0.5)
IMM GRANULOCYTES NFR BLD AUTO: 0.3 % (ref 0–0.9)
KETONES UR STRIP.AUTO-MCNC: NEGATIVE MG/DL
LDLC SERPL CALC-MCNC: 119 MG/DL
LEUKOCYTE ESTERASE UR QL STRIP.AUTO: ABNORMAL
LYMPHOCYTES # BLD AUTO: 1.18 X10*3/UL (ref 0.8–3)
LYMPHOCYTES NFR BLD AUTO: 34 %
MAGNESIUM SERPL-MCNC: 2.06 MG/DL (ref 1.6–2.4)
MCH RBC QN AUTO: 31.1 PG (ref 26–34)
MCHC RBC AUTO-ENTMCNC: 34.4 G/DL (ref 32–36)
MCV RBC AUTO: 90 FL (ref 80–100)
MONOCYTES # BLD AUTO: 0.47 X10*3/UL (ref 0.05–0.8)
MONOCYTES NFR BLD AUTO: 13.5 %
MUCOUS THREADS #/AREA URNS AUTO: ABNORMAL /LPF
NEUTROPHILS # BLD AUTO: 1.72 X10*3/UL (ref 1.6–5.5)
NEUTROPHILS NFR BLD AUTO: 49.6 %
NITRITE UR QL STRIP.AUTO: NEGATIVE
NON HDL CHOLESTEROL: 145 MG/DL (ref 0–149)
NRBC BLD-RTO: 0 /100 WBCS (ref 0–0)
PH UR STRIP.AUTO: 5.5 [PH]
PLATELET # BLD AUTO: 239 X10*3/UL (ref 150–450)
POTASSIUM SERPL-SCNC: 4.1 MMOL/L (ref 3.5–5.3)
PROT SERPL-MCNC: 7 G/DL (ref 6.4–8.2)
PROT UR STRIP.AUTO-MCNC: NEGATIVE MG/DL
RBC # BLD AUTO: 4.47 X10*6/UL (ref 4–5.2)
RBC # UR STRIP.AUTO: NEGATIVE /UL
RBC #/AREA URNS AUTO: ABNORMAL /HPF
SODIUM SERPL-SCNC: 140 MMOL/L (ref 136–145)
SP GR UR STRIP.AUTO: 1.01
TRIGL SERPL-MCNC: 130 MG/DL (ref 0–149)
TSH SERPL-ACNC: 1.87 MIU/L (ref 0.44–3.98)
URATE SERPL-MCNC: 4.4 MG/DL (ref 2.3–6.7)
UROBILINOGEN UR STRIP.AUTO-MCNC: NORMAL MG/DL
VIT B12 SERPL-MCNC: 1798 PG/ML (ref 211–911)
VLDL: 26 MG/DL (ref 0–40)
WBC # BLD AUTO: 3.5 X10*3/UL (ref 4.4–11.3)
WBC #/AREA URNS AUTO: ABNORMAL /HPF
WBC CLUMPS #/AREA URNS AUTO: ABNORMAL /HPF

## 2024-08-19 PROCEDURE — 80061 LIPID PANEL: CPT

## 2024-08-19 PROCEDURE — 84443 ASSAY THYROID STIM HORMONE: CPT

## 2024-08-19 PROCEDURE — 82550 ASSAY OF CK (CPK): CPT

## 2024-08-19 PROCEDURE — 87086 URINE CULTURE/COLONY COUNT: CPT

## 2024-08-19 PROCEDURE — 83735 ASSAY OF MAGNESIUM: CPT

## 2024-08-19 PROCEDURE — 81001 URINALYSIS AUTO W/SCOPE: CPT

## 2024-08-19 PROCEDURE — 85025 COMPLETE CBC W/AUTO DIFF WBC: CPT

## 2024-08-19 PROCEDURE — 84550 ASSAY OF BLOOD/URIC ACID: CPT

## 2024-08-19 PROCEDURE — 82607 VITAMIN B-12: CPT

## 2024-08-19 PROCEDURE — 83036 HEMOGLOBIN GLYCOSYLATED A1C: CPT

## 2024-08-19 PROCEDURE — 80053 COMPREHEN METABOLIC PANEL: CPT

## 2024-08-19 PROCEDURE — 82746 ASSAY OF FOLIC ACID SERUM: CPT

## 2024-08-19 NOTE — PROGRESS NOTES
Subjective   Patient ID: Elvira Dunne is a 79 y.o. female who presents for Labs Only (Pt in today for lab draw).    HPI     Review of Systems    Objective   There were no vitals taken for this visit.    Physical Exam    Assessment/Plan

## 2024-08-20 LAB
EST. AVERAGE GLUCOSE BLD GHB EST-MCNC: 120 MG/DL
HBA1C MFR BLD: 5.8 %

## 2024-08-21 LAB — BACTERIA UR CULT: ABNORMAL

## 2024-08-27 ENCOUNTER — TREATMENT (OUTPATIENT)
Dept: PHYSICAL THERAPY | Facility: CLINIC | Age: 79
End: 2024-08-27
Payer: MEDICARE

## 2024-08-27 ENCOUNTER — APPOINTMENT (OUTPATIENT)
Dept: PRIMARY CARE | Facility: CLINIC | Age: 79
End: 2024-08-27
Payer: MEDICARE

## 2024-08-27 VITALS
HEART RATE: 71 BPM | DIASTOLIC BLOOD PRESSURE: 84 MMHG | WEIGHT: 190.9 LBS | HEIGHT: 63 IN | OXYGEN SATURATION: 96 % | BODY MASS INDEX: 33.82 KG/M2 | SYSTOLIC BLOOD PRESSURE: 130 MMHG

## 2024-08-27 DIAGNOSIS — M47.22 CERVICAL RADICULOPATHY DUE TO DEGENERATIVE JOINT DISEASE OF SPINE: ICD-10-CM

## 2024-08-27 DIAGNOSIS — E78.2 HYPERCHOLESTEROLEMIA WITH HYPERTRIGLYCERIDEMIA: ICD-10-CM

## 2024-08-27 DIAGNOSIS — E66.09 CLASS 1 OBESITY DUE TO EXCESS CALORIES WITH SERIOUS COMORBIDITY AND BODY MASS INDEX (BMI) OF 34.0 TO 34.9 IN ADULT: ICD-10-CM

## 2024-08-27 DIAGNOSIS — R73.9 BORDERLINE HYPERGLYCEMIA: ICD-10-CM

## 2024-08-27 DIAGNOSIS — G50.0 TRIGEMINAL NEURALGIA: ICD-10-CM

## 2024-08-27 DIAGNOSIS — M48.02 STENOSIS OF CERVICAL SPINE: Primary | ICD-10-CM

## 2024-08-27 DIAGNOSIS — I10 ESSENTIAL HYPERTENSION: ICD-10-CM

## 2024-08-27 DIAGNOSIS — G25.81 RESTLESS LEGS SYNDROME: ICD-10-CM

## 2024-08-27 DIAGNOSIS — E55.9 VITAMIN D DEFICIENCY: ICD-10-CM

## 2024-08-27 DIAGNOSIS — Z91.89 FRAMINGHAM CARDIAC RISK >20% IN NEXT 10 YEARS: Chronic | ICD-10-CM

## 2024-08-27 DIAGNOSIS — M54.12 CERVICAL RADICULOPATHY: ICD-10-CM

## 2024-08-27 PROCEDURE — 97110 THERAPEUTIC EXERCISES: CPT | Mod: GP

## 2024-08-27 PROCEDURE — 1159F MED LIST DOCD IN RCRD: CPT | Performed by: INTERNAL MEDICINE

## 2024-08-27 PROCEDURE — 1160F RVW MEDS BY RX/DR IN RCRD: CPT | Performed by: INTERNAL MEDICINE

## 2024-08-27 PROCEDURE — 3079F DIAST BP 80-89 MM HG: CPT | Performed by: INTERNAL MEDICINE

## 2024-08-27 PROCEDURE — 99213 OFFICE O/P EST LOW 20 MIN: CPT | Performed by: INTERNAL MEDICINE

## 2024-08-27 PROCEDURE — 1157F ADVNC CARE PLAN IN RCRD: CPT | Performed by: INTERNAL MEDICINE

## 2024-08-27 PROCEDURE — 1036F TOBACCO NON-USER: CPT | Performed by: INTERNAL MEDICINE

## 2024-08-27 PROCEDURE — 3075F SYST BP GE 130 - 139MM HG: CPT | Performed by: INTERNAL MEDICINE

## 2024-08-27 RX ORDER — ROSUVASTATIN CALCIUM 5 MG/1
TABLET, COATED ORAL
Qty: 45 TABLET | Refills: 0 | Status: SHIPPED | OUTPATIENT
Start: 2024-08-27

## 2024-08-27 RX ORDER — NITROFURANTOIN 25; 75 MG/1; MG/1
1 CAPSULE ORAL
COMMUNITY
Start: 2024-08-19

## 2024-08-27 RX ORDER — LATANOPROST 50 UG/ML
1 SOLUTION/ DROPS OPHTHALMIC NIGHTLY
COMMUNITY
Start: 2024-07-10

## 2024-08-27 NOTE — PROGRESS NOTES
"Subjective   Patient ID: Elvira Dunne is a 79 y.o. female who presents for Follow-up (Patient presented today for a 6 week follow up.).    HPI   Domestic stress noted, with daughter and grandchildren back at home, some friction with son-in-law.  Patient states that she feels better because of the support and strength of her .  States that she does wake up with some achiness affecting her legs, and that she is worried that she may have had some exacerbation of restless legs, but with things improving, she states that she would rather not take any new medications.  No headache, blurred vision, diplopia.  No dysphagia.  Continues to want to improve quality of life, and does not wish harm to self or others.  Has been tolerating PHYSICAL THERAPY.  No rodney substernal chest pain, no orthopnea, no paroxysmal nocturnal dyspnea.  Likewise, blood pressure has been controlled.    Seen by UROLOGY, noted to have recurrent UTI.  Drinking lots of fluids, with minimal symptoms, already feeling better.  Tolerating antibiotics, no diarrhea.  No vulvar pruritus.  No skin changes, rashes, pruritus, jaundice.  No easy bruisability.    Seen by DERMATOLOGY, diagnosed with HERPES SIMPLEX.  No new skin changes.  No particular cough, no particular sputum production.  CONSTITUTIONALLY, no fever, no chills.  No night sweats.  No lingering anorexia or nausea.  No apparent lymphadenopathy.  No apparent weight loss.    No rodney substernal chest pain, no orthopnea, no paroxysmal nocturnal dyspnea.  Interested in controlling overall cardiovascular risk.        Review of Systems  Review of systems as in history of present illness, and otherwise, reviewed separately as well, and was unremarkable/negative/noncontributory.        Objective   /84 (BP Location: Left arm, Patient Position: Sitting, BP Cuff Size: Adult)   Pulse 71   Ht 1.6 m (5' 3\")   Wt 86.6 kg (190 lb 14.4 oz)   SpO2 96%   BMI 33.82 kg/m²     Physical Exam  In " otherwise good spirits.  Not in distress or diaphoresis.  Alert, oriented x 3.  Amiable.  Not unkempt.  Moderately obese build, remaining completely independent and capable.  Eager to improve quality of life.  Not wishing harm to self or others.  Appropriate.    HEAD pink palpebral conjunctivae, anicteric sclerae.  NECK supple, no apparent jugular venous distention.  CARDIOVASCULAR not in distress or diaphoresis.  No bipedal edema.  LUNGS not in distress or diaphoresis.  Not using accessory muscles.  ABDOMEN soft, nontender.  BACK no costovertebral angle tenderness.  EXTREMITIES no clubbing, no cyanosis.  NEURO no facial asymmetry.  No apparent cranial nerve deficits.  Romberg negative.  Ambulating without need of assistance.  No apparent focal weakness.  No tremors.  PSYCH receptive, appropriate, and eager to maintain and improve quality of life.        LABORATORY results reviewed, urine culture noted.  Kidney and liver function preserved.  Hemoglobin A1c 5.8, body mass index 33.82.  Mild leukopenia noted.  No anemia.  Hypercholesterolemia, hypertriglyceridemia, ASCVD risk 22.6%.  Vitamin D in April 46.        Assessment/Plan   Diagnoses and all orders for this visit:  Cervical radiculopathy  -     Follow Up In Primary Care - Established; Future  -     Creatine Kinase; Future  Trigeminal neuralgia  -     Follow Up In Primary Care - Established; Future  -     Creatine Kinase; Future  Restless legs syndrome  -     Follow Up In Primary Care - Established; Future  -     Comprehensive Metabolic Panel; Future  -     Creatine Kinase; Future  Class 1 obesity due to excess calories with serious comorbidity and body mass index (BMI) of 34.0 to 34.9 in adult  -     Follow Up In Primary Care - Established; Future  -     Comprehensive Metabolic Panel; Future  Essential hypertension  -     Follow Up In Primary Care - Established; Future  -     Comprehensive Metabolic Panel; Future  -     CBC and Auto Differential; Future  -      Creatine Kinase; Future  Borderline hyperglycemia  -     Follow Up In Primary Care - Established; Future  -     Comprehensive Metabolic Panel; Future  Hypercholesterolemia with hypertriglyceridemia  -     Follow Up In Primary Care - Established  -     Follow Up In Primary Care - Established; Future  -     rosuvastatin (Crestor) 5 mg tablet; Please take ONLY HALF TABLET by mouth at bedtime.  Thank you.  -     Comprehensive Metabolic Panel; Future  -     Lipid Panel; Future  Vitamin D deficiency  -     Follow Up In Primary Care - Established; Future  -     Comprehensive Metabolic Panel; Future  -     Vitamin D 25-Hydroxy,Total (for eval of Vitamin D levels); Future  [place] cardiac risk >20% in next 10 years  Comments:  22.36% 08/2024  Orders:  -     Follow Up In Primary Care - Established; Future  -     rosuvastatin (Crestor) 5 mg tablet; Please take ONLY HALF TABLET by mouth at bedtime.  Thank you.       Thank very much for coming.  I am very happy to see you.    Thank you for doing your FASTING laboratory examinations.  Although you are doing well, your risk for heart attack or stroke deserves attention.  Your blood pressure is very good at the moment.  You are already staying physically active, and you plan to do even more!  Thank you for your efforts.  You also plan to eat more sensibly.  This will all help.    Still, your overall risk for heart attack or stroke is 22.6%.  Any value greater than 20% deserves consideration for STATIN cholesterol medications.    In the meantime, you have been very sensitive to medication, and as such, we have to be very careful!  ROSUVASTATIN, lowest dose 5 mg, take ONLY HALF TABLET by mouth at bedtime.  Watch out for muscle achiness.  Please call me with any questions or concerns.    Please watch out for your RESTLESS LEGS.  If they act up, please let me know.  PRAMIPEXOLE would be the medication of choice for you, which can help with restless legs and will allow you to sleep  through the night.    Please come back in 3 months.  It will be time for your Medicare Wellness evaluation.  Since you are taking new medicines, I have reevaluated your plan.  You will indeed benefit from repeat FASTING laboratory examinations a few days before your next visit.  You can have them done here.    Until then, please continue to take care of yourself and each other, and please continue to pray for our recovery from this pandemic.  I hope you are able to cope with stress.  I am glad to hear that your  is very supportive!  Please call me with any help that I might be able to offer.  Take care and God bless.    Please consider VACCINATIONS, but make sure that they are spaced at least 2 weeks apart.  I would start with the COVID vaccination, and then after 2 weeks, go ahead and do the INFLUENZA vaccination.  Please do not do them both at the same time.  This way, you will have the best response for each vaccination, and you will have less side effects.            0  Return in 3 months.  40 minutes please.  Repeat FASTING laboratory examinations to be done here, then see me for Medicare Wellness evaluation.  Reassess mood, energy, function, prepare for winter.  Review preventive strategies, cardiovascular risk, tolerance to statins, history of restless legs.            0

## 2024-08-27 NOTE — PROGRESS NOTES
Patient Name: Elvira Dunne  MRN: [id number]  Time Calculation  Start Time: 1450  Stop Time: 1516  Time Calculation (min): 26 min     PT Therapeutic Procedures Time Entry  Therapeutic Exercise Time Entry: 26                     Current Problem  1. Stenosis of cervical spine        2. Cervical radiculopathy due to degenerative joint disease of spine            Insurance  MEDICARE/MMOSUP 2230 ($ 0 USED ) COPAY 0 (MET) COVERAGE 80/100 OOP 0 MMOSUP TO FOLLOW MEDICARE NO AUTH REQ [id number]      POC Visit: 6 of 8     Subjective     General  Pt reports her pain is much better these days, is ready to be discharged from therapy. States she has some stressful family related things happening but otherwise has been good physically.     Precautions    Pain  0/10    Objective     Travis ER MMT:  4+/5   Travis IR MMT:  5/5   Travis abd MMT:  4+ /5     Cervical AROM   SB  R- 31  L- 30  Extension:  35 degrees   Flexion:  58 degrees     Treatments:      UBE 2'/2'  TB rows:  x15 hi/lo RTB  TB IR/ER:  x10 RTB travis   Cervical AROM :  x10 each flex/ext and travis SB   Goal assessment/re-check :  10'  OP EDUCATION/HEP:  Discussed progress towards goals and continuation of care. Pt agreeable to be placed on hold for the next 30 days with plan to discharge her if no contact is made in that time.       Assessment   Re-check performed this date. At this time pt has demonstrated improvements in cervical AROM, shoulder ER and abduction, pain levels, and outcome measure score. Some restriction still noted with cervical ROM overall, mainly SB and extension.  Pt denies any significant remaining functional deficits currently, overall has had significant symptom improvement. Pt and this PT in agreement that she is appropriate for discharge after 30 day hold. Encouraged her to reach out with any future questions or concerns.       Goals:    Pt to be IND with HEP (MET)   2. Pt cervical SB AROM to 30 degrees bilaterally for improved ability to tolerate  different positions while sleeping (MET)   3. Pt L shoulder ER MMT to 4+ /5 to demonstrate improved ability to lift overhead (MET)   4. Pt to report maximum pain at less than or equal to 2/10 to demonstrate reduction in symptom magnitude.  (MET)   5. Pt to self report NDI score of less than or equal to 6% to demonstrate statistically significant improvement in function. (MET)     Plan     30 day therapy hold, discharge if no contact in that time

## 2024-08-27 NOTE — PATIENT INSTRUCTIONS
Thank very much for coming.  I am very happy to see you.    Thank you for doing your FASTING laboratory examinations.  Although you are doing well, your risk for heart attack or stroke deserves attention.  Your blood pressure is very good at the moment.  You are already staying physically active, and you plan to do even more!  Thank you for your efforts.  You also plan to eat more sensibly.  This will all help.    Still, your overall risk for heart attack or stroke is 22.6%.  Any value greater than 20% deserves consideration for STATIN cholesterol medications.    In the meantime, you have been very sensitive to medication, and as such, we have to be very careful!  ROSUVASTATIN, lowest dose 5 mg, take ONLY HALF TABLET by mouth at bedtime.  Watch out for muscle achiness.  Please call me with any questions or concerns.    Please watch out for your RESTLESS LEGS.  If they act up, please let me know.  PRAMIPEXOLE would be the medication of choice for you, which can help with restless legs and will allow you to sleep through the night.    Please come back in 3 months.  It will be time for your Medicare Wellness evaluation.  Since you are taking new medicines, I have reevaluated your plan.  You will indeed benefit from repeat FASTING laboratory examinations a few days before your next visit.  You can have them done here.    Until then, please continue to take care of yourself and each other, and please continue to pray for our recovery from this pandemic.  I hope you are able to cope with stress.  I am glad to hear that your  is very supportive!  Please call me with any help that I might be able to offer.  Take care and God bless.    Please consider VACCINATIONS, but make sure that they are spaced at least 2 weeks apart.  I would start with the COVID vaccination, and then after 2 weeks, go ahead and do the INFLUENZA vaccination.  Please do not do them both at the same time.  This way, you will have the best response  for each vaccination, and you will have less side effects.            0  Return in 3 months.  40 minutes please.  Repeat FASTING laboratory examinations to be done here, then see me for Medicare Wellness evaluation.  Reassess mood, energy, function, prepare for winter.  Review preventive strategies, cardiovascular risk, tolerance to statins, history of restless legs.            0

## 2024-10-07 ENCOUNTER — LAB (OUTPATIENT)
Dept: LAB | Facility: LAB | Age: 79
End: 2024-10-07
Payer: MEDICARE

## 2024-10-07 DIAGNOSIS — N28.1 CYST OF KIDNEY, ACQUIRED: ICD-10-CM

## 2024-10-07 DIAGNOSIS — N20.0 CALCULUS OF KIDNEY: ICD-10-CM

## 2024-10-07 DIAGNOSIS — R35.0 FREQUENCY OF MICTURITION: ICD-10-CM

## 2024-10-07 DIAGNOSIS — N31.8 OTHER NEUROMUSCULAR DYSFUNCTION OF BLADDER: ICD-10-CM

## 2024-10-07 DIAGNOSIS — N39.0 URINARY TRACT INFECTION, SITE NOT SPECIFIED: Primary | ICD-10-CM

## 2024-10-07 LAB
APPEARANCE UR: CLEAR
BILIRUB UR STRIP.AUTO-MCNC: NEGATIVE MG/DL
COLOR UR: ABNORMAL
GLUCOSE UR STRIP.AUTO-MCNC: NORMAL MG/DL
KETONES UR STRIP.AUTO-MCNC: NEGATIVE MG/DL
LEUKOCYTE ESTERASE UR QL STRIP.AUTO: ABNORMAL
NITRITE UR QL STRIP.AUTO: NEGATIVE
PH UR STRIP.AUTO: 6.5 [PH]
PROT UR STRIP.AUTO-MCNC: NEGATIVE MG/DL
RBC # UR STRIP.AUTO: NEGATIVE /UL
RBC #/AREA URNS AUTO: ABNORMAL /HPF
SP GR UR STRIP.AUTO: 1.01
UROBILINOGEN UR STRIP.AUTO-MCNC: NORMAL MG/DL
WBC #/AREA URNS AUTO: ABNORMAL /HPF

## 2024-10-07 PROCEDURE — 81001 URINALYSIS AUTO W/SCOPE: CPT

## 2024-10-07 PROCEDURE — 87086 URINE CULTURE/COLONY COUNT: CPT

## 2024-10-08 LAB — BACTERIA UR CULT: NORMAL

## 2024-11-08 DIAGNOSIS — E78.2 HYPERCHOLESTEROLEMIA WITH HYPERTRIGLYCERIDEMIA: ICD-10-CM

## 2024-11-08 DIAGNOSIS — Z91.89 FRAMINGHAM CARDIAC RISK >20% IN NEXT 10 YEARS: Chronic | ICD-10-CM

## 2024-11-09 RX ORDER — ROSUVASTATIN CALCIUM 5 MG/1
TABLET, COATED ORAL
Qty: 45 TABLET | Refills: 0 | Status: SHIPPED | OUTPATIENT
Start: 2024-11-09

## 2024-11-13 ENCOUNTER — OFFICE VISIT (OUTPATIENT)
Dept: OBGYN CLINIC | Age: 79
End: 2024-11-13
Payer: MEDICARE

## 2024-11-13 VITALS
DIASTOLIC BLOOD PRESSURE: 70 MMHG | SYSTOLIC BLOOD PRESSURE: 126 MMHG | HEIGHT: 63 IN | BODY MASS INDEX: 33.31 KG/M2 | WEIGHT: 188 LBS

## 2024-11-13 DIAGNOSIS — N76.0 OTHER VAGINITIS: ICD-10-CM

## 2024-11-13 DIAGNOSIS — L29.2 VULVAR ITCHING: Primary | ICD-10-CM

## 2024-11-13 DIAGNOSIS — L29.2 VULVAR ITCHING: ICD-10-CM

## 2024-11-13 PROCEDURE — 1123F ACP DISCUSS/DSCN MKR DOCD: CPT | Performed by: OBSTETRICS & GYNECOLOGY

## 2024-11-13 PROCEDURE — G8399 PT W/DXA RESULTS DOCUMENT: HCPCS | Performed by: OBSTETRICS & GYNECOLOGY

## 2024-11-13 PROCEDURE — G8427 DOCREV CUR MEDS BY ELIG CLIN: HCPCS | Performed by: OBSTETRICS & GYNECOLOGY

## 2024-11-13 PROCEDURE — G8484 FLU IMMUNIZE NO ADMIN: HCPCS | Performed by: OBSTETRICS & GYNECOLOGY

## 2024-11-13 PROCEDURE — 99213 OFFICE O/P EST LOW 20 MIN: CPT | Performed by: OBSTETRICS & GYNECOLOGY

## 2024-11-13 PROCEDURE — 1036F TOBACCO NON-USER: CPT | Performed by: OBSTETRICS & GYNECOLOGY

## 2024-11-13 PROCEDURE — G8417 CALC BMI ABV UP PARAM F/U: HCPCS | Performed by: OBSTETRICS & GYNECOLOGY

## 2024-11-13 PROCEDURE — 1090F PRES/ABSN URINE INCON ASSESS: CPT | Performed by: OBSTETRICS & GYNECOLOGY

## 2024-11-13 PROCEDURE — 1159F MED LIST DOCD IN RCRD: CPT | Performed by: OBSTETRICS & GYNECOLOGY

## 2024-11-13 RX ORDER — LATANOPROST 50 UG/ML
SOLUTION/ DROPS OPHTHALMIC
COMMUNITY
Start: 2024-10-05

## 2024-11-13 RX ORDER — NYSTATIN 100000 U/G
CREAM TOPICAL
Qty: 15 G | Refills: 1 | Status: SHIPPED | OUTPATIENT
Start: 2024-11-13 | End: 2024-11-14 | Stop reason: RX

## 2024-11-13 RX ORDER — NYSTATIN AND TRIAMCINOLONE ACETONIDE 100000; 1 [USP'U]/G; MG/G
CREAM TOPICAL
Qty: 30 G | Refills: 0 | Status: CANCELLED | OUTPATIENT
Start: 2024-11-13

## 2024-11-13 RX ORDER — ROSUVASTATIN CALCIUM 5 MG/1
TABLET, COATED ORAL
COMMUNITY
Start: 2024-11-09

## 2024-11-13 RX ORDER — FLUCONAZOLE 150 MG/1
150 TABLET ORAL ONCE
Qty: 1 TABLET | Refills: 1 | Status: SHIPPED | OUTPATIENT
Start: 2024-11-13 | End: 2024-11-14 | Stop reason: RX

## 2024-11-13 SDOH — ECONOMIC STABILITY: INCOME INSECURITY: HOW HARD IS IT FOR YOU TO PAY FOR THE VERY BASICS LIKE FOOD, HOUSING, MEDICAL CARE, AND HEATING?: NOT HARD AT ALL

## 2024-11-13 SDOH — ECONOMIC STABILITY: FOOD INSECURITY: WITHIN THE PAST 12 MONTHS, THE FOOD YOU BOUGHT JUST DIDN'T LAST AND YOU DIDN'T HAVE MONEY TO GET MORE.: NEVER TRUE

## 2024-11-13 SDOH — ECONOMIC STABILITY: FOOD INSECURITY: WITHIN THE PAST 12 MONTHS, YOU WORRIED THAT YOUR FOOD WOULD RUN OUT BEFORE YOU GOT MONEY TO BUY MORE.: NEVER TRUE

## 2024-11-13 ASSESSMENT — ENCOUNTER SYMPTOMS
NAUSEA: 0
VOMITING: 0
CONSTIPATION: 0
ABDOMINAL DISTENTION: 0
EYES NEGATIVE: 1
BLOOD IN STOOL: 0
RESPIRATORY NEGATIVE: 1
ANAL BLEEDING: 0
RECTAL PAIN: 0
ABDOMINAL PAIN: 0
DIARRHEA: 0
ALLERGIC/IMMUNOLOGIC NEGATIVE: 1

## 2024-11-13 NOTE — PROGRESS NOTES
The patient was asked if she would like a chaperone present for her intimate exam. She  Declined the chaperone. Femi Mars CMA (AAMA)    
tenderness, lesion or injury.       Vagina: Normal. No signs of injury and foreign body. No vaginal discharge, erythema, tenderness or bleeding.      Comments: No abnormal discharge.  No cervical or vaginal lesions.  Normal external genitalia.  Musculoskeletal:         General: No tenderness or deformity. Normal range of motion.      Cervical back: Normal range of motion and neck supple.   Skin:     General: Skin is warm and dry.      Coloration: Skin is not pale.   Neurological:      Mental Status: She is alert and oriented to person, place, and time.      Motor: No abnormal muscle tone.      Coordination: Coordination normal.   Psychiatric:         Behavior: Behavior normal.         Thought Content: Thought content normal.         Judgment: Judgment normal.         Assessment:       Diagnosis Orders   1. Vulvar itching  Wet prep, genital      2. Other vaginitis  Wet prep, genital           :      Medications placedthis encounter:  Orders Placed This Encounter   Medications    fluconazole (DIFLUCAN) 150 MG tablet     Sig: Take 1 tablet by mouth once for 1 dose     Dispense:  1 tablet     Refill:  1    nystatin (MYCOSTATIN) 178366 UNIT/GM cream     Sig: Apply topically 2 times daily.     Dispense:  15 g     Refill:  1         Orders placedthis encounter:  Orders Placed This Encounter   Procedures    Wet prep, genital     Standing Status:   Future     Standing Expiration Date:   11/13/2025         Follow up:  Return for Annual.

## 2024-11-14 ENCOUNTER — TELEPHONE (OUTPATIENT)
Dept: OBGYN CLINIC | Age: 79
End: 2024-11-14

## 2024-11-14 LAB
CLUE CELLS VAG QL WET PREP: NORMAL
T VAGINALIS VAG QL WET PREP: NORMAL
TRICHOMONAS VAGINALIS SCREEN: NEGATIVE
YEAST VAG QL WET PREP: NORMAL

## 2024-11-14 RX ORDER — NYSTATIN 100000 U/G
CREAM TOPICAL
Qty: 15 G | Refills: 1 | Status: SHIPPED | OUTPATIENT
Start: 2024-11-14

## 2024-11-14 RX ORDER — FLUCONAZOLE 150 MG/1
150 TABLET ORAL ONCE
Qty: 1 TABLET | Refills: 0 | Status: SHIPPED | OUTPATIENT
Start: 2024-11-14 | End: 2024-11-14

## 2024-11-14 NOTE — TELEPHONE ENCOUNTER
Pt was here yesterday and her prescriptions went to wrong pharmacy. She needs them to go to [facility] in [place] on [address].  She wants the other one cancelled to [facility] so she does not get a charge.

## 2024-11-14 NOTE — TELEPHONE ENCOUNTER
Pt was contacted to make her aware that we had to reach out to the manager to send the medication.  Pt was very unhappy that it was [facility] the first place and wanted that pharmacy taken out of the system.  Pt was reassured that it will be taken out so future mistakes don't happen.

## 2024-11-14 NOTE — TELEPHONE ENCOUNTER
Prescriptions were sent to the wrong pharmacy. Prescriptions cancelled and resent to correct pharmacy

## 2024-11-14 NOTE — TELEPHONE ENCOUNTER
Received call from the patient calling back regarding the status of medication.Patient requesting a call from the medical staff when script is sent to the pharmacy.

## 2024-11-21 ENCOUNTER — APPOINTMENT (OUTPATIENT)
Dept: PRIMARY CARE | Facility: CLINIC | Age: 79
End: 2024-11-21
Payer: MEDICARE

## 2024-11-21 DIAGNOSIS — E78.2 HYPERCHOLESTEROLEMIA WITH HYPERTRIGLYCERIDEMIA: ICD-10-CM

## 2024-11-21 DIAGNOSIS — I10 ESSENTIAL HYPERTENSION: ICD-10-CM

## 2024-11-21 DIAGNOSIS — R73.9 BORDERLINE HYPERGLYCEMIA: ICD-10-CM

## 2024-11-21 DIAGNOSIS — G50.0 TRIGEMINAL NEURALGIA: ICD-10-CM

## 2024-11-21 DIAGNOSIS — E66.09 CLASS 1 OBESITY DUE TO EXCESS CALORIES WITH SERIOUS COMORBIDITY AND BODY MASS INDEX (BMI) OF 34.0 TO 34.9 IN ADULT: ICD-10-CM

## 2024-11-21 DIAGNOSIS — G25.81 RESTLESS LEGS SYNDROME: ICD-10-CM

## 2024-11-21 DIAGNOSIS — E66.811 CLASS 1 OBESITY DUE TO EXCESS CALORIES WITH SERIOUS COMORBIDITY AND BODY MASS INDEX (BMI) OF 34.0 TO 34.9 IN ADULT: ICD-10-CM

## 2024-11-21 DIAGNOSIS — M54.12 CERVICAL RADICULOPATHY: ICD-10-CM

## 2024-11-21 DIAGNOSIS — E55.9 VITAMIN D DEFICIENCY: ICD-10-CM

## 2024-11-21 LAB
25(OH)D3 SERPL-MCNC: 64 NG/ML (ref 30–100)
ALBUMIN SERPL BCP-MCNC: 4.4 G/DL (ref 3.4–5)
ALP SERPL-CCNC: 108 U/L (ref 33–136)
ALT SERPL W P-5'-P-CCNC: 15 U/L (ref 7–45)
ANION GAP SERPL CALC-SCNC: 12 MMOL/L (ref 10–20)
AST SERPL W P-5'-P-CCNC: 16 U/L (ref 9–39)
BASOPHILS # BLD AUTO: 0.02 X10*3/UL (ref 0–0.1)
BASOPHILS NFR BLD AUTO: 0.5 %
BILIRUB SERPL-MCNC: 0.6 MG/DL (ref 0–1.2)
BUN SERPL-MCNC: 14 MG/DL (ref 6–23)
CALCIUM SERPL-MCNC: 9.4 MG/DL (ref 8.6–10.3)
CHLORIDE SERPL-SCNC: 104 MMOL/L (ref 98–107)
CHOLEST SERPL-MCNC: 159 MG/DL (ref 0–199)
CHOLESTEROL/HDL RATIO: 2.8
CK SERPL-CCNC: 45 U/L (ref 0–215)
CO2 SERPL-SCNC: 28 MMOL/L (ref 21–32)
CREAT SERPL-MCNC: 0.7 MG/DL (ref 0.5–1.05)
EGFRCR SERPLBLD CKD-EPI 2021: 89 ML/MIN/1.73M*2
EOSINOPHIL # BLD AUTO: 0.09 X10*3/UL (ref 0–0.4)
EOSINOPHIL NFR BLD AUTO: 2.1 %
ERYTHROCYTE [DISTWIDTH] IN BLOOD BY AUTOMATED COUNT: 12.3 % (ref 11.5–14.5)
GLUCOSE SERPL-MCNC: 103 MG/DL (ref 74–99)
HCT VFR BLD AUTO: 42.4 % (ref 36–46)
HDLC SERPL-MCNC: 57.4 MG/DL
HGB BLD-MCNC: 13.6 G/DL (ref 12–16)
IMM GRANULOCYTES # BLD AUTO: 0.01 X10*3/UL (ref 0–0.5)
IMM GRANULOCYTES NFR BLD AUTO: 0.2 % (ref 0–0.9)
LDLC SERPL CALC-MCNC: 71 MG/DL
LYMPHOCYTES # BLD AUTO: 1.15 X10*3/UL (ref 0.8–3)
LYMPHOCYTES NFR BLD AUTO: 26.9 %
MCH RBC QN AUTO: 30.6 PG (ref 26–34)
MCHC RBC AUTO-ENTMCNC: 32.1 G/DL (ref 32–36)
MCV RBC AUTO: 96 FL (ref 80–100)
MONOCYTES # BLD AUTO: 0.61 X10*3/UL (ref 0.05–0.8)
MONOCYTES NFR BLD AUTO: 14.3 %
NEUTROPHILS # BLD AUTO: 2.39 X10*3/UL (ref 1.6–5.5)
NEUTROPHILS NFR BLD AUTO: 56 %
NON HDL CHOLESTEROL: 102 MG/DL (ref 0–149)
NRBC BLD-RTO: 0 /100 WBCS (ref 0–0)
PLATELET # BLD AUTO: 225 X10*3/UL (ref 150–450)
POTASSIUM SERPL-SCNC: 3.9 MMOL/L (ref 3.5–5.3)
PROT SERPL-MCNC: 6.7 G/DL (ref 6.4–8.2)
RBC # BLD AUTO: 4.44 X10*6/UL (ref 4–5.2)
SODIUM SERPL-SCNC: 140 MMOL/L (ref 136–145)
TRIGL SERPL-MCNC: 151 MG/DL (ref 0–149)
VLDL: 30 MG/DL (ref 0–40)
WBC # BLD AUTO: 4.3 X10*3/UL (ref 4.4–11.3)

## 2024-11-21 PROCEDURE — 82550 ASSAY OF CK (CPK): CPT

## 2024-11-21 PROCEDURE — 85025 COMPLETE CBC W/AUTO DIFF WBC: CPT

## 2024-11-21 PROCEDURE — 80053 COMPREHEN METABOLIC PANEL: CPT

## 2024-11-21 PROCEDURE — 82306 VITAMIN D 25 HYDROXY: CPT

## 2024-11-21 PROCEDURE — 80061 LIPID PANEL: CPT

## 2024-12-03 ENCOUNTER — APPOINTMENT (OUTPATIENT)
Dept: PRIMARY CARE | Facility: CLINIC | Age: 79
End: 2024-12-03
Payer: MEDICARE

## 2024-12-03 VITALS — HEART RATE: 71 BPM | BODY MASS INDEX: 33.66 KG/M2 | TEMPERATURE: 98.3 F | WEIGHT: 190 LBS | OXYGEN SATURATION: 95 %

## 2024-12-03 DIAGNOSIS — E66.811 CLASS 1 OBESITY DUE TO EXCESS CALORIES WITH SERIOUS COMORBIDITY AND BODY MASS INDEX (BMI) OF 33.0 TO 33.9 IN ADULT: ICD-10-CM

## 2024-12-03 DIAGNOSIS — Z91.89 FRAMINGHAM CARDIAC RISK >20% IN NEXT 10 YEARS: Chronic | ICD-10-CM

## 2024-12-03 DIAGNOSIS — I10 ESSENTIAL HYPERTENSION: ICD-10-CM

## 2024-12-03 DIAGNOSIS — E55.9 VITAMIN D DEFICIENCY: ICD-10-CM

## 2024-12-03 DIAGNOSIS — E66.09 CLASS 1 OBESITY DUE TO EXCESS CALORIES WITH SERIOUS COMORBIDITY AND BODY MASS INDEX (BMI) OF 33.0 TO 33.9 IN ADULT: ICD-10-CM

## 2024-12-03 DIAGNOSIS — Z00.00 ROUTINE GENERAL MEDICAL EXAMINATION AT HEALTH CARE FACILITY: Primary | ICD-10-CM

## 2024-12-03 DIAGNOSIS — R19.7 DIARRHEA, UNSPECIFIED TYPE: ICD-10-CM

## 2024-12-03 DIAGNOSIS — E78.2 HYPERCHOLESTEROLEMIA WITH HYPERTRIGLYCERIDEMIA: ICD-10-CM

## 2024-12-03 DIAGNOSIS — K59.04 CHRONIC IDIOPATHIC CONSTIPATION: ICD-10-CM

## 2024-12-03 DIAGNOSIS — R73.9 BORDERLINE HYPERGLYCEMIA: ICD-10-CM

## 2024-12-03 PROCEDURE — 1126F AMNT PAIN NOTED NONE PRSNT: CPT | Performed by: INTERNAL MEDICINE

## 2024-12-03 PROCEDURE — 1123F ACP DISCUSS/DSCN MKR DOCD: CPT | Performed by: INTERNAL MEDICINE

## 2024-12-03 PROCEDURE — 1170F FXNL STATUS ASSESSED: CPT | Performed by: INTERNAL MEDICINE

## 2024-12-03 PROCEDURE — 1157F ADVNC CARE PLAN IN RCRD: CPT | Performed by: INTERNAL MEDICINE

## 2024-12-03 PROCEDURE — 99213 OFFICE O/P EST LOW 20 MIN: CPT | Performed by: INTERNAL MEDICINE

## 2024-12-03 PROCEDURE — G0439 PPPS, SUBSEQ VISIT: HCPCS | Performed by: INTERNAL MEDICINE

## 2024-12-03 PROCEDURE — 1159F MED LIST DOCD IN RCRD: CPT | Performed by: INTERNAL MEDICINE

## 2024-12-03 PROCEDURE — 1160F RVW MEDS BY RX/DR IN RCRD: CPT | Performed by: INTERNAL MEDICINE

## 2024-12-03 RX ORDER — LOPERAMIDE HCL 2 MG
TABLET ORAL
Status: SHIPPED | COMMUNITY
Start: 2024-12-03

## 2024-12-03 RX ORDER — LACTULOSE 10 G/15ML
SOLUTION ORAL
Status: SHIPPED | COMMUNITY
Start: 2024-12-03

## 2024-12-03 ASSESSMENT — ENCOUNTER SYMPTOMS
LOSS OF SENSATION IN FEET: 0
DEPRESSION: 0
OCCASIONAL FEELINGS OF UNSTEADINESS: 0

## 2024-12-03 ASSESSMENT — ACTIVITIES OF DAILY LIVING (ADL)
DOING_HOUSEWORK: INDEPENDENT
TAKING_MEDICATION: INDEPENDENT
BATHING: INDEPENDENT
MANAGING_FINANCES: INDEPENDENT
DRESSING: INDEPENDENT
GROCERY_SHOPPING: INDEPENDENT

## 2024-12-03 ASSESSMENT — PATIENT HEALTH QUESTIONNAIRE - PHQ9
SUM OF ALL RESPONSES TO PHQ9 QUESTIONS 1 AND 2: 0
2. FEELING DOWN, DEPRESSED OR HOPELESS: NOT AT ALL
1. LITTLE INTEREST OR PLEASURE IN DOING THINGS: NOT AT ALL

## 2024-12-03 ASSESSMENT — PAIN SCALES - GENERAL: PAINLEVEL_OUTOF10: 0-NO PAIN

## 2024-12-03 NOTE — PROGRESS NOTES
Continue current Subjective   Reason for Visit: Elvira Dunne is an 79 y.o. female here for a Medicare Wellness visit.     Past Medical, Surgical, and Family History reviewed and updated in chart.         HPI  Patient dealing with domestic stress, but continues to want to be there for her daughter and granddaughters.  Does not wish harm to self or others.  No headache, blurred vision, no diplopia, no dysphagia.  Some adjustment insomnia, but otherwise, no confusion or history of delirium.  Continues to want to improve quality of life, does not wish harm to self or others.    With diet and exercise, has been successful with weight loss.  ENDOCRINE with no polyuria, polydipsia, polyphagia.  No blurred vision.  No skin, hair, nail changes.  No dramatic weight loss or weight gain.      States that she has had improved energy and feels pretty good.  Has been enjoying preparing for [place].      Medicare Wellness visit done today, with discussion regarding DURABLE POWER OF  and CODE STATUS also done today, please see.    Patient Care Team:  Isiah Wheeler MD as PCP - General  [facility]         Review of Systems  Review of systems as in history of present illness, and otherwise, reviewed separately as well, and was unremarkable/negative/noncontributory.        Objective   Vitals:  Pulse 71   Temp 36.8 °C (98.3 °F) (Oral)   Wt 86.2 kg (190 lb)   SpO2 95%   BMI 33.66 kg/m²       Physical Exam  In good spirits.  Not in distress or diaphoresis.  Alert, oriented x 3.  Amiable.  Not unkempt.  Receptive, cheerful, appropriate, and eager to improve quality of life.  Does not wish harm to self or others.    HEAD pink palpebral conjunctivae, anicteric sclerae.  NECK supple, no apparent jugular venous distention.  CARDIOVASCULAR not in distress or diaphoresis.  No bipedal edema.  LUNGS not in distress or diaphoresis.  Not using accessory muscles.  ABDOMEN  soft, nontender.  BACK no costovertebral angle tenderness.  EXTREMITIES no clubbing, no cyanosis.  NEURO no facial asymmetry.  No apparent cranial nerve deficits.  Romberg negative.  Ambulating without need of assistance.  No apparent focal weakness.  No tremors.  PSYCH receptive, appropriate, and eager to maintain and improve quality of life.        LABORATORY results reviewed, with November lipid panel noted, ASCVD risk 22.6%.  Hemoglobin A1c from August 5 0.8, with current body mass index 33.6    Leukopenia noted, urine culture negative.  Again, constitutionally asymptomatic.        ASSESSMENT/Plans:  Elvira was seen today for medicare annual wellness visit subsequent.  Diagnoses and all orders for this visit:  Routine general medical examination at health care facility (Primary)  -     Follow Up In Primary Care - Established; Future  Essential hypertension  -     Follow Up In Primary Care - Established  -     CBC and Auto Differential; Future  -     Comprehensive Metabolic Panel; Future  -     TSH with reflex to Free T4 if abnormal; Future  -     Magnesium; Future  -     Follow Up In Primary Care - Established; Future  Borderline hyperglycemia  -     Comprehensive Metabolic Panel; Future  -     Hemoglobin A1C; Future  -     Follow Up In Primary Care - Established; Future  Hypercholesterolemia with hypertriglyceridemia  -     rosuvastatin (Crestor) 5 mg tablet; Please take ONLY HALF TABLET by mouth at bedtime.  Thank you.  -     Comprehensive Metabolic Panel; Future  -     Lipid Panel; Future  -     Follow Up In Primary Care - Established; Future  [place] cardiac risk >20% in next 10 years  Comments:  22.36% 08/2024  Orders:  -     rosuvastatin (Crestor) 5 mg tablet; Please take ONLY HALF TABLET by mouth at bedtime.  Thank you.  -     Comprehensive Metabolic Panel; Future  -     Hemoglobin A1C; Future  -     Lipid Panel; Future  -     Follow Up In Primary Care - Established; Future  Class 1 obesity due to  excess calories with serious comorbidity and body mass index (BMI) of 33.0 to 33.9 in adult  -     Comprehensive Metabolic Panel; Future  -     TSH with reflex to Free T4 if abnormal; Future  -     Follow Up In Primary Care - Established; Future  Vitamin D deficiency  -     Comprehensive Metabolic Panel; Future  -     Follow Up In Primary Care - Established; Future  Chronic idiopathic constipation  -     Follow Up In Primary Care - Established; Future  Diarrhea, unspecified type  -     Follow Up In Primary Care - Established; Future    Thank you very much for coming.  I am very happy to see you.    We did your Medicare Wellness evaluation today, and you seem to be doing much better.  I am sorry that you are anxious about your daughter and your grandchildren, and that it has been causing you some loss of sleep, but I am glad to hear that you have been physically active, and you have been eating better, and you have been losing weight!    We reviewed your living will wishes, and we will keep your  as your DURABLE POWER OF  for healthcare matters.  This means that if anything were to happen to you, and if you are unable to express your wishes, he will be the person we will contact, and he will be speaking on your behalf and he will be making medical decisions for you.    Your CODE STATUS is FULL CODE, meaning that if you get very sick, we will do extraordinary means of treatment to save your life and to preserve it.  Afterwards, when you are stable, we will discuss with you and your family how you are doing and what your options are.    At the very least, we will try our best to maintain your quality of life and keep you comfortable.    Again, thank very much for coming.  I am glad that you are doing better.  Please come back in 3 months, so that we can repeat your FASTING laboratory examinations any  facility, then see me soon after.  Of course, you can call me sooner with any questions or concerns.   When you return in 3 months, we will discuss how to further improve your overall quality of life and discuss preventive strategies.  Until then, take care and God bless.  I hope you have a good [place] and a happy new year.            0  Return in 3 months.  20 minutes please.  Repeat FASTING laboratory examinations, then see me soon after.  Reassess mood, energy, function, preventive strategies, perhaps options regarding weight management.  Reassess cardiovascular risk.            0

## 2024-12-28 DIAGNOSIS — E78.2 HYPERCHOLESTEROLEMIA WITH HYPERTRIGLYCERIDEMIA: ICD-10-CM

## 2024-12-28 DIAGNOSIS — Z91.89 FRAMINGHAM CARDIAC RISK >20% IN NEXT 10 YEARS: Chronic | ICD-10-CM

## 2024-12-31 PROBLEM — G25.81 RESTLESS LEGS SYNDROME: Status: ACTIVE | Noted: 2024-12-31

## 2024-12-31 PROBLEM — K59.04 CHRONIC IDIOPATHIC CONSTIPATION: Status: ACTIVE | Noted: 2024-12-31

## 2024-12-31 RX ORDER — ROSUVASTATIN CALCIUM 5 MG/1
TABLET, COATED ORAL
Qty: 45 TABLET | Refills: 0 | Status: SHIPPED | OUTPATIENT
Start: 2024-12-31

## 2025-01-01 NOTE — PATIENT INSTRUCTIONS
Thank you very much for coming.  I am very happy to see you.    We did your Medicare Wellness evaluation today, and you seem to be doing much better.  I am sorry that you are anxious about your daughter and your grandchildren, and that it has been causing you some loss of sleep, but I am glad to hear that you have been physically active, and you have been eating better, and you have been losing weight!    We reviewed your living will wishes, and we will keep your  as your DURABLE POWER OF  for healthcare matters.  This means that if anything were to happen to you, and if you are unable to express your wishes, he will be the person we will contact, and he will be speaking on your behalf and he will be making medical decisions for you.    Your CODE STATUS is FULL CODE, meaning that if you get very sick, we will do extraordinary means of treatment to save your life and to preserve it.  Afterwards, when you are stable, we will discuss with you and your family how you are doing and what your options are.    At the very least, we will try our best to maintain your quality of life and keep you comfortable.    Again, thank very much for coming.  I am glad that you are doing better.  Please come back in 3 months, so that we can repeat your FASTING laboratory examinations any  facility, then see me soon after.  Of course, you can call me sooner with any questions or concerns.  When you return in 3 months, we will discuss how to further improve your overall quality of life and discuss preventive strategies.  Until then, take care and God bless.  I hope you have a good [place] and a happy new year.            0  Return in 3 months.  20 minutes please.  Repeat FASTING laboratory examinations, then see me soon after.  Reassess mood, energy, function, preventive strategies, perhaps options regarding weight management.  Reassess cardiovascular risk.            0

## 2025-01-02 ENCOUNTER — TELEPHONE (OUTPATIENT)
Dept: PRIMARY CARE | Facility: CLINIC | Age: 80
End: 2025-01-02

## 2025-01-06 ENCOUNTER — OFFICE VISIT (OUTPATIENT)
Dept: OBGYN CLINIC | Age: 80
End: 2025-01-06
Payer: MEDICARE

## 2025-01-06 VITALS
HEIGHT: 63 IN | WEIGHT: 187 LBS | DIASTOLIC BLOOD PRESSURE: 74 MMHG | BODY MASS INDEX: 33.13 KG/M2 | SYSTOLIC BLOOD PRESSURE: 130 MMHG

## 2025-01-06 DIAGNOSIS — Z01.419 ENCOUNTER FOR WELL WOMAN EXAM WITH ROUTINE GYNECOLOGICAL EXAM: Primary | ICD-10-CM

## 2025-01-06 DIAGNOSIS — Z12.31 SCREENING MAMMOGRAM FOR BREAST CANCER: ICD-10-CM

## 2025-01-06 PROCEDURE — 1159F MED LIST DOCD IN RCRD: CPT | Performed by: OBSTETRICS & GYNECOLOGY

## 2025-01-06 PROCEDURE — 1160F RVW MEDS BY RX/DR IN RCRD: CPT | Performed by: OBSTETRICS & GYNECOLOGY

## 2025-01-06 PROCEDURE — G0101 CA SCREEN;PELVIC/BREAST EXAM: HCPCS | Performed by: OBSTETRICS & GYNECOLOGY

## 2025-01-06 ASSESSMENT — PATIENT HEALTH QUESTIONNAIRE - PHQ9
SUM OF ALL RESPONSES TO PHQ QUESTIONS 1-9: 0
SUM OF ALL RESPONSES TO PHQ QUESTIONS 1-9: 0
1. LITTLE INTEREST OR PLEASURE IN DOING THINGS: NOT AT ALL
SUM OF ALL RESPONSES TO PHQ QUESTIONS 1-9: 0
SUM OF ALL RESPONSES TO PHQ9 QUESTIONS 1 & 2: 0
SUM OF ALL RESPONSES TO PHQ QUESTIONS 1-9: 0
2. FEELING DOWN, DEPRESSED OR HOPELESS: NOT AT ALL

## 2025-01-06 ASSESSMENT — ENCOUNTER SYMPTOMS
EYES NEGATIVE: 1
ABDOMINAL PAIN: 0
NAUSEA: 0
RECTAL PAIN: 0
BLOOD IN STOOL: 0
DIARRHEA: 0
ABDOMINAL DISTENTION: 0
CONSTIPATION: 0
RESPIRATORY NEGATIVE: 1
ANAL BLEEDING: 0
ALLERGIC/IMMUNOLOGIC NEGATIVE: 1
VOMITING: 0

## 2025-01-06 ASSESSMENT — VISUAL ACUITY: OU: 1

## 2025-01-06 NOTE — PROGRESS NOTES
The patient was asked if she would like a chaperone present for her intimate exam. She  Declined the chaperone. Femi Mars CMA (AAMA)

## 2025-01-06 NOTE — PROGRESS NOTES
Subjective:      Patient ID: Leonor Powers is a 79 y.o. female    Annual exam. Reviewed medical, surgical, social and family history.  Also reviewed current medications and allergies.  No PMB.  No GYN complaints.  Pap deferred.  Screening mammogram ordered.  Monthly SBE encouraged.  Dexa scan ordered per protocol.  Screening colonoscopy recommended per routine.  F/U annual exam or prn.    Vitals:  LMP 1999   Past Medical History:   Diagnosis Date    Anxiety     Arthritis     both knees    Colon polyps     Glaucoma 2017    Meningitis     Osteopenia     PONV (postoperative nausea and vomiting)     Rotator cuff injury     Sciatica     Shingles      Past Surgical History:   Procedure Laterality Date    CARPAL TUNNEL RELEASE Left s     SECTION  1972  1976     CHOLECYSTECTOMY      COLONOSCOPY      COLONOSCOPY  2018    polyp removal    ENDOSCOPY, COLON, DIAGNOSTIC      EYE SURGERY  2000s    phaco with IOL OU    EYE SURGERY      Lasik OU    TONSILLECTOMY AND ADENOIDECTOMY      as child    TOTAL KNEE ARTHROPLASTY Right 2019    RIGHT TOTAL KNEE ARTHROPLASTY, SUPINE, MARY PSI performed by Misha Desai [place] [facility]    WRIST GANGLION EXCISION Bilateral     right  / left      Allergies:  Phenergan [promethazine hcl]  Current Outpatient Medications   Medication Sig Dispense Refill    nystatin (MYCOSTATIN) 669019 UNIT/GM cream Apply topically 2 times daily. 15 g 1    rosuvastatin (CRESTOR) 5 MG tablet Please take ONLY HALF TABLET by mouth at bedtime.  Thank you.      latanoprost (XALATAN) 0.005 % ophthalmic solution INSTILL 1 DROP INTO BOTH EYES IN THE EVENING      CRANBERRY PO Take by mouth      Multiple Vitamins-Minerals (MULTIVITAMIN ADULTS PO) Take by mouth      acetaminophen (TYLENOL) 500 MG tablet Take 1 tablet by mouth every 6 hours as needed for Pain      calcium-vitamin D (OSCAL-500) 500-200 MG-UNIT per tablet Take 1 tablet by mouth daily      vitamin C (ASCORBIC

## 2025-01-20 ENCOUNTER — HOSPITAL ENCOUNTER (OUTPATIENT)
Dept: RADIOLOGY | Facility: HOSPITAL | Age: 80
Discharge: HOME | End: 2025-01-20
Payer: MEDICARE

## 2025-01-20 ENCOUNTER — LAB (OUTPATIENT)
Dept: LAB | Facility: LAB | Age: 80
End: 2025-01-20
Payer: MEDICARE

## 2025-01-20 DIAGNOSIS — N20.0 CALCULUS OF KIDNEY: ICD-10-CM

## 2025-01-20 DIAGNOSIS — R30.0 DYSURIA: ICD-10-CM

## 2025-01-20 DIAGNOSIS — N28.1 CYST OF KIDNEY, ACQUIRED: ICD-10-CM

## 2025-01-20 DIAGNOSIS — N39.0 URINARY TRACT INFECTION, SITE NOT SPECIFIED: ICD-10-CM

## 2025-01-20 DIAGNOSIS — R35.0 FREQUENCY OF MICTURITION: ICD-10-CM

## 2025-01-20 DIAGNOSIS — N31.8 OTHER NEUROMUSCULAR DYSFUNCTION OF BLADDER: ICD-10-CM

## 2025-01-20 DIAGNOSIS — N39.0 URINARY TRACT INFECTION, SITE NOT SPECIFIED: Primary | ICD-10-CM

## 2025-01-20 LAB
ANION GAP SERPL CALC-SCNC: 11 MMOL/L (ref 10–20)
APPEARANCE UR: CLEAR
BACTERIA #/AREA URNS AUTO: ABNORMAL /HPF
BASOPHILS # BLD AUTO: 0.02 X10*3/UL (ref 0–0.1)
BASOPHILS NFR BLD AUTO: 0.5 %
BILIRUB UR STRIP.AUTO-MCNC: NEGATIVE MG/DL
BUN SERPL-MCNC: 15 MG/DL (ref 6–23)
CALCIUM SERPL-MCNC: 9.3 MG/DL (ref 8.6–10.3)
CHLORIDE SERPL-SCNC: 106 MMOL/L (ref 98–107)
CO2 SERPL-SCNC: 28 MMOL/L (ref 21–32)
COLOR UR: COLORLESS
CREAT SERPL-MCNC: 0.64 MG/DL (ref 0.5–1.05)
EGFRCR SERPLBLD CKD-EPI 2021: >90 ML/MIN/1.73M*2
EOSINOPHIL # BLD AUTO: 0.05 X10*3/UL (ref 0–0.4)
EOSINOPHIL NFR BLD AUTO: 1.3 %
ERYTHROCYTE [DISTWIDTH] IN BLOOD BY AUTOMATED COUNT: 12.7 % (ref 11.5–14.5)
GLUCOSE SERPL-MCNC: 108 MG/DL (ref 74–99)
GLUCOSE UR STRIP.AUTO-MCNC: NORMAL MG/DL
HCT VFR BLD AUTO: 40.2 % (ref 36–46)
HGB BLD-MCNC: 13.2 G/DL (ref 12–16)
HOLD SPECIMEN: NORMAL
IMM GRANULOCYTES # BLD AUTO: 0.02 X10*3/UL (ref 0–0.5)
IMM GRANULOCYTES NFR BLD AUTO: 0.5 % (ref 0–0.9)
KETONES UR STRIP.AUTO-MCNC: NEGATIVE MG/DL
LEUKOCYTE ESTERASE UR QL STRIP.AUTO: ABNORMAL
LYMPHOCYTES # BLD AUTO: 1.23 X10*3/UL (ref 0.8–3)
LYMPHOCYTES NFR BLD AUTO: 32 %
MCH RBC QN AUTO: 31 PG (ref 26–34)
MCHC RBC AUTO-ENTMCNC: 32.8 G/DL (ref 32–36)
MCV RBC AUTO: 94 FL (ref 80–100)
MONOCYTES # BLD AUTO: 0.49 X10*3/UL (ref 0.05–0.8)
MONOCYTES NFR BLD AUTO: 12.8 %
NEUTROPHILS # BLD AUTO: 2.03 X10*3/UL (ref 1.6–5.5)
NEUTROPHILS NFR BLD AUTO: 52.9 %
NITRITE UR QL STRIP.AUTO: NEGATIVE
NRBC BLD-RTO: 0 /100 WBCS (ref 0–0)
PH UR STRIP.AUTO: 6.5 [PH]
PLATELET # BLD AUTO: 214 X10*3/UL (ref 150–450)
POTASSIUM SERPL-SCNC: 4 MMOL/L (ref 3.5–5.3)
PROT UR STRIP.AUTO-MCNC: NEGATIVE MG/DL
RBC # BLD AUTO: 4.26 X10*6/UL (ref 4–5.2)
RBC # UR STRIP.AUTO: ABNORMAL /UL
RBC #/AREA URNS AUTO: ABNORMAL /HPF
SODIUM SERPL-SCNC: 141 MMOL/L (ref 136–145)
SP GR UR STRIP.AUTO: 1
UROBILINOGEN UR STRIP.AUTO-MCNC: NORMAL MG/DL
WBC # BLD AUTO: 3.8 X10*3/UL (ref 4.4–11.3)
WBC #/AREA URNS AUTO: ABNORMAL /HPF

## 2025-01-20 PROCEDURE — 76770 US EXAM ABDO BACK WALL COMP: CPT

## 2025-01-20 PROCEDURE — 80048 BASIC METABOLIC PNL TOTAL CA: CPT

## 2025-01-20 PROCEDURE — 85025 COMPLETE CBC W/AUTO DIFF WBC: CPT

## 2025-01-20 PROCEDURE — 76770 US EXAM ABDO BACK WALL COMP: CPT | Performed by: STUDENT IN AN ORGANIZED HEALTH CARE EDUCATION/TRAINING PROGRAM

## 2025-01-20 PROCEDURE — 81001 URINALYSIS AUTO W/SCOPE: CPT

## 2025-01-20 PROCEDURE — 87086 URINE CULTURE/COLONY COUNT: CPT

## 2025-01-22 LAB
BACTERIA UR CULT: NORMAL
BACTERIA UR CULT: NORMAL

## 2025-02-10 ENCOUNTER — TELEMEDICINE (OUTPATIENT)
Dept: PRIMARY CARE | Facility: CLINIC | Age: 80
End: 2025-02-10
Payer: MEDICARE

## 2025-02-10 DIAGNOSIS — J30.89 NON-SEASONAL ALLERGIC RHINITIS, UNSPECIFIED TRIGGER: ICD-10-CM

## 2025-02-10 DIAGNOSIS — E86.0 DEHYDRATION: ICD-10-CM

## 2025-02-10 DIAGNOSIS — R73.9 BORDERLINE HYPERGLYCEMIA: ICD-10-CM

## 2025-02-10 DIAGNOSIS — J20.9 ACUTE BRONCHITIS, UNSPECIFIED ORGANISM: Primary | ICD-10-CM

## 2025-02-10 PROCEDURE — 99213 OFFICE O/P EST LOW 20 MIN: CPT | Performed by: INTERNAL MEDICINE

## 2025-02-10 PROCEDURE — 1157F ADVNC CARE PLAN IN RCRD: CPT | Performed by: INTERNAL MEDICINE

## 2025-02-10 PROCEDURE — 1123F ACP DISCUSS/DSCN MKR DOCD: CPT | Performed by: INTERNAL MEDICINE

## 2025-02-10 RX ORDER — AMOXICILLIN AND CLAVULANATE POTASSIUM 875; 125 MG/1; MG/1
TABLET, FILM COATED ORAL
Qty: 10 TABLET | Refills: 0 | Status: SHIPPED | OUTPATIENT
Start: 2025-02-10

## 2025-02-10 NOTE — PATIENT INSTRUCTIONS
Thank you very much for allowing me to visit you in the comfort and safety of your home via TELEMEDICINE.  It is nice to see you on TV!    I do understand that you have been having a NAGGING COUGH.  It is likely related to allergy and IRRITATION.  Lots of fluids throughout the day.  Hot tea with honey especially helpful.  Warm salt water gargles will get rid of the debris and irritation in your throat.  Honey lemon drops will also help soothe your throat.    You had a short bout with COLORED PHLEGM.  You will benefit from ANTIBIOTICS.  Generic Augmentin AMOXICILLIN/CLAVULANATE 875 mg.  Take this please with breakfast, and again with supper.  Keep your doses 10 to 12 hours apart.  Always with food please.  You will benefit from a 5-day course of antibiotics.    You will also benefit from PLAIN MUCINEX/GUAIFENESIN 1200 mg.  Take this with breakfast, and again at 5 PM.  This way, you will be able to bring up your secretions and sputum more easily during the daytime while you are awake.  This will minimize your need to cough and clear your throat in the middle of the night.    If needed, you can use some NYQUIL.  Take 30 mL at bedtime as needed to control your coughing, so that you can sleep through the night.  No driving for 6 hours.  Never drive if you are drowsy.  Do not take with alcohol.  NyQuil can make you excessively sleepy.  Be careful.  Take only as needed.    Sleep with your head and torso elevated with 3 or 4 pillows, or you can even try to sleep in a recliner until you are better.    Please call if you are no better in 3 days.  Until then, please continue to take care of yourself and each other, and please continue to pray for our recovery from this pandemic.  Take care and God bless.            0

## 2025-02-10 NOTE — PROGRESS NOTES
Subjective   Patient ID: Elvira Dunne is a 79 y.o. female who presents for Virtual Visit (Pt being seen virtually for c/o cough).    HPI   5-day history of nagging incessant cough, with some yellow sputum production.  The patient tested herself for COVID, with NEGATIVE results, but it is worthwhile to note that she did have a history of COVID bronchitis February 2023.    Some modest yellow sputum production.  Otherwise, no odynophagia or dysphagia.  No particular shortness of breath.  No particular wheezing.  No rodney substernal chest pain, no orthopnea, no paroxysmal nocturnal dyspnea.  CONSTITUTIONALLY, no fever, no chills.  No night sweats.  No lingering anorexia or nausea.  No apparent lymphadenopathy.  No apparent weight loss.    The patient is to undergo eye surgery soon, and was wondering if she could have better control of her symptoms.  Does want to improve quality of life, does not wish harm to self or others.  No headache, blurred vision, no diplopia.  No dysphagia.    No abdominal distress.  No vulvar pruritus.  Seen by UROLOGY, Dr. D'Amico, with latest urine studies negative.  No diarrhea.    Likewise, no particular skin changes.        Review of Systems  Review of systems as in history of present illness, and otherwise, reviewed separately as well, and was unremarkable/negative/noncontributory.        Objective   There were no vitals taken for this visit.    Physical Exam  Some episodes of distress with paroxysms of cough, modest use of accessory muscles.  Some mild hoarseness noted.  Otherwise, the patient remains alert, oriented x 3.  Amiable.  Not unkempt.  Appropriate.  Eager to get better.  Not wishing harm to self or others.    HEAD no scleral icterus.  CARDIOPULMONARY able to speak in short but complete sentences.  Some occasional use of accessory muscles with paroxysms of coughing.  MUSCULOSKELETAL able to maintain position in space and manipulate surroundings as needed.  INTEGUMENTARY no  apparent cyanosis or jaundice.  NEUROLOGIC with no apparent focal weakness, no tremors.  PSYCHIATRIC remaining appropriate.  Eager to get better.        LABORATORY results noted, with preserved liver and kidney function in the past.  History of LEUKOPENIA, has been stable.  Hemoglobin A1c August 2024 5.8.        Assessment/Plan   Diagnoses and all orders for this visit:  Acute bronchitis, unspecified organism  -     amoxicillin-pot clavulanate (Augmentin) 875-125 mg tablet; Please take 1 tablet by mouth with breakfast, and again 1 tablet by mouth with supper.  Keep doses 10 to 12 hours apart.  Drink lots of fluids throughout the day.  Thank you.  Dehydration  Non-seasonal allergic rhinitis, unspecified trigger  Borderline hyperglycemia       Thank you very much for allowing me to visit you in the comfort and safety of your home via TELEMEDICINE.  It is nice to see you on TV!    I do understand that you have been having a NAGGING COUGH.  It is likely related to allergy and IRRITATION.  Lots of fluids throughout the day.  Hot tea with honey especially helpful.  Warm salt water gargles will get rid of the debris and irritation in your throat.  Honey lemon drops will also help soothe your throat.    You had a short bout with COLORED PHLEGM.  You will benefit from ANTIBIOTICS.  Generic Augmentin AMOXICILLIN/CLAVULANATE 875 mg.  Take this please with breakfast, and again with supper.  Keep your doses 10 to 12 hours apart.  Always with food please.  You will benefit from a 5-day course of antibiotics.    You will also benefit from PLAIN MUCINEX/GUAIFENESIN 1200 mg.  Take this with breakfast, and again at 5 PM.  This way, you will be able to bring up your secretions and sputum more easily during the daytime while you are awake.  This will minimize your need to cough and clear your throat in the middle of the night.    If needed, you can use some NYQUIL.  Take 30 mL at bedtime as needed to control your coughing, so that you  can sleep through the night.  No driving for 6 hours.  Never drive if you are drowsy.  Do not take with alcohol.  NyQuil can make you excessively sleepy.  Be careful.  Take only as needed.    Sleep with your head and torso elevated with 3 or 4 pillows, or you can even try to sleep in a recliner until you are better.    Please call if you are no better in 3 days.  Until then, please continue to take care of yourself and each other, and please continue to pray for our recovery from this pandemic.  Take care and God bless.            0

## 2025-02-11 ENCOUNTER — TELEPHONE (OUTPATIENT)
Dept: PRIMARY CARE | Facility: CLINIC | Age: 80
End: 2025-02-11
Payer: MEDICARE

## 2025-02-17 NOTE — TELEPHONE ENCOUNTER
02/12/2025 Spoke to Dr. Wheeler, regarding if it was Ok to take both medication. He told, me to tell her it was OK. Due to the Nitrofurantion being a maintenance drug for her UTI.

## 2025-02-18 ENCOUNTER — TELEPHONE (OUTPATIENT)
Dept: PRIMARY CARE | Facility: CLINIC | Age: 80
End: 2025-02-18
Payer: MEDICARE

## 2025-03-04 ENCOUNTER — APPOINTMENT (OUTPATIENT)
Dept: PRIMARY CARE | Facility: CLINIC | Age: 80
End: 2025-03-04
Payer: MEDICARE

## 2025-03-04 DIAGNOSIS — R73.9 BORDERLINE HYPERGLYCEMIA: ICD-10-CM

## 2025-03-04 DIAGNOSIS — Z91.89 FRAMINGHAM CARDIAC RISK >20% IN NEXT 10 YEARS: Chronic | ICD-10-CM

## 2025-03-04 DIAGNOSIS — I10 ESSENTIAL HYPERTENSION: ICD-10-CM

## 2025-03-04 DIAGNOSIS — E66.811 CLASS 1 OBESITY DUE TO EXCESS CALORIES WITH SERIOUS COMORBIDITY AND BODY MASS INDEX (BMI) OF 33.0 TO 33.9 IN ADULT: ICD-10-CM

## 2025-03-04 DIAGNOSIS — E78.2 HYPERCHOLESTEROLEMIA WITH HYPERTRIGLYCERIDEMIA: ICD-10-CM

## 2025-03-04 DIAGNOSIS — E55.9 VITAMIN D DEFICIENCY: ICD-10-CM

## 2025-03-04 DIAGNOSIS — E66.09 CLASS 1 OBESITY DUE TO EXCESS CALORIES WITH SERIOUS COMORBIDITY AND BODY MASS INDEX (BMI) OF 33.0 TO 33.9 IN ADULT: ICD-10-CM

## 2025-03-04 NOTE — PROGRESS NOTES
Subjective   Patient ID: Elvira Dunne is a 80 y.o. female who presents for Labs Only (Pt in today for lab draw).    HPI     Review of Systems    Objective   There were no vitals taken for this visit.    Physical Exam    Assessment/Plan

## 2025-03-07 ENCOUNTER — HOSPITAL ENCOUNTER (OUTPATIENT)
Dept: WOMENS IMAGING | Age: 80
Discharge: HOME | End: 2025-03-09
Payer: MEDICARE

## 2025-03-07 VITALS — BODY MASS INDEX: 33.13 KG/M2 | HEIGHT: 63 IN

## 2025-03-07 DIAGNOSIS — Z12.31 SCREENING MAMMOGRAM FOR BREAST CANCER: ICD-10-CM

## 2025-03-07 LAB
ALBUMIN SERPL-MCNC: 4.3 G/DL (ref 3.6–5.1)
ALP SERPL-CCNC: 119 U/L (ref 37–153)
ALT SERPL-CCNC: 13 U/L (ref 6–29)
ANION GAP SERPL CALCULATED.4IONS-SCNC: 14 MMOL/L (CALC) (ref 7–17)
AST SERPL-CCNC: 14 U/L (ref 10–35)
BASOPHILS # BLD AUTO: 22 CELLS/UL (ref 0–200)
BASOPHILS NFR BLD AUTO: 0.5 %
BILIRUB SERPL-MCNC: 0.5 MG/DL (ref 0.2–1.2)
BUN SERPL-MCNC: 17 MG/DL (ref 7–25)
CALCIUM SERPL-MCNC: 9.2 MG/DL (ref 8.6–10.4)
CHLORIDE SERPL-SCNC: 106 MMOL/L (ref 98–110)
CHOLEST SERPL-MCNC: 171 MG/DL
CHOLEST/HDLC SERPL: 2.5 (CALC)
CO2 SERPL-SCNC: 23 MMOL/L (ref 20–32)
CREAT SERPL-MCNC: 0.68 MG/DL (ref 0.6–1)
EGFRCR SERPLBLD CKD-EPI 2021: 89 ML/MIN/1.73M2
EOSINOPHIL # BLD AUTO: 69 CELLS/UL (ref 15–500)
EOSINOPHIL NFR BLD AUTO: 1.6 %
ERYTHROCYTE [DISTWIDTH] IN BLOOD BY AUTOMATED COUNT: 12.7 % (ref 11–15)
EST. AVERAGE GLUCOSE BLD GHB EST-MCNC: 126 MG/DL
EST. AVERAGE GLUCOSE BLD GHB EST-SCNC: 7 MMOL/L
GLUCOSE SERPL-MCNC: 104 MG/DL (ref 65–99)
HBA1C MFR BLD: 6 % OF TOTAL HGB
HCT VFR BLD AUTO: 40.4 % (ref 35–45)
HDLC SERPL-MCNC: 68 MG/DL
HGB BLD-MCNC: 13 G/DL (ref 11.7–15.5)
LDLC SERPL CALC-MCNC: 83 MG/DL (CALC)
LYMPHOCYTES # BLD AUTO: 1380 CELLS/UL (ref 850–3900)
LYMPHOCYTES NFR BLD AUTO: 32.1 %
MAGNESIUM SERPL-MCNC: 2.3 MG/DL (ref 1.5–2.5)
MCH RBC QN AUTO: 30.3 PG (ref 27–33)
MCHC RBC AUTO-ENTMCNC: 32.2 G/DL (ref 32–36)
MCV RBC AUTO: 94.2 FL (ref 80–100)
MONOCYTES # BLD AUTO: 529 CELLS/UL (ref 200–950)
MONOCYTES NFR BLD AUTO: 12.3 %
NEUTROPHILS # BLD AUTO: 2301 CELLS/UL (ref 1500–7800)
NEUTROPHILS NFR BLD AUTO: 53.5 %
NONHDLC SERPL-MCNC: 103 MG/DL (CALC)
PLATELET # BLD AUTO: 210 THOUSAND/UL (ref 140–400)
PMV BLD REES-ECKER: 10.9 FL (ref 7.5–12.5)
POTASSIUM SERPL-SCNC: 4.1 MMOL/L (ref 3.5–5.3)
PROT SERPL-MCNC: 6.5 G/DL (ref 6.1–8.1)
RBC # BLD AUTO: 4.29 MILLION/UL (ref 3.8–5.1)
SODIUM SERPL-SCNC: 143 MMOL/L (ref 135–146)
TRIGL SERPL-MCNC: 102 MG/DL
TSH SERPL-ACNC: 2.22 MIU/L (ref 0.4–4.5)
WBC # BLD AUTO: 4.3 THOUSAND/UL (ref 3.8–10.8)

## 2025-03-07 PROCEDURE — 77063 BREAST TOMOSYNTHESIS BI: CPT

## 2025-03-11 ENCOUNTER — APPOINTMENT (OUTPATIENT)
Dept: PRIMARY CARE | Facility: CLINIC | Age: 80
End: 2025-03-11
Payer: MEDICARE

## 2025-03-11 VITALS
HEIGHT: 63 IN | WEIGHT: 185 LBS | BODY MASS INDEX: 32.78 KG/M2 | OXYGEN SATURATION: 96 % | HEART RATE: 76 BPM | SYSTOLIC BLOOD PRESSURE: 124 MMHG | DIASTOLIC BLOOD PRESSURE: 70 MMHG

## 2025-03-11 DIAGNOSIS — E78.2 HYPERCHOLESTEROLEMIA WITH HYPERTRIGLYCERIDEMIA: Primary | ICD-10-CM

## 2025-03-11 DIAGNOSIS — E53.8 VITAMIN B12 DEFICIENCY: ICD-10-CM

## 2025-03-11 DIAGNOSIS — E66.811 CLASS 1 OBESITY DUE TO EXCESS CALORIES WITH SERIOUS COMORBIDITY AND BODY MASS INDEX (BMI) OF 32.0 TO 32.9 IN ADULT: ICD-10-CM

## 2025-03-11 DIAGNOSIS — E66.09 CLASS 1 OBESITY DUE TO EXCESS CALORIES WITH SERIOUS COMORBIDITY AND BODY MASS INDEX (BMI) OF 32.0 TO 32.9 IN ADULT: ICD-10-CM

## 2025-03-11 DIAGNOSIS — M75.101 ROTATOR CUFF SYNDROME OF RIGHT SHOULDER: ICD-10-CM

## 2025-03-11 DIAGNOSIS — E55.9 VITAMIN D DEFICIENCY: ICD-10-CM

## 2025-03-11 DIAGNOSIS — Z91.89 FRAMINGHAM CARDIAC RISK >20% IN NEXT 10 YEARS: Chronic | ICD-10-CM

## 2025-03-11 DIAGNOSIS — J30.89 NON-SEASONAL ALLERGIC RHINITIS, UNSPECIFIED TRIGGER: ICD-10-CM

## 2025-03-11 DIAGNOSIS — I10 ESSENTIAL HYPERTENSION: ICD-10-CM

## 2025-03-11 DIAGNOSIS — N39.0 RECURRENT UTI: ICD-10-CM

## 2025-03-11 DIAGNOSIS — R73.9 BORDERLINE HYPERGLYCEMIA: ICD-10-CM

## 2025-03-11 PROCEDURE — 1036F TOBACCO NON-USER: CPT | Performed by: INTERNAL MEDICINE

## 2025-03-11 PROCEDURE — 1160F RVW MEDS BY RX/DR IN RCRD: CPT | Performed by: INTERNAL MEDICINE

## 2025-03-11 PROCEDURE — 3078F DIAST BP <80 MM HG: CPT | Performed by: INTERNAL MEDICINE

## 2025-03-11 PROCEDURE — 3074F SYST BP LT 130 MM HG: CPT | Performed by: INTERNAL MEDICINE

## 2025-03-11 PROCEDURE — 1123F ACP DISCUSS/DSCN MKR DOCD: CPT | Performed by: INTERNAL MEDICINE

## 2025-03-11 PROCEDURE — G2211 COMPLEX E/M VISIT ADD ON: HCPCS | Performed by: INTERNAL MEDICINE

## 2025-03-11 PROCEDURE — 1157F ADVNC CARE PLAN IN RCRD: CPT | Performed by: INTERNAL MEDICINE

## 2025-03-11 PROCEDURE — 99213 OFFICE O/P EST LOW 20 MIN: CPT | Performed by: INTERNAL MEDICINE

## 2025-03-11 PROCEDURE — 1159F MED LIST DOCD IN RCRD: CPT | Performed by: INTERNAL MEDICINE

## 2025-03-11 RX ORDER — FAMCICLOVIR 500 MG/1
1 TABLET ORAL
COMMUNITY
Start: 2025-01-27

## 2025-03-11 NOTE — PROGRESS NOTES
"Subjective   Patient ID: Elvira Dunne is a 80 y.o. female who presents for Follow-up (Patient presented today for a 3 month follow up./).    HPI     Review of Systems    Objective   /70 (BP Location: Left arm, Patient Position: Sitting, BP Cuff Size: Adult)   Pulse 76   Ht 1.6 m (5' 3\")   Wt 83.9 kg (185 lb)   SpO2 96%   BMI 32.77 kg/m²     Physical Exam    Assessment/Plan   Diagnoses and all orders for this visit:  Hypercholesterolemia with hypertriglyceridemia  -     Follow Up In Primary Care - Established; Future  -     Comprehensive Metabolic Panel; Future  -     Lipid Panel; Future  Borderline hyperglycemia  -     Follow Up In Primary Care - [facility]; Future  -     Comprehensive Metabolic Panel; Future  -     Urinalysis with Reflex Culture and Microscopic; Future  -     Hemoglobin A1C; Future  Essential hypertension  -     Follow Up In Primary Care - [facility]; Future  -     CBC and Auto Differential; Future  -     Comprehensive Metabolic Panel; Future  -     TSH with reflex to Free T4 if abnormal; Future  -     Magnesium; Future  -     Urinalysis with Reflex Culture and Microscopic; Future  [place] cardiac risk >20% in next 10 years  Comments:  23.8% 03/2025  Orders:  -     Follow Up In Primary Care - [facility]; Future  -     Comprehensive Metabolic Panel; Future  -     Lipid Panel; Future  -     Hemoglobin A1C; Future  Class 1 obesity due to excess calories with serious comorbidity and body mass index (BMI) of 32.0 to 32.9 in adult  -     Follow Up In Primary Care - [facility]; Future  -     Comprehensive Metabolic Panel; Future  -     TSH with reflex to Free T4 if abnormal; Future  Vitamin D deficiency  Comments:  64 11/24  Orders:  -     Follow Up In Primary Care - [facility]; Future  -     Comprehensive Metabolic Panel; Future  -     Vitamin D 25-Hydroxy,Total (for eval of Vitamin D levels); Future  Non-seasonal allergic rhinitis, unspecified trigger  -     Follow Up In Primary " Care - Established; Future  -     CBC and Auto Differential; Future  -     Magnesium; Future  Recurrent UTI  -     Follow Up In Primary Care - Established; Future  -     CBC and Auto Differential; Future  -     Comprehensive Metabolic Panel; Future  -     Urinalysis with Reflex Culture and Microscopic; Future  Vitamin B12 deficiency  -     Follow Up In Primary Care - Established; Future  -     CBC and Auto Differential; Future  -     Vitamin B12; Future  Rotator cuff syndrome of right shoulder  -     Follow Up In Primary Care - Established; Future  -     CBC and Auto Differential; Future  -     Comprehensive Metabolic Panel; Future       Thank very much for coming.  I am very happy to see you.    Thank you very much for taking care of yourself.  Thank you for taking her medications.  Please continue eating sensibly.  [employer] diet book, DASH diet, Mediterranean diet for ideas!    Please increase physical activity.  Increase your BRISK WALKING.  This will help not only with weight management, but also with blood pressure, as well as mood and energy.  It can also help you with your sleep.    Please do not stay up late unless you really have to.    Thank you for having your LABORATORY examinations done.  Your results are all very good.  Your marker for sugar is good.  Your marker for cholesterol is very good.  Your kidneys are okay, your liver is okay.    Please come back in 6 months.  Let us repeat some FASTING laboratory examinations, then see me soon after.  Until then, please call for refills.  Please continue to take care of yourself and each other, and please continue to pray for our recovery from this pandemic.    Please call me if you ever develop fever 101 °F, or colored phlegm or nasal discharge.  You may need an appointment and may be some antibiotics at that point.    Again, thank you very much for coming.  See you in SEPTEMBER.  Do not forget to do FASTING laboratory examinations a few days prior.  Take  care and God bless.  I hope you have a good Lent and a happy Easter!            0  Return in 6 months.  40 minutes please.  Repeat FASTING laboratory examination, then see me for yearly PHYSICAL examination.  Patient no longer seeing GYNECOLOGY.  She will likely need a breast examination.  Reassess mood, energy, function, preventive strategies, cardiovascular risk.            0  Coordinate with orthopedics, urology, as patient is seen.  Dr. Hi Martinez, Dr. D. Amico.            0

## 2025-06-22 DIAGNOSIS — Z91.89 FRAMINGHAM CARDIAC RISK >20% IN NEXT 10 YEARS: Chronic | ICD-10-CM

## 2025-06-22 DIAGNOSIS — E78.2 HYPERCHOLESTEROLEMIA WITH HYPERTRIGLYCERIDEMIA: ICD-10-CM

## 2025-06-23 RX ORDER — ROSUVASTATIN CALCIUM 5 MG/1
TABLET, COATED ORAL
Qty: 45 TABLET | Refills: 0 | Status: SHIPPED | OUTPATIENT
Start: 2025-06-23

## 2025-07-31 ENCOUNTER — HOSPITAL ENCOUNTER (OUTPATIENT)
Dept: RADIOLOGY | Facility: HOSPITAL | Age: 80
Discharge: HOME | End: 2025-07-31
Payer: MEDICARE

## 2025-07-31 ENCOUNTER — OFFICE VISIT (OUTPATIENT)
Dept: PRIMARY CARE | Facility: CLINIC | Age: 80
End: 2025-07-31
Payer: MEDICARE

## 2025-07-31 VITALS
DIASTOLIC BLOOD PRESSURE: 60 MMHG | HEIGHT: 63 IN | OXYGEN SATURATION: 94 % | WEIGHT: 185.5 LBS | BODY MASS INDEX: 32.87 KG/M2 | SYSTOLIC BLOOD PRESSURE: 110 MMHG | HEART RATE: 78 BPM

## 2025-07-31 DIAGNOSIS — M54.12 CERVICAL RADICULOPATHY: ICD-10-CM

## 2025-07-31 DIAGNOSIS — M79.641 RIGHT HAND PAIN: ICD-10-CM

## 2025-07-31 DIAGNOSIS — E78.2 HYPERCHOLESTEROLEMIA WITH HYPERTRIGLYCERIDEMIA: ICD-10-CM

## 2025-07-31 DIAGNOSIS — M25.50 POLYARTHRALGIA: ICD-10-CM

## 2025-07-31 DIAGNOSIS — R20.2 PARESTHESIAS: ICD-10-CM

## 2025-07-31 DIAGNOSIS — G50.0 TRIGEMINAL NEURALGIA: ICD-10-CM

## 2025-07-31 DIAGNOSIS — M43.6 TORTICOLLIS: ICD-10-CM

## 2025-07-31 DIAGNOSIS — E53.8 VITAMIN B12 DEFICIENCY: ICD-10-CM

## 2025-07-31 DIAGNOSIS — M25.50 POLYARTHRALGIA: Primary | ICD-10-CM

## 2025-07-31 PROCEDURE — 1036F TOBACCO NON-USER: CPT | Performed by: INTERNAL MEDICINE

## 2025-07-31 PROCEDURE — G2211 COMPLEX E/M VISIT ADD ON: HCPCS | Performed by: INTERNAL MEDICINE

## 2025-07-31 PROCEDURE — 1159F MED LIST DOCD IN RCRD: CPT | Performed by: INTERNAL MEDICINE

## 2025-07-31 PROCEDURE — 1160F RVW MEDS BY RX/DR IN RCRD: CPT | Performed by: INTERNAL MEDICINE

## 2025-07-31 PROCEDURE — 3078F DIAST BP <80 MM HG: CPT | Performed by: INTERNAL MEDICINE

## 2025-07-31 PROCEDURE — 3074F SYST BP LT 130 MM HG: CPT | Performed by: INTERNAL MEDICINE

## 2025-07-31 PROCEDURE — 73130 X-RAY EXAM OF HAND: CPT | Mod: RT

## 2025-07-31 PROCEDURE — 99213 OFFICE O/P EST LOW 20 MIN: CPT | Performed by: INTERNAL MEDICINE

## 2025-07-31 PROCEDURE — 73130 X-RAY EXAM OF HAND: CPT | Mod: RIGHT SIDE | Performed by: RADIOLOGY

## 2025-07-31 RX ORDER — TIZANIDINE 2 MG/1
TABLET ORAL
Qty: 30 TABLET | Refills: 1 | Status: SHIPPED | OUTPATIENT
Start: 2025-07-31

## 2025-07-31 ASSESSMENT — ENCOUNTER SYMPTOMS
OCCASIONAL FEELINGS OF UNSTEADINESS: 0
DEPRESSION: 0
LOSS OF SENSATION IN FEET: 0

## 2025-07-31 ASSESSMENT — COLUMBIA-SUICIDE SEVERITY RATING SCALE - C-SSRS
1. IN THE PAST MONTH, HAVE YOU WISHED YOU WERE DEAD OR WISHED YOU COULD GO TO SLEEP AND NOT WAKE UP?: NO
6. HAVE YOU EVER DONE ANYTHING, STARTED TO DO ANYTHING, OR PREPARED TO DO ANYTHING TO END YOUR LIFE?: NO
2. HAVE YOU ACTUALLY HAD ANY THOUGHTS OF KILLING YOURSELF?: NO

## 2025-07-31 ASSESSMENT — LIFESTYLE VARIABLES
AUDIT-C TOTAL SCORE: 1
HOW MANY STANDARD DRINKS CONTAINING ALCOHOL DO YOU HAVE ON A TYPICAL DAY: 1 OR 2
HOW OFTEN DO YOU HAVE SIX OR MORE DRINKS ON ONE OCCASION: NEVER
HOW OFTEN DO YOU HAVE A DRINK CONTAINING ALCOHOL: MONTHLY OR LESS
SKIP TO QUESTIONS 9-10: 1

## 2025-07-31 ASSESSMENT — PATIENT HEALTH QUESTIONNAIRE - PHQ9
1. LITTLE INTEREST OR PLEASURE IN DOING THINGS: NOT AT ALL
2. FEELING DOWN, DEPRESSED OR HOPELESS: NOT AT ALL
SUM OF ALL RESPONSES TO PHQ9 QUESTIONS 1 AND 2: 0

## 2025-07-31 NOTE — PROGRESS NOTES
"Subjective   Patient ID: Elvira Dunne is a 80 y.o. female who presents for Follow-up (Middle finger right hand).    HPI   The patient states that for a couple of months, she has been having trouble with STIFFNESS affecting her middle finger, perhaps at the knuckle joint.  She also has had some stiffness affecting the distal joint of her thumb, as well as tingling affecting the fingertips of the thumb and index finger.  Otherwise, no focal weakness.  No history of trauma.  She states that the pain is \"not carpal tunnel syndrome,\" because it is different from the discomfort that she felt with her left wrist and hand.    No headache, blurred vision, no diplopia.  No dysphagia.  She does also point out that she has been having some stiffness and paresthesia affecting the back of her neck and base of her head.  She has had a history of TRIGEMINAL NEURALGIA, and does not want to try GABAPENTIN again.    She is being followed by OPHTHALMOLOGY, and was told that if she is to use STEROIDS, she will need to let them know so that they can change her treatment regimens for GLAUCOMA.  No double vision.        Review of Systems  Review of systems as in history of present illness, and otherwise, reviewed separately as well, and was unremarkable/negative/noncontributory.        Objective   /60 (BP Location: Right arm, Patient Position: Sitting)   Pulse 78   Ht 1.6 m (5' 3\")   Wt 84.1 kg (185 lb 8 oz)   SpO2 94%   BMI 32.86 kg/m²     Physical Exam  Not in distress or diaphoresis.  Alert, oriented x 3.  Amiable.  Not unkempt.  Does want to maintain and hopefully improve quality of life.  Does not wish harm to self or others.  Some modest weight loss noted.    HEAD pink palpebral conjunctivae, anicteric sclerae.  NECK supple, no apparent jugular venous distention.  CARDIOVASCULAR not in distress or diaphoresis.  No bipedal edema.  LUNGS not in distress or diaphoresis.  Not using accessory muscles.  ABDOMEN soft, " nontender.  BACK no costovertebral angle tenderness.  EXTREMITIES no clubbing, no cyanosis.  Phalen and Tinel tests negative.  NEURO no facial asymmetry.  No apparent cranial nerve deficits.  Romberg negative.  Ambulating without need of assistance.  No apparent focal weakness.  No tremors.  PSYCH receptive, appropriate, and eager to maintain and improve quality of life.        LABORATORY results reviewed, with March LDL cholesterol 83, preserved liver function.  Hemoglobin A1c 6.0.  Thyroid function adequate.  Not likely to be contributing to her current symptoms.  In November, vitamin D 64, CPK negative.  In August of last year, uric acid negative, B12 and folic acid adequate.        Assessment/Plan   Diagnoses and all orders for this visit:  Polyarthralgia  -     Comprehensive Metabolic Panel  -     Creatine Kinase  -     Uric Acid  -     XR hand right 3+ views; Future  -     tiZANidine (Zanaflex) 2 mg tablet; Please take 1 tablet by mouth every 6-8 hours as needed for musculoskeletal pain.  Watch out for sleepiness.  No driving if drowsy.  Do not take with alcohol.  Thank you.  Paresthesias  -     Comprehensive Metabolic Panel  -     Creatine Kinase  -     Uric Acid  -     Vitamin B12  -     Folate  -     XR hand right 3+ views; Future  Vitamin B12 deficiency  -     Vitamin B12  -     Folate  Hypercholesterolemia with hypertriglyceridemia  -     Comprehensive Metabolic Panel  Right hand pain  -     XR hand right 3+ views; Future  -     tiZANidine (Zanaflex) 2 mg tablet; Please take 1 tablet by mouth every 6-8 hours as needed for musculoskeletal pain.  Watch out for sleepiness.  No driving if drowsy.  Do not take with alcohol.  Thank you.  Torticollis  -     tiZANidine (Zanaflex) 2 mg tablet; Please take 1 tablet by mouth every 6-8 hours as needed for musculoskeletal pain.  Watch out for sleepiness.  No driving if drowsy.  Do not take with alcohol.  Thank you.  Trigeminal neuralgia  Cervical radiculopathy        Thank you very much for coming.  I am very happy to see you today.    Your symptoms still might be carpal tunnel syndrome.  We will order an x-ray of your WRIST together with the fingers of your right hand, today.  Could you please get this done at [facility] in [place]?  I will send word regarding results and possible changes.    You will also benefit from specific BLOOD examinations that we will evaluate bones, nerves, muscle markers.  Again, let me send word regarding results and possible changes.    In the meantime, for the STIFFNESS, you can try some TIZANIDINE muscle relaxant.  Take 2 mg tablet by mouth every 6 hours as needed for musculoskeletal pain.  Watch out for sleepiness.  No driving if drowsy.  Do not take with alcohol.    You can also take over-the-counter TYLENOL EXTRA STRENGTH 500 mg.  Please take 2 tablets by mouth every 6 hours as needed for pain, up to 6 tablets in 24 hours.  Tylenol is safe to take with or without food, and can be safely taken with your other medications.    Please get yourself some over-the-counter VOLTAREN gel.  You can use this on any joint pain that you have, including stiffness of your middle finger or your thumb.    Please make sure that you have enough SUPPORT from your pillow.  Your neck should have support as well!    Please keep your appointment in September.  Do not forget to do FASTING laboratory examinations a few days prior.  You can have your laboratory examinations done at the [facility] in [place] or any other  facility that is convenient to you.  Just remember to dial [phone], and schedule yourself for an appointment to get your laboratory examinations done.  Please have these done a few days before your next appointment.  The orders are already in the system.    Again, thank you very much for coming.  Let me get back to you with the blood examination and x-ray results.  Take care and God bless.            0

## 2025-07-31 NOTE — PATIENT INSTRUCTIONS
Thank you very much for coming.  I am very happy to see you today.    Your symptoms still might be carpal tunnel syndrome.  We will order an x-ray of your WRIST together with the fingers of your right hand, today.  Could you please get this done at [facility]/ in [place]?  I will send word regarding results and possible changes.    You will also benefit from specific BLOOD examinations that we will evaluate bones, nerves, muscle markers.  Again, let me send word regarding results and possible changes.    In the meantime, for the STIFFNESS, you can try some TIZANIDINE muscle relaxant.  Take 2 mg tablet by mouth every 6 hours as needed for musculoskeletal pain.  Watch out for sleepiness.  No driving if drowsy.  Do not take with alcohol.    You can also take over-the-counter TYLENOL EXTRA STRENGTH 500 mg.  Please take 2 tablets by mouth every 6 hours as needed for pain, up to 6 tablets in 24 hours.  Tylenol is safe to take with or without food, and can be safely taken with your other medications.    Please get yourself some over-the-counter VOLTAREN gel.  You can use this on any joint pain that you have, including stiffness of your middle finger or your thumb.    Please make sure that you have enough SUPPORT from your pillow.  Your neck should have support as well!    Please keep your appointment in September.  Do not forget to do FASTING laboratory examinations a few days prior.  You can have your laboratory examinations done at the [facility] in [place] or any other  facility that is convenient to you.  Just remember to dial [phone], and schedule yourself for an appointment to get your laboratory examinations done.  Please have these done a few days before your next appointment.  The orders are already in the system.    Again, thank you very much for coming.  Let me get back to you with the blood examination and x-ray results.  Take care and God bless.            0

## 2025-08-01 LAB
ALBUMIN SERPL-MCNC: 4.3 G/DL (ref 3.6–5.1)
ALP SERPL-CCNC: 120 U/L (ref 37–153)
ALT SERPL-CCNC: 15 U/L (ref 6–29)
ANION GAP SERPL CALCULATED.4IONS-SCNC: 10 MMOL/L (CALC) (ref 7–17)
AST SERPL-CCNC: 17 U/L (ref 10–35)
BILIRUB SERPL-MCNC: 0.5 MG/DL (ref 0.2–1.2)
BUN SERPL-MCNC: 14 MG/DL (ref 7–25)
CALCIUM SERPL-MCNC: 9.6 MG/DL (ref 8.6–10.4)
CHLORIDE SERPL-SCNC: 106 MMOL/L (ref 98–110)
CK SERPL-CCNC: 35 U/L (ref 18–225)
CO2 SERPL-SCNC: 25 MMOL/L (ref 20–32)
CREAT SERPL-MCNC: 0.66 MG/DL (ref 0.6–1)
EGFRCR SERPLBLD CKD-EPI 2021: 89 ML/MIN/1.73M2
FOLATE SERPL-MCNC: >24 NG/ML
GLUCOSE SERPL-MCNC: 111 MG/DL (ref 65–99)
POTASSIUM SERPL-SCNC: 4.2 MMOL/L (ref 3.5–5.3)
PROT SERPL-MCNC: 6.4 G/DL (ref 6.1–8.1)
SODIUM SERPL-SCNC: 141 MMOL/L (ref 135–146)
URATE SERPL-MCNC: 4.9 MG/DL (ref 2.5–7)
VIT B12 SERPL-MCNC: 1196 PG/ML (ref 200–1100)

## 2025-08-15 ENCOUNTER — TELEPHONE (OUTPATIENT)
Dept: PRIMARY CARE | Facility: CLINIC | Age: 80
End: 2025-08-15
Payer: MEDICARE

## 2025-09-04 ENCOUNTER — APPOINTMENT (OUTPATIENT)
Dept: PRIMARY CARE | Facility: CLINIC | Age: 80
End: 2025-09-04
Payer: MEDICARE

## 2025-09-11 ENCOUNTER — APPOINTMENT (OUTPATIENT)
Dept: PRIMARY CARE | Facility: CLINIC | Age: 80
End: 2025-09-11
Payer: MEDICARE

## (undated) DEVICE — ELASTIC BANDAGE: Brand: DEROYAL

## (undated) DEVICE — STERILE PATIENT PROTECTIVE PAD FOR IMP® KNEE POSITIONERS & COHESIVE WRAP (10 / CASE): Brand: DE MAYO KNEE POSITIONER®

## (undated) DEVICE — BLADE SAW W125XL70MM THK064MM CUT THK094MM REPL RECIP DBL

## (undated) DEVICE — SUTURE VCRL SZ 2-0 L36IN ABSRB UD L36MM CT-1 1/2 CIR J945H

## (undated) DEVICE — T4 HOOD

## (undated) DEVICE — ZIMMER® STERILE DISPOSABLE TOURNIQUET CUFF, DUAL PORT, SINGLE BLADDER, 34 IN. (86 CM)

## (undated) DEVICE — PADDING CAST W6INXL4YD RAYON UNDERCAST SOF-ROL

## (undated) DEVICE — SIPS DUAL 2 MINUTE TIP

## (undated) DEVICE — Z DISCONTINUED PER MEDLINE USE 2741944 DRESSING AQUACEL 12 IN SURG W9XL30CM SIL CVR WTRPRF VIR BACT BARR ANTIMIC

## (undated) DEVICE — GUIDEPIN SURG KT FOR MED SURG LPS-FLEX

## (undated) DEVICE — Device: Brand: STABLECUT®

## (undated) DEVICE — PACK PROCEDURE SURG TOTAL KNEE PACK

## (undated) DEVICE — GLOVE ORANGE PI 7 1/2   MSG9075

## (undated) DEVICE — HYPODERMIC SAFETY NEEDLE: Brand: MAGELLAN

## (undated) DEVICE — GLOVE ORANGE PI 7   MSG9070

## (undated) DEVICE — ELECTRODE PT RET AD L9FT HI MOIST COND ADH HYDRGEL CORDED

## (undated) DEVICE — 35 ML SYRINGE LUER-LOCK TIP: Brand: MONOJECT

## (undated) DEVICE — 3M™ STERI-DRAPE™ U-DRAPE 1015: Brand: STERI-DRAPE™

## (undated) DEVICE — SPLINT KNEE UNIV FOR LESS THAN 36IN L20IN FOAM LAM E CNTCT

## (undated) DEVICE — SUTURE VCRL + SZ 1 L27IN ABSRB UD CT-1 L36MM 1/2 CIR TAPR VCPP40D

## (undated) DEVICE — CHLORAPREP 26ML ORANGE

## (undated) DEVICE — SCREW BNE L25MM DIA2.5MM KNEE FULL THRD HEX FEM PERSONA
Type: IMPLANTABLE DEVICE | Site: KNEE | Status: NON-FUNCTIONAL
Removed: 2019-05-07

## (undated) DEVICE — SUTURE MCRYL SZ 4-0 L27IN ABSRB UD L19MM PS-2 1/2 CIR PRIM Y426H

## (undated) DEVICE — TIBURON EXTREMITY SHEET: Brand: CONVERTORS

## (undated) DEVICE — BLADE RMR L26MM PAT W/ PILOT H